# Patient Record
Sex: FEMALE | Race: WHITE | NOT HISPANIC OR LATINO | Employment: UNEMPLOYED
[De-identification: names, ages, dates, MRNs, and addresses within clinical notes are randomized per-mention and may not be internally consistent; named-entity substitution may affect disease eponyms.]

---

## 2017-05-27 ENCOUNTER — HEALTH MAINTENANCE LETTER (OUTPATIENT)
Age: 47
End: 2017-05-27

## 2017-06-15 ENCOUNTER — OFFICE VISIT - HEALTHEAST (OUTPATIENT)
Dept: ADDICTION MEDICINE | Facility: CLINIC | Age: 47
End: 2017-06-15

## 2017-06-15 DIAGNOSIS — F15.20 AMPHETAMINE USE DISORDER, SEVERE (H): ICD-10-CM

## 2017-06-26 ENCOUNTER — COMMUNICATION - HEALTHEAST (OUTPATIENT)
Dept: ADDICTION MEDICINE | Facility: CLINIC | Age: 47
End: 2017-06-26

## 2017-07-24 ENCOUNTER — OFFICE VISIT - HEALTHEAST (OUTPATIENT)
Dept: ADDICTION MEDICINE | Facility: CLINIC | Age: 47
End: 2017-07-24

## 2017-07-24 DIAGNOSIS — F15.20 AMPHETAMINE USE DISORDER, SEVERE (H): ICD-10-CM

## 2017-07-25 ENCOUNTER — OFFICE VISIT - HEALTHEAST (OUTPATIENT)
Dept: ADDICTION MEDICINE | Facility: CLINIC | Age: 47
End: 2017-07-25

## 2017-07-25 DIAGNOSIS — F15.20 AMPHETAMINE USE DISORDER, SEVERE (H): ICD-10-CM

## 2017-07-28 ENCOUNTER — OFFICE VISIT - HEALTHEAST (OUTPATIENT)
Dept: ADDICTION MEDICINE | Facility: CLINIC | Age: 47
End: 2017-07-28

## 2017-07-28 DIAGNOSIS — F15.20 AMPHETAMINE USE DISORDER, SEVERE (H): ICD-10-CM

## 2017-07-30 ENCOUNTER — AMBULATORY - HEALTHEAST (OUTPATIENT)
Dept: ADDICTION MEDICINE | Facility: CLINIC | Age: 47
End: 2017-07-30

## 2017-08-02 ENCOUNTER — OFFICE VISIT - HEALTHEAST (OUTPATIENT)
Dept: ADDICTION MEDICINE | Facility: CLINIC | Age: 47
End: 2017-08-02

## 2017-08-02 ENCOUNTER — AMBULATORY - HEALTHEAST (OUTPATIENT)
Dept: ADDICTION MEDICINE | Facility: CLINIC | Age: 47
End: 2017-08-02

## 2017-08-02 ENCOUNTER — COMMUNICATION - HEALTHEAST (OUTPATIENT)
Dept: BEHAVIORAL HEALTH | Facility: CLINIC | Age: 47
End: 2017-08-02

## 2017-08-02 DIAGNOSIS — F15.20 AMPHETAMINE USE DISORDER, SEVERE (H): ICD-10-CM

## 2017-08-06 ENCOUNTER — AMBULATORY - HEALTHEAST (OUTPATIENT)
Dept: ADDICTION MEDICINE | Facility: CLINIC | Age: 47
End: 2017-08-06

## 2017-08-07 ENCOUNTER — OFFICE VISIT - HEALTHEAST (OUTPATIENT)
Dept: ADDICTION MEDICINE | Facility: CLINIC | Age: 47
End: 2017-08-07

## 2017-08-07 ENCOUNTER — COMMUNICATION - HEALTHEAST (OUTPATIENT)
Dept: ADDICTION MEDICINE | Facility: CLINIC | Age: 47
End: 2017-08-07

## 2017-08-07 DIAGNOSIS — F15.20 AMPHETAMINE USE DISORDER, SEVERE (H): ICD-10-CM

## 2017-08-08 ENCOUNTER — OFFICE VISIT - HEALTHEAST (OUTPATIENT)
Dept: ADDICTION MEDICINE | Facility: CLINIC | Age: 47
End: 2017-08-08

## 2017-08-08 DIAGNOSIS — F15.20 AMPHETAMINE USE DISORDER, SEVERE (H): ICD-10-CM

## 2017-08-10 ENCOUNTER — OFFICE VISIT - HEALTHEAST (OUTPATIENT)
Dept: BEHAVIORAL HEALTH | Facility: CLINIC | Age: 47
End: 2017-08-10

## 2017-08-10 DIAGNOSIS — F41.1 ANXIETY STATE: ICD-10-CM

## 2017-08-10 DIAGNOSIS — F39 UNSPECIFIED MOOD (AFFECTIVE) DISORDER (H): ICD-10-CM

## 2017-08-10 DIAGNOSIS — F15.21 METHAMPHETAMINE USE DISORDER, SEVERE, IN EARLY REMISSION (H): ICD-10-CM

## 2017-08-13 ENCOUNTER — AMBULATORY - HEALTHEAST (OUTPATIENT)
Dept: ADDICTION MEDICINE | Facility: CLINIC | Age: 47
End: 2017-08-13

## 2017-08-14 ENCOUNTER — OFFICE VISIT - HEALTHEAST (OUTPATIENT)
Dept: BEHAVIORAL HEALTH | Facility: CLINIC | Age: 47
End: 2017-08-14

## 2017-08-14 ENCOUNTER — OFFICE VISIT - HEALTHEAST (OUTPATIENT)
Dept: ADDICTION MEDICINE | Facility: CLINIC | Age: 47
End: 2017-08-14

## 2017-08-14 DIAGNOSIS — F31.9 BIPOLAR DISORDER, UNSPECIFIED (H): ICD-10-CM

## 2017-08-14 DIAGNOSIS — F15.20 AMPHETAMINE USE DISORDER, SEVERE (H): ICD-10-CM

## 2017-08-14 DIAGNOSIS — F15.20 METHAMPHETAMINE USE DISORDER, SEVERE (H): ICD-10-CM

## 2017-08-18 ENCOUNTER — OFFICE VISIT - HEALTHEAST (OUTPATIENT)
Dept: ADDICTION MEDICINE | Facility: CLINIC | Age: 47
End: 2017-08-18

## 2017-08-18 DIAGNOSIS — F15.20 AMPHETAMINE USE DISORDER, SEVERE (H): ICD-10-CM

## 2017-08-20 ENCOUNTER — AMBULATORY - HEALTHEAST (OUTPATIENT)
Dept: ADDICTION MEDICINE | Facility: CLINIC | Age: 47
End: 2017-08-20

## 2017-08-23 ENCOUNTER — OFFICE VISIT - HEALTHEAST (OUTPATIENT)
Dept: ADDICTION MEDICINE | Facility: CLINIC | Age: 47
End: 2017-08-23

## 2017-08-23 ENCOUNTER — OFFICE VISIT - HEALTHEAST (OUTPATIENT)
Dept: BEHAVIORAL HEALTH | Facility: CLINIC | Age: 47
End: 2017-08-23

## 2017-08-23 DIAGNOSIS — F39 EPISODIC MOOD DISORDER (H): ICD-10-CM

## 2017-08-23 DIAGNOSIS — F15.20 METHAMPHETAMINE ADDICTION (H): ICD-10-CM

## 2017-08-23 DIAGNOSIS — F15.20 AMPHETAMINE USE DISORDER, SEVERE (H): ICD-10-CM

## 2017-08-23 ASSESSMENT — MIFFLIN-ST. JEOR: SCORE: 1505.13

## 2017-08-25 ENCOUNTER — OFFICE VISIT - HEALTHEAST (OUTPATIENT)
Dept: ADDICTION MEDICINE | Facility: CLINIC | Age: 47
End: 2017-08-25

## 2017-08-25 ENCOUNTER — AMBULATORY - HEALTHEAST (OUTPATIENT)
Dept: ADDICTION MEDICINE | Facility: CLINIC | Age: 47
End: 2017-08-25

## 2017-08-25 DIAGNOSIS — F15.20 AMPHETAMINE USE DISORDER, SEVERE (H): ICD-10-CM

## 2017-08-29 ENCOUNTER — COMMUNICATION - HEALTHEAST (OUTPATIENT)
Dept: ADDICTION MEDICINE | Facility: CLINIC | Age: 47
End: 2017-08-29

## 2017-09-01 ENCOUNTER — AMBULATORY - HEALTHEAST (OUTPATIENT)
Dept: ADDICTION MEDICINE | Facility: CLINIC | Age: 47
End: 2017-09-01

## 2017-09-01 ENCOUNTER — COMMUNICATION - HEALTHEAST (OUTPATIENT)
Dept: ADDICTION MEDICINE | Facility: CLINIC | Age: 47
End: 2017-09-01

## 2017-09-05 ENCOUNTER — COMMUNICATION - HEALTHEAST (OUTPATIENT)
Dept: ADDICTION MEDICINE | Facility: HOSPITAL | Age: 47
End: 2017-09-05

## 2017-09-11 ENCOUNTER — AMBULATORY - HEALTHEAST (OUTPATIENT)
Dept: ADDICTION MEDICINE | Facility: HOSPITAL | Age: 47
End: 2017-09-11

## 2017-09-12 ENCOUNTER — AMBULATORY - HEALTHEAST (OUTPATIENT)
Dept: ADDICTION MEDICINE | Facility: HOSPITAL | Age: 47
End: 2017-09-12

## 2017-09-13 ENCOUNTER — COMMUNICATION - HEALTHEAST (OUTPATIENT)
Dept: ADDICTION MEDICINE | Facility: HOSPITAL | Age: 47
End: 2017-09-13

## 2017-09-14 ENCOUNTER — COMMUNICATION - HEALTHEAST (OUTPATIENT)
Dept: ADDICTION MEDICINE | Facility: CLINIC | Age: 47
End: 2017-09-14

## 2017-09-14 ENCOUNTER — AMBULATORY - HEALTHEAST (OUTPATIENT)
Dept: ADDICTION MEDICINE | Facility: CLINIC | Age: 47
End: 2017-09-14

## 2017-11-22 ENCOUNTER — COMMUNICATION - HEALTHEAST (OUTPATIENT)
Dept: BEHAVIORAL HEALTH | Facility: CLINIC | Age: 47
End: 2017-11-22

## 2018-01-15 ENCOUNTER — COMMUNICATION - HEALTHEAST (OUTPATIENT)
Dept: BEHAVIORAL HEALTH | Facility: CLINIC | Age: 48
End: 2018-01-15

## 2018-04-23 ENCOUNTER — MEDICAL CORRESPONDENCE (OUTPATIENT)
Dept: HEALTH INFORMATION MANAGEMENT | Facility: CLINIC | Age: 48
End: 2018-04-23

## 2018-10-02 LAB
CHOLEST SERPL-MCNC: 230 MG/DL (ref 0–199)
HDLC SERPL-MCNC: 63 MG/DL (ref 40–59)
LDLC SERPL CALC-MCNC: 145 MG/DL
PAP-ABSTRACT: NORMAL
TRIGL SERPL-MCNC: 108 MG/DL

## 2018-10-11 ENCOUNTER — TRANSFERRED RECORDS (OUTPATIENT)
Dept: HEALTH INFORMATION MANAGEMENT | Facility: CLINIC | Age: 48
End: 2018-10-11

## 2019-11-12 ENCOUNTER — COMMUNICATION - HEALTHEAST (OUTPATIENT)
Dept: ADDICTION MEDICINE | Facility: HOSPITAL | Age: 49
End: 2019-11-12

## 2019-11-12 ENCOUNTER — OFFICE VISIT - HEALTHEAST (OUTPATIENT)
Dept: ADDICTION MEDICINE | Facility: HOSPITAL | Age: 49
End: 2019-11-12

## 2019-11-12 DIAGNOSIS — F15.20 METHAMPHETAMINE USE DISORDER, SEVERE (H): ICD-10-CM

## 2019-11-25 ENCOUNTER — OFFICE VISIT - HEALTHEAST (OUTPATIENT)
Dept: ADDICTION MEDICINE | Facility: HOSPITAL | Age: 49
End: 2019-11-25

## 2019-11-25 ENCOUNTER — AMBULATORY - HEALTHEAST (OUTPATIENT)
Dept: ADDICTION MEDICINE | Facility: HOSPITAL | Age: 49
End: 2019-11-25

## 2019-11-25 DIAGNOSIS — F15.11 OTHER STIMULANT ABUSE, IN REMISSION (H): ICD-10-CM

## 2019-11-25 DIAGNOSIS — F15.20 METHAMPHETAMINE USE DISORDER, SEVERE (H): ICD-10-CM

## 2019-11-27 ENCOUNTER — TRANSFERRED RECORDS (OUTPATIENT)
Dept: HEALTH INFORMATION MANAGEMENT | Facility: CLINIC | Age: 49
End: 2019-11-27

## 2019-11-27 ENCOUNTER — OFFICE VISIT - HEALTHEAST (OUTPATIENT)
Dept: ADDICTION MEDICINE | Facility: HOSPITAL | Age: 49
End: 2019-11-27

## 2019-11-27 ENCOUNTER — AMBULATORY - HEALTHEAST (OUTPATIENT)
Dept: ADDICTION MEDICINE | Facility: HOSPITAL | Age: 49
End: 2019-11-27

## 2019-11-27 DIAGNOSIS — F15.20 METHAMPHETAMINE USE DISORDER, SEVERE (H): ICD-10-CM

## 2019-12-02 ENCOUNTER — OFFICE VISIT - HEALTHEAST (OUTPATIENT)
Dept: ADDICTION MEDICINE | Facility: HOSPITAL | Age: 49
End: 2019-12-02

## 2019-12-02 DIAGNOSIS — F15.20 METHAMPHETAMINE USE DISORDER, SEVERE (H): ICD-10-CM

## 2019-12-04 ENCOUNTER — OFFICE VISIT - HEALTHEAST (OUTPATIENT)
Dept: ADDICTION MEDICINE | Facility: HOSPITAL | Age: 49
End: 2019-12-04

## 2019-12-04 DIAGNOSIS — F15.20 METHAMPHETAMINE USE DISORDER, SEVERE (H): ICD-10-CM

## 2019-12-06 ENCOUNTER — AMBULATORY - HEALTHEAST (OUTPATIENT)
Dept: ADDICTION MEDICINE | Facility: HOSPITAL | Age: 49
End: 2019-12-06

## 2019-12-09 ENCOUNTER — OFFICE VISIT - HEALTHEAST (OUTPATIENT)
Dept: ADDICTION MEDICINE | Facility: HOSPITAL | Age: 49
End: 2019-12-09

## 2019-12-09 DIAGNOSIS — F15.20 METHAMPHETAMINE USE DISORDER, SEVERE (H): ICD-10-CM

## 2019-12-11 ENCOUNTER — OFFICE VISIT - HEALTHEAST (OUTPATIENT)
Dept: ADDICTION MEDICINE | Facility: HOSPITAL | Age: 49
End: 2019-12-11

## 2019-12-11 ENCOUNTER — AMBULATORY - HEALTHEAST (OUTPATIENT)
Dept: LAB | Facility: HOSPITAL | Age: 49
End: 2019-12-11

## 2019-12-11 DIAGNOSIS — F15.20 METHAMPHETAMINE USE DISORDER, SEVERE (H): ICD-10-CM

## 2019-12-11 DIAGNOSIS — F15.11 OTHER STIMULANT ABUSE, IN REMISSION (H): ICD-10-CM

## 2019-12-11 LAB
AMPHETAMINES UR QL SCN: ABNORMAL
BARBITURATES UR QL: ABNORMAL
BENZODIAZ UR QL: ABNORMAL
CANNABINOIDS UR QL SCN: ABNORMAL
COCAINE UR QL: ABNORMAL
CREAT UR-MCNC: 21.3 MG/DL
METHADONE UR QL SCN: ABNORMAL
OPIATES UR QL SCN: ABNORMAL
OXYCODONE UR QL: ABNORMAL
PCP UR QL SCN: ABNORMAL

## 2019-12-13 ENCOUNTER — AMBULATORY - HEALTHEAST (OUTPATIENT)
Dept: ADDICTION MEDICINE | Facility: HOSPITAL | Age: 49
End: 2019-12-13

## 2019-12-13 ENCOUNTER — OFFICE VISIT - HEALTHEAST (OUTPATIENT)
Dept: ADDICTION MEDICINE | Facility: HOSPITAL | Age: 49
End: 2019-12-13

## 2019-12-13 ENCOUNTER — AMBULATORY - HEALTHEAST (OUTPATIENT)
Dept: LAB | Facility: HOSPITAL | Age: 49
End: 2019-12-13

## 2019-12-13 DIAGNOSIS — F15.11 OTHER STIMULANT ABUSE, IN REMISSION (H): ICD-10-CM

## 2019-12-13 DIAGNOSIS — F15.20 METHAMPHETAMINE USE DISORDER, SEVERE (H): ICD-10-CM

## 2019-12-13 LAB
AMPHET UR-MCNC: 655 NG/ML
AMPHETAMINES UR QL SCN: ABNORMAL
BARBITURATES UR QL: ABNORMAL
BENZODIAZ UR QL: ABNORMAL
CANNABINOIDS UR QL SCN: ABNORMAL
COCAINE UR QL: ABNORMAL
CREAT UR-MCNC: 369.6 MG/DL
MDA UR-MCNC: <200 NG/ML
MDEA UR-MCNC: <200 NG/ML
MDMA UR-MCNC: <200 NG/ML
METHADONE UR QL SCN: ABNORMAL
METHAMPHET UR-MCNC: 3743 NG/ML
OPIATES UR QL SCN: ABNORMAL
OXYCODONE UR QL: ABNORMAL
PCP UR QL SCN: ABNORMAL
PHENTERMINE UR CFM-MCNC: <200 NG/ML

## 2019-12-14 LAB — BZE UR-MCNC: >1000 NG/ML

## 2019-12-16 ENCOUNTER — OFFICE VISIT - HEALTHEAST (OUTPATIENT)
Dept: ADDICTION MEDICINE | Facility: HOSPITAL | Age: 49
End: 2019-12-16

## 2019-12-16 DIAGNOSIS — F15.20 METHAMPHETAMINE USE DISORDER, SEVERE (H): ICD-10-CM

## 2019-12-16 LAB
AMPHET UR-MCNC: 3485 NG/ML
BZE UR-MCNC: >1000 NG/ML
MDA UR-MCNC: <200 NG/ML
MDEA UR-MCNC: <200 NG/ML
MDMA UR-MCNC: <200 NG/ML
METHAMPHET UR-MCNC: 5336 NG/ML
PHENTERMINE UR CFM-MCNC: <200 NG/ML

## 2019-12-18 ENCOUNTER — COMMUNICATION - HEALTHEAST (OUTPATIENT)
Dept: ADDICTION MEDICINE | Facility: HOSPITAL | Age: 49
End: 2019-12-18

## 2019-12-20 ENCOUNTER — AMBULATORY - HEALTHEAST (OUTPATIENT)
Dept: LAB | Facility: HOSPITAL | Age: 49
End: 2019-12-20

## 2019-12-20 ENCOUNTER — AMBULATORY - HEALTHEAST (OUTPATIENT)
Dept: ADDICTION MEDICINE | Facility: HOSPITAL | Age: 49
End: 2019-12-20

## 2019-12-20 ENCOUNTER — OFFICE VISIT - HEALTHEAST (OUTPATIENT)
Dept: ADDICTION MEDICINE | Facility: HOSPITAL | Age: 49
End: 2019-12-20

## 2019-12-20 DIAGNOSIS — F15.11 OTHER STIMULANT ABUSE, IN REMISSION (H): ICD-10-CM

## 2019-12-20 DIAGNOSIS — F15.20 METHAMPHETAMINE USE DISORDER, SEVERE (H): ICD-10-CM

## 2019-12-20 LAB
AMPHETAMINES UR QL SCN: NORMAL
BARBITURATES UR QL: NORMAL
BENZODIAZ UR QL: NORMAL
CANNABINOIDS UR QL SCN: NORMAL
COCAINE UR QL: NORMAL
CREAT UR-MCNC: 196.3 MG/DL
METHADONE UR QL SCN: NORMAL
OPIATES UR QL SCN: NORMAL
OXYCODONE UR QL: NORMAL
PCP UR QL SCN: NORMAL

## 2019-12-23 ENCOUNTER — OFFICE VISIT - HEALTHEAST (OUTPATIENT)
Dept: ADDICTION MEDICINE | Facility: HOSPITAL | Age: 49
End: 2019-12-23

## 2019-12-23 ENCOUNTER — AMBULATORY - HEALTHEAST (OUTPATIENT)
Dept: LAB | Facility: HOSPITAL | Age: 49
End: 2019-12-23

## 2019-12-23 DIAGNOSIS — F15.20 METHAMPHETAMINE USE DISORDER, SEVERE (H): ICD-10-CM

## 2019-12-23 DIAGNOSIS — F15.11 OTHER STIMULANT ABUSE, IN REMISSION (H): ICD-10-CM

## 2019-12-23 LAB
AMPHETAMINES UR QL SCN: NORMAL
BARBITURATES UR QL: NORMAL
BENZODIAZ UR QL: NORMAL
CANNABINOIDS UR QL SCN: NORMAL
COCAINE UR QL: NORMAL
CREAT UR-MCNC: 25.1 MG/DL
METHADONE UR QL SCN: NORMAL
OPIATES UR QL SCN: NORMAL
OXYCODONE UR QL: NORMAL
PCP UR QL SCN: NORMAL

## 2019-12-26 ENCOUNTER — AMBULATORY - HEALTHEAST (OUTPATIENT)
Dept: ADDICTION MEDICINE | Facility: HOSPITAL | Age: 49
End: 2019-12-26

## 2019-12-27 ENCOUNTER — OFFICE VISIT - HEALTHEAST (OUTPATIENT)
Dept: ADDICTION MEDICINE | Facility: HOSPITAL | Age: 49
End: 2019-12-27

## 2019-12-27 DIAGNOSIS — F15.20 METHAMPHETAMINE USE DISORDER, SEVERE (H): ICD-10-CM

## 2019-12-30 ENCOUNTER — OFFICE VISIT - HEALTHEAST (OUTPATIENT)
Dept: ADDICTION MEDICINE | Facility: HOSPITAL | Age: 49
End: 2019-12-30

## 2019-12-30 ENCOUNTER — AMBULATORY - HEALTHEAST (OUTPATIENT)
Dept: LAB | Facility: HOSPITAL | Age: 49
End: 2019-12-30

## 2019-12-30 DIAGNOSIS — F15.20 METHAMPHETAMINE USE DISORDER, SEVERE (H): ICD-10-CM

## 2019-12-30 DIAGNOSIS — F15.11 OTHER STIMULANT ABUSE, IN REMISSION (H): ICD-10-CM

## 2019-12-30 LAB
AMPHETAMINES UR QL SCN: NORMAL
BARBITURATES UR QL: NORMAL
BENZODIAZ UR QL: NORMAL
CANNABINOIDS UR QL SCN: NORMAL
COCAINE UR QL: NORMAL
CREAT UR-MCNC: 19.5 MG/DL
METHADONE UR QL SCN: NORMAL
OPIATES UR QL SCN: NORMAL
OXYCODONE UR QL: NORMAL
PCP UR QL SCN: NORMAL

## 2020-01-03 ENCOUNTER — OFFICE VISIT - HEALTHEAST (OUTPATIENT)
Dept: ADDICTION MEDICINE | Facility: HOSPITAL | Age: 50
End: 2020-01-03

## 2020-01-03 ENCOUNTER — AMBULATORY - HEALTHEAST (OUTPATIENT)
Dept: LAB | Facility: HOSPITAL | Age: 50
End: 2020-01-03

## 2020-01-03 ENCOUNTER — AMBULATORY - HEALTHEAST (OUTPATIENT)
Dept: ADDICTION MEDICINE | Facility: HOSPITAL | Age: 50
End: 2020-01-03

## 2020-01-03 DIAGNOSIS — F15.20 METHAMPHETAMINE USE DISORDER, SEVERE (H): ICD-10-CM

## 2020-01-03 DIAGNOSIS — F15.11 OTHER STIMULANT ABUSE, IN REMISSION (H): ICD-10-CM

## 2020-01-03 LAB
AMPHETAMINES UR QL SCN: NORMAL
BARBITURATES UR QL: NORMAL
BENZODIAZ UR QL: NORMAL
CANNABINOIDS UR QL SCN: NORMAL
COCAINE UR QL: NORMAL
CREAT UR-MCNC: 39.4 MG/DL
METHADONE UR QL SCN: NORMAL
OPIATES UR QL SCN: NORMAL
OXYCODONE UR QL: NORMAL
PCP UR QL SCN: NORMAL

## 2020-01-06 ENCOUNTER — OFFICE VISIT - HEALTHEAST (OUTPATIENT)
Dept: ADDICTION MEDICINE | Facility: HOSPITAL | Age: 50
End: 2020-01-06

## 2020-01-06 DIAGNOSIS — F15.20 METHAMPHETAMINE USE DISORDER, SEVERE (H): ICD-10-CM

## 2020-01-08 ENCOUNTER — AMBULATORY - HEALTHEAST (OUTPATIENT)
Dept: LAB | Facility: HOSPITAL | Age: 50
End: 2020-01-08

## 2020-01-08 ENCOUNTER — OFFICE VISIT - HEALTHEAST (OUTPATIENT)
Dept: ADDICTION MEDICINE | Facility: HOSPITAL | Age: 50
End: 2020-01-08

## 2020-01-08 DIAGNOSIS — F15.20 METHAMPHETAMINE USE DISORDER, SEVERE (H): ICD-10-CM

## 2020-01-08 DIAGNOSIS — F15.11 OTHER STIMULANT ABUSE, IN REMISSION (H): ICD-10-CM

## 2020-01-08 LAB
AMPHETAMINES UR QL SCN: NORMAL
BARBITURATES UR QL: NORMAL
BENZODIAZ UR QL: NORMAL
CANNABINOIDS UR QL SCN: NORMAL
COCAINE UR QL: NORMAL
CREAT UR-MCNC: 18.6 MG/DL
METHADONE UR QL SCN: NORMAL
OPIATES UR QL SCN: NORMAL
OXYCODONE UR QL: NORMAL
PCP UR QL SCN: NORMAL

## 2020-01-10 ENCOUNTER — AMBULATORY - HEALTHEAST (OUTPATIENT)
Dept: ADDICTION MEDICINE | Facility: HOSPITAL | Age: 50
End: 2020-01-10

## 2020-01-10 ENCOUNTER — OFFICE VISIT - HEALTHEAST (OUTPATIENT)
Dept: ADDICTION MEDICINE | Facility: HOSPITAL | Age: 50
End: 2020-01-10

## 2020-01-10 DIAGNOSIS — F15.20 METHAMPHETAMINE USE DISORDER, SEVERE (H): ICD-10-CM

## 2020-01-13 ENCOUNTER — OFFICE VISIT - HEALTHEAST (OUTPATIENT)
Dept: ADDICTION MEDICINE | Facility: HOSPITAL | Age: 50
End: 2020-01-13

## 2020-01-13 DIAGNOSIS — F15.20 METHAMPHETAMINE USE DISORDER, SEVERE (H): ICD-10-CM

## 2020-01-15 ENCOUNTER — OFFICE VISIT - HEALTHEAST (OUTPATIENT)
Dept: ADDICTION MEDICINE | Facility: HOSPITAL | Age: 50
End: 2020-01-15

## 2020-01-15 DIAGNOSIS — F15.20 METHAMPHETAMINE USE DISORDER, SEVERE (H): ICD-10-CM

## 2020-01-17 ENCOUNTER — OFFICE VISIT - HEALTHEAST (OUTPATIENT)
Dept: ADDICTION MEDICINE | Facility: HOSPITAL | Age: 50
End: 2020-01-17

## 2020-01-17 ENCOUNTER — AMBULATORY - HEALTHEAST (OUTPATIENT)
Dept: ADDICTION MEDICINE | Facility: HOSPITAL | Age: 50
End: 2020-01-17

## 2020-01-17 DIAGNOSIS — F15.20 METHAMPHETAMINE USE DISORDER, SEVERE (H): ICD-10-CM

## 2020-01-20 ENCOUNTER — AMBULATORY - HEALTHEAST (OUTPATIENT)
Dept: ADDICTION MEDICINE | Facility: HOSPITAL | Age: 50
End: 2020-01-20

## 2020-01-20 ENCOUNTER — OFFICE VISIT - HEALTHEAST (OUTPATIENT)
Dept: ADDICTION MEDICINE | Facility: HOSPITAL | Age: 50
End: 2020-01-20

## 2020-01-20 ENCOUNTER — AMBULATORY - HEALTHEAST (OUTPATIENT)
Dept: LAB | Facility: HOSPITAL | Age: 50
End: 2020-01-20

## 2020-01-20 ENCOUNTER — TRANSFERRED RECORDS (OUTPATIENT)
Dept: HEALTH INFORMATION MANAGEMENT | Facility: CLINIC | Age: 50
End: 2020-01-20

## 2020-01-20 DIAGNOSIS — F15.20 METHAMPHETAMINE USE DISORDER, SEVERE (H): ICD-10-CM

## 2020-01-20 DIAGNOSIS — F15.11 OTHER STIMULANT ABUSE, IN REMISSION (H): ICD-10-CM

## 2020-01-20 LAB
AMPHETAMINES UR QL SCN: NORMAL
BARBITURATES UR QL: NORMAL
BENZODIAZ UR QL: NORMAL
CANNABINOIDS UR QL SCN: NORMAL
COCAINE UR QL: NORMAL
CREAT UR-MCNC: 20.1 MG/DL
METHADONE UR QL SCN: NORMAL
OPIATES UR QL SCN: NORMAL
OXYCODONE UR QL: NORMAL
PCP UR QL SCN: NORMAL

## 2020-01-22 ENCOUNTER — OFFICE VISIT - HEALTHEAST (OUTPATIENT)
Dept: ADDICTION MEDICINE | Facility: HOSPITAL | Age: 50
End: 2020-01-22

## 2020-01-22 DIAGNOSIS — F15.20 METHAMPHETAMINE USE DISORDER, SEVERE (H): ICD-10-CM

## 2020-01-24 ENCOUNTER — AMBULATORY - HEALTHEAST (OUTPATIENT)
Dept: ADDICTION MEDICINE | Facility: HOSPITAL | Age: 50
End: 2020-01-24

## 2020-01-24 ENCOUNTER — OFFICE VISIT - HEALTHEAST (OUTPATIENT)
Dept: ADDICTION MEDICINE | Facility: HOSPITAL | Age: 50
End: 2020-01-24

## 2020-01-24 ENCOUNTER — AMBULATORY - HEALTHEAST (OUTPATIENT)
Dept: LAB | Facility: HOSPITAL | Age: 50
End: 2020-01-24

## 2020-01-24 DIAGNOSIS — F15.20 METHAMPHETAMINE USE DISORDER, SEVERE (H): ICD-10-CM

## 2020-01-24 DIAGNOSIS — F15.11 OTHER STIMULANT ABUSE, IN REMISSION (H): ICD-10-CM

## 2020-01-24 LAB
AMPHETAMINES UR QL SCN: NORMAL
BARBITURATES UR QL: NORMAL
BENZODIAZ UR QL: NORMAL
CANNABINOIDS UR QL SCN: NORMAL
COCAINE UR QL: NORMAL
CREAT UR-MCNC: 87.5 MG/DL
METHADONE UR QL SCN: NORMAL
OPIATES UR QL SCN: NORMAL
OXYCODONE UR QL: NORMAL
PCP UR QL SCN: NORMAL

## 2020-01-27 ENCOUNTER — AMBULATORY - HEALTHEAST (OUTPATIENT)
Dept: ADDICTION MEDICINE | Facility: HOSPITAL | Age: 50
End: 2020-01-27

## 2020-01-27 ENCOUNTER — OFFICE VISIT - HEALTHEAST (OUTPATIENT)
Dept: ADDICTION MEDICINE | Facility: HOSPITAL | Age: 50
End: 2020-01-27

## 2020-01-27 DIAGNOSIS — F15.20 METHAMPHETAMINE USE DISORDER, SEVERE (H): ICD-10-CM

## 2020-01-31 ENCOUNTER — OFFICE VISIT - HEALTHEAST (OUTPATIENT)
Dept: ADDICTION MEDICINE | Facility: HOSPITAL | Age: 50
End: 2020-01-31

## 2020-01-31 ENCOUNTER — AMBULATORY - HEALTHEAST (OUTPATIENT)
Dept: ADDICTION MEDICINE | Facility: HOSPITAL | Age: 50
End: 2020-01-31

## 2020-01-31 DIAGNOSIS — F15.20 METHAMPHETAMINE USE DISORDER, SEVERE (H): ICD-10-CM

## 2020-02-03 ENCOUNTER — OFFICE VISIT - HEALTHEAST (OUTPATIENT)
Dept: ADDICTION MEDICINE | Facility: HOSPITAL | Age: 50
End: 2020-02-03

## 2020-02-03 ENCOUNTER — AMBULATORY - HEALTHEAST (OUTPATIENT)
Dept: LAB | Facility: HOSPITAL | Age: 50
End: 2020-02-03

## 2020-02-03 DIAGNOSIS — F15.20 METHAMPHETAMINE USE DISORDER, SEVERE (H): ICD-10-CM

## 2020-02-03 DIAGNOSIS — F15.11 OTHER STIMULANT ABUSE, IN REMISSION (H): ICD-10-CM

## 2020-02-03 LAB
AMPHETAMINES UR QL SCN: NORMAL
BARBITURATES UR QL: NORMAL
BENZODIAZ UR QL: NORMAL
CANNABINOIDS UR QL SCN: NORMAL
COCAINE UR QL: NORMAL
CREAT UR-MCNC: 22.9 MG/DL
METHADONE UR QL SCN: NORMAL
OPIATES UR QL SCN: NORMAL
OXYCODONE UR QL: NORMAL
PCP UR QL SCN: NORMAL

## 2020-02-07 ENCOUNTER — OFFICE VISIT - HEALTHEAST (OUTPATIENT)
Dept: ADDICTION MEDICINE | Facility: HOSPITAL | Age: 50
End: 2020-02-07

## 2020-02-07 ENCOUNTER — AMBULATORY - HEALTHEAST (OUTPATIENT)
Dept: ADDICTION MEDICINE | Facility: HOSPITAL | Age: 50
End: 2020-02-07

## 2020-02-07 DIAGNOSIS — F15.20 METHAMPHETAMINE USE DISORDER, SEVERE (H): ICD-10-CM

## 2020-02-10 ENCOUNTER — AMBULATORY - HEALTHEAST (OUTPATIENT)
Dept: LAB | Facility: HOSPITAL | Age: 50
End: 2020-02-10

## 2020-02-10 ENCOUNTER — OFFICE VISIT - HEALTHEAST (OUTPATIENT)
Dept: ADDICTION MEDICINE | Facility: HOSPITAL | Age: 50
End: 2020-02-10

## 2020-02-10 DIAGNOSIS — F15.20 METHAMPHETAMINE USE DISORDER, SEVERE (H): ICD-10-CM

## 2020-02-10 DIAGNOSIS — F15.11 OTHER STIMULANT ABUSE, IN REMISSION (H): ICD-10-CM

## 2020-02-10 LAB
AMPHETAMINES UR QL SCN: NORMAL
BARBITURATES UR QL: NORMAL
BENZODIAZ UR QL: NORMAL
CANNABINOIDS UR QL SCN: NORMAL
COCAINE UR QL: NORMAL
CREAT UR-MCNC: 66.6 MG/DL
METHADONE UR QL SCN: NORMAL
OPIATES UR QL SCN: NORMAL
OXYCODONE UR QL: NORMAL
PCP UR QL SCN: NORMAL

## 2020-02-14 ENCOUNTER — OFFICE VISIT - HEALTHEAST (OUTPATIENT)
Dept: ADDICTION MEDICINE | Facility: HOSPITAL | Age: 50
End: 2020-02-14

## 2020-02-14 ENCOUNTER — AMBULATORY - HEALTHEAST (OUTPATIENT)
Dept: ADDICTION MEDICINE | Facility: HOSPITAL | Age: 50
End: 2020-02-14

## 2020-02-14 DIAGNOSIS — F15.20 METHAMPHETAMINE USE DISORDER, SEVERE (H): ICD-10-CM

## 2020-02-17 ENCOUNTER — OFFICE VISIT - HEALTHEAST (OUTPATIENT)
Dept: ADDICTION MEDICINE | Facility: HOSPITAL | Age: 50
End: 2020-02-17

## 2020-02-17 ENCOUNTER — AMBULATORY - HEALTHEAST (OUTPATIENT)
Dept: LAB | Facility: HOSPITAL | Age: 50
End: 2020-02-17

## 2020-02-17 DIAGNOSIS — F15.11 OTHER STIMULANT ABUSE, IN REMISSION (H): ICD-10-CM

## 2020-02-17 DIAGNOSIS — F15.20 METHAMPHETAMINE USE DISORDER, SEVERE (H): ICD-10-CM

## 2020-02-17 LAB
AMPHETAMINES UR QL SCN: NORMAL
BARBITURATES UR QL: NORMAL
BENZODIAZ UR QL: NORMAL
CANNABINOIDS UR QL SCN: NORMAL
COCAINE UR QL: NORMAL
CREAT UR-MCNC: 389.1 MG/DL
METHADONE UR QL SCN: NORMAL
OPIATES UR QL SCN: NORMAL
OXYCODONE UR QL: NORMAL
PCP UR QL SCN: NORMAL

## 2020-02-21 ENCOUNTER — AMBULATORY - HEALTHEAST (OUTPATIENT)
Dept: ADDICTION MEDICINE | Facility: HOSPITAL | Age: 50
End: 2020-02-21

## 2020-02-21 ENCOUNTER — OFFICE VISIT - HEALTHEAST (OUTPATIENT)
Dept: ADDICTION MEDICINE | Facility: HOSPITAL | Age: 50
End: 2020-02-21

## 2020-02-21 DIAGNOSIS — F15.20 METHAMPHETAMINE USE DISORDER, SEVERE (H): ICD-10-CM

## 2020-02-24 ENCOUNTER — AMBULATORY - HEALTHEAST (OUTPATIENT)
Dept: LAB | Facility: HOSPITAL | Age: 50
End: 2020-02-24

## 2020-02-24 ENCOUNTER — OFFICE VISIT - HEALTHEAST (OUTPATIENT)
Dept: ADDICTION MEDICINE | Facility: HOSPITAL | Age: 50
End: 2020-02-24

## 2020-02-24 DIAGNOSIS — F15.11 OTHER STIMULANT ABUSE, IN REMISSION (H): ICD-10-CM

## 2020-02-24 DIAGNOSIS — F15.20 METHAMPHETAMINE USE DISORDER, SEVERE (H): ICD-10-CM

## 2020-02-24 LAB
AMPHETAMINES UR QL SCN: NORMAL
BARBITURATES UR QL: NORMAL
BENZODIAZ UR QL: NORMAL
CANNABINOIDS UR QL SCN: NORMAL
COCAINE UR QL: NORMAL
CREAT UR-MCNC: 48.2 MG/DL
METHADONE UR QL SCN: NORMAL
OPIATES UR QL SCN: NORMAL
OXYCODONE UR QL: NORMAL
PCP UR QL SCN: NORMAL

## 2020-02-27 ENCOUNTER — TRANSFERRED RECORDS (OUTPATIENT)
Dept: HEALTH INFORMATION MANAGEMENT | Facility: CLINIC | Age: 50
End: 2020-02-27

## 2020-02-27 ENCOUNTER — AMBULATORY - HEALTHEAST (OUTPATIENT)
Dept: ADDICTION MEDICINE | Facility: HOSPITAL | Age: 50
End: 2020-02-27

## 2020-02-28 ENCOUNTER — OFFICE VISIT - HEALTHEAST (OUTPATIENT)
Dept: ADDICTION MEDICINE | Facility: HOSPITAL | Age: 50
End: 2020-02-28

## 2020-02-28 ENCOUNTER — AMBULATORY - HEALTHEAST (OUTPATIENT)
Dept: ADDICTION MEDICINE | Facility: HOSPITAL | Age: 50
End: 2020-02-28

## 2020-02-28 DIAGNOSIS — F15.20 METHAMPHETAMINE USE DISORDER, SEVERE (H): ICD-10-CM

## 2020-03-03 ENCOUNTER — OFFICE VISIT - HEALTHEAST (OUTPATIENT)
Dept: ADDICTION MEDICINE | Facility: HOSPITAL | Age: 50
End: 2020-03-03

## 2020-03-03 ENCOUNTER — AMBULATORY - HEALTHEAST (OUTPATIENT)
Dept: LAB | Facility: HOSPITAL | Age: 50
End: 2020-03-03

## 2020-03-03 DIAGNOSIS — F15.11 OTHER STIMULANT ABUSE, IN REMISSION (H): ICD-10-CM

## 2020-03-03 DIAGNOSIS — F15.20 METHAMPHETAMINE USE DISORDER, SEVERE (H): ICD-10-CM

## 2020-03-03 LAB
AMPHETAMINES UR QL SCN: NORMAL
BARBITURATES UR QL: NORMAL
BENZODIAZ UR QL: NORMAL
CANNABINOIDS UR QL SCN: NORMAL
COCAINE UR QL: NORMAL
CREAT UR-MCNC: 241.7 MG/DL
METHADONE UR QL SCN: NORMAL
OPIATES UR QL SCN: NORMAL
OXYCODONE UR QL: NORMAL
PCP UR QL SCN: NORMAL

## 2020-03-06 ENCOUNTER — AMBULATORY - HEALTHEAST (OUTPATIENT)
Dept: ADDICTION MEDICINE | Facility: HOSPITAL | Age: 50
End: 2020-03-06

## 2020-03-10 ENCOUNTER — OFFICE VISIT - HEALTHEAST (OUTPATIENT)
Dept: ADDICTION MEDICINE | Facility: HOSPITAL | Age: 50
End: 2020-03-10

## 2020-03-10 ENCOUNTER — AMBULATORY - HEALTHEAST (OUTPATIENT)
Dept: LAB | Facility: HOSPITAL | Age: 50
End: 2020-03-10

## 2020-03-10 DIAGNOSIS — F15.20 METHAMPHETAMINE USE DISORDER, SEVERE (H): ICD-10-CM

## 2020-03-10 DIAGNOSIS — F15.11 OTHER STIMULANT ABUSE, IN REMISSION (H): ICD-10-CM

## 2020-03-10 LAB
AMPHETAMINES UR QL SCN: NORMAL
BARBITURATES UR QL: NORMAL
BENZODIAZ UR QL: NORMAL
CANNABINOIDS UR QL SCN: NORMAL
COCAINE UR QL: NORMAL
CREAT UR-MCNC: 56.7 MG/DL
METHADONE UR QL SCN: NORMAL
OPIATES UR QL SCN: NORMAL
OXYCODONE UR QL: NORMAL
PCP UR QL SCN: NORMAL

## 2020-03-13 ENCOUNTER — AMBULATORY - HEALTHEAST (OUTPATIENT)
Dept: ADDICTION MEDICINE | Facility: HOSPITAL | Age: 50
End: 2020-03-13

## 2020-03-17 ENCOUNTER — AMBULATORY - HEALTHEAST (OUTPATIENT)
Dept: ADDICTION MEDICINE | Facility: HOSPITAL | Age: 50
End: 2020-03-17

## 2020-03-19 ENCOUNTER — OFFICE VISIT - HEALTHEAST (OUTPATIENT)
Dept: ADDICTION MEDICINE | Facility: HOSPITAL | Age: 50
End: 2020-03-19

## 2020-03-20 ENCOUNTER — AMBULATORY - HEALTHEAST (OUTPATIENT)
Dept: ADDICTION MEDICINE | Facility: HOSPITAL | Age: 50
End: 2020-03-20

## 2020-03-24 ENCOUNTER — OFFICE VISIT - HEALTHEAST (OUTPATIENT)
Dept: ADDICTION MEDICINE | Facility: HOSPITAL | Age: 50
End: 2020-03-24

## 2020-03-26 ENCOUNTER — AMBULATORY - HEALTHEAST (OUTPATIENT)
Dept: ADDICTION MEDICINE | Facility: HOSPITAL | Age: 50
End: 2020-03-26

## 2020-03-31 ENCOUNTER — COMMUNICATION - HEALTHEAST (OUTPATIENT)
Dept: ADDICTION MEDICINE | Facility: HOSPITAL | Age: 50
End: 2020-03-31

## 2020-03-31 ENCOUNTER — OFFICE VISIT - HEALTHEAST (OUTPATIENT)
Dept: ADDICTION MEDICINE | Facility: HOSPITAL | Age: 50
End: 2020-03-31

## 2020-03-31 DIAGNOSIS — F15.20 METHAMPHETAMINE USE DISORDER, SEVERE (H): ICD-10-CM

## 2020-04-03 ENCOUNTER — COMMUNICATION - HEALTHEAST (OUTPATIENT)
Dept: ADDICTION MEDICINE | Facility: HOSPITAL | Age: 50
End: 2020-04-03

## 2020-04-03 ENCOUNTER — AMBULATORY - HEALTHEAST (OUTPATIENT)
Dept: ADDICTION MEDICINE | Facility: HOSPITAL | Age: 50
End: 2020-04-03

## 2020-04-07 ENCOUNTER — OFFICE VISIT - HEALTHEAST (OUTPATIENT)
Dept: ADDICTION MEDICINE | Facility: HOSPITAL | Age: 50
End: 2020-04-07

## 2020-04-07 DIAGNOSIS — F15.20 METHAMPHETAMINE USE DISORDER, SEVERE (H): ICD-10-CM

## 2020-04-09 ENCOUNTER — OFFICE VISIT - HEALTHEAST (OUTPATIENT)
Dept: ADDICTION MEDICINE | Facility: HOSPITAL | Age: 50
End: 2020-04-09

## 2020-04-09 DIAGNOSIS — F15.20 METHAMPHETAMINE USE DISORDER, SEVERE (H): ICD-10-CM

## 2020-04-10 ENCOUNTER — AMBULATORY - HEALTHEAST (OUTPATIENT)
Dept: ADDICTION MEDICINE | Facility: HOSPITAL | Age: 50
End: 2020-04-10

## 2020-04-14 ENCOUNTER — OFFICE VISIT - HEALTHEAST (OUTPATIENT)
Dept: ADDICTION MEDICINE | Facility: HOSPITAL | Age: 50
End: 2020-04-14

## 2020-04-14 DIAGNOSIS — F15.20 METHAMPHETAMINE USE DISORDER, SEVERE (H): ICD-10-CM

## 2020-04-17 ENCOUNTER — AMBULATORY - HEALTHEAST (OUTPATIENT)
Dept: ADDICTION MEDICINE | Facility: HOSPITAL | Age: 50
End: 2020-04-17

## 2020-04-21 ENCOUNTER — OFFICE VISIT - HEALTHEAST (OUTPATIENT)
Dept: ADDICTION MEDICINE | Facility: HOSPITAL | Age: 50
End: 2020-04-21

## 2020-04-21 ENCOUNTER — AMBULATORY - HEALTHEAST (OUTPATIENT)
Dept: ADDICTION MEDICINE | Facility: HOSPITAL | Age: 50
End: 2020-04-21

## 2020-04-21 DIAGNOSIS — F15.20 METHAMPHETAMINE USE DISORDER, SEVERE (H): ICD-10-CM

## 2020-04-23 ENCOUNTER — OFFICE VISIT - HEALTHEAST (OUTPATIENT)
Dept: ADDICTION MEDICINE | Facility: HOSPITAL | Age: 50
End: 2020-04-23

## 2020-04-23 DIAGNOSIS — F15.20 METHAMPHETAMINE USE DISORDER, SEVERE (H): ICD-10-CM

## 2020-04-24 ENCOUNTER — AMBULATORY - HEALTHEAST (OUTPATIENT)
Dept: ADDICTION MEDICINE | Facility: HOSPITAL | Age: 50
End: 2020-04-24

## 2020-04-28 ENCOUNTER — AMBULATORY - HEALTHEAST (OUTPATIENT)
Dept: ADDICTION MEDICINE | Facility: HOSPITAL | Age: 50
End: 2020-04-28

## 2020-05-01 ENCOUNTER — AMBULATORY - HEALTHEAST (OUTPATIENT)
Dept: ADDICTION MEDICINE | Facility: HOSPITAL | Age: 50
End: 2020-05-01

## 2020-05-05 ENCOUNTER — AMBULATORY - HEALTHEAST (OUTPATIENT)
Dept: ADDICTION MEDICINE | Facility: HOSPITAL | Age: 50
End: 2020-05-05

## 2020-05-06 ENCOUNTER — OFFICE VISIT - HEALTHEAST (OUTPATIENT)
Dept: ADDICTION MEDICINE | Facility: HOSPITAL | Age: 50
End: 2020-05-06

## 2020-05-06 DIAGNOSIS — F15.20 METHAMPHETAMINE USE DISORDER, SEVERE (H): ICD-10-CM

## 2020-05-07 ENCOUNTER — OFFICE VISIT - HEALTHEAST (OUTPATIENT)
Dept: ADDICTION MEDICINE | Facility: HOSPITAL | Age: 50
End: 2020-05-07

## 2020-05-07 DIAGNOSIS — F15.20 METHAMPHETAMINE USE DISORDER, SEVERE (H): ICD-10-CM

## 2020-05-08 ENCOUNTER — AMBULATORY - HEALTHEAST (OUTPATIENT)
Dept: ADDICTION MEDICINE | Facility: HOSPITAL | Age: 50
End: 2020-05-08

## 2020-05-08 ENCOUNTER — AMBULATORY - HEALTHEAST (OUTPATIENT)
Dept: LAB | Facility: HOSPITAL | Age: 50
End: 2020-05-08

## 2020-05-08 DIAGNOSIS — F15.11 OTHER STIMULANT ABUSE, IN REMISSION (H): ICD-10-CM

## 2020-05-08 DIAGNOSIS — F15.20 METHAMPHETAMINE USE DISORDER, SEVERE (H): ICD-10-CM

## 2020-05-08 LAB
AMPHETAMINES UR QL SCN: NORMAL
BARBITURATES UR QL: NORMAL
BENZODIAZ UR QL: NORMAL
CANNABINOIDS UR QL SCN: NORMAL
COCAINE UR QL: NORMAL
CREAT UR-MCNC: 45.1 MG/DL
METHADONE UR QL SCN: NORMAL
OPIATES UR QL SCN: NORMAL
OXYCODONE UR QL: NORMAL
PCP UR QL SCN: NORMAL

## 2020-05-12 ENCOUNTER — OFFICE VISIT - HEALTHEAST (OUTPATIENT)
Dept: ADDICTION MEDICINE | Facility: HOSPITAL | Age: 50
End: 2020-05-12

## 2020-05-12 DIAGNOSIS — F15.20 METHAMPHETAMINE USE DISORDER, SEVERE (H): ICD-10-CM

## 2020-05-15 ENCOUNTER — AMBULATORY - HEALTHEAST (OUTPATIENT)
Dept: ADDICTION MEDICINE | Facility: HOSPITAL | Age: 50
End: 2020-05-15

## 2020-05-19 ENCOUNTER — OFFICE VISIT - HEALTHEAST (OUTPATIENT)
Dept: ADDICTION MEDICINE | Facility: HOSPITAL | Age: 50
End: 2020-05-19

## 2020-05-19 ENCOUNTER — VIRTUAL VISIT (OUTPATIENT)
Dept: FAMILY MEDICINE | Facility: CLINIC | Age: 50
End: 2020-05-19

## 2020-05-19 DIAGNOSIS — F31.9 BIPOLAR 1 DISORDER (H): ICD-10-CM

## 2020-05-19 DIAGNOSIS — J44.9 CHRONIC OBSTRUCTIVE PULMONARY DISEASE, UNSPECIFIED COPD TYPE (H): Primary | ICD-10-CM

## 2020-05-19 DIAGNOSIS — B37.0 THRUSH: ICD-10-CM

## 2020-05-19 DIAGNOSIS — F15.20 METHAMPHETAMINE USE DISORDER, SEVERE (H): ICD-10-CM

## 2020-05-19 PROCEDURE — 99214 OFFICE O/P EST MOD 30 MIN: CPT | Mod: 95 | Performed by: PHYSICIAN ASSISTANT

## 2020-05-19 RX ORDER — TIOTROPIUM BROMIDE 18 UG/1
18 CAPSULE ORAL; RESPIRATORY (INHALATION) DAILY
Qty: 30 CAPSULE | Refills: 1 | Status: SHIPPED | OUTPATIENT
Start: 2020-05-19

## 2020-05-19 RX ORDER — MULTIPLE VITAMINS W/ MINERALS TAB 9MG-400MCG
1 TAB ORAL DAILY
COMMUNITY

## 2020-05-19 RX ORDER — NYSTATIN 100000/ML
500000 SUSPENSION, ORAL (FINAL DOSE FORM) ORAL 4 TIMES DAILY
Qty: 400 ML | Refills: 0 | Status: SHIPPED | OUTPATIENT
Start: 2020-05-19 | End: 2020-05-29

## 2020-05-19 RX ORDER — ARIPIPRAZOLE 10 MG/1
10 TABLET ORAL DAILY
Qty: 30 TABLET | Refills: 1 | Status: SHIPPED | OUTPATIENT
Start: 2020-05-19

## 2020-05-19 NOTE — PROGRESS NOTES
"Jaylin Easley is a 50 year old female who is being evaluated via a billable video visit.      The patient has been notified of following:     \"This video visit will be conducted via a call between you and your physician/provider. We have found that certain health care needs can be provided without the need for an in-person physical exam.  This service lets us provide the care you need with a video conversation.  If a prescription is necessary we can send it directly to your pharmacy.  If lab work is needed we can place an order for that and you can then stop by our lab to have the test done at a later time.    Video visits are billed at different rates depending on your insurance coverage.  Please reach out to your insurance provider with any questions.    If during the course of the call the physician/provider feels a video visit is not appropriate, you will not be charged for this service.\"    Patient has given verbal consent for Video visit? Yes    How would you like to obtain your AVS? Mail a copy    Patient would like the video invitation sent by: Text to cell phone: [phone]    Will anyone else be joining your video visit? No  {If patient encounters technical issues they should call [phone] :[id number]}    Subjective     Jaylin Easley is a 50 year old female who presents today via video visit for the following health issues:    HPI  Possible thrush since 1 week now.  Refill on Abilify since she has been off it for a month now and has not been able to get it refilled.      Patient is new to [place]. Had previously been seen in the [facility] system.   Reviewed care everywhere.   Last pap smear normal 10/2/2018.   No recent mammogram or colonoscopy noted.           Video Start Time: 4:07 PM      Patient Active Problem List   Diagnosis     CARDIOVASCULAR SCREENING; LDL GOAL LESS THAN 130     Moderate major depression (H)     Bipolar 1 disorder (H)     Neck pain     Vitamin D insufficiency     Past " "Surgical History:   Procedure Laterality Date     ARTHROSCOPY KNEE  10/27/2011    Procedure:ARTHROSCOPY KNEE; Left knee arthroscopy with partial medial and lateral meniscectomies; Surgeon:SUREKHA WALDEN; Location:[facility]     CERVIX SURGERY       DILATION AND CURETTAGE SUCTION, TREAT INCOMPLETE       x3       Social History     Tobacco Use     Smoking status: Current Every Day Smoker     Packs/day: 1.00     Types: Cigarettes     Smokeless tobacco: Never Used   Substance Use Topics     Alcohol use: Yes     Alcohol/week: 0.0 standard drinks     Comment: rare     Family History   Problem Relation Age of Onset     Respiratory Father      Respiratory Brother      Glaucoma No family hx of      Macular Degeneration No family hx of            Reviewed and updated as needed this visit by Provider  Tobacco  Allergies  Meds  Problems  Med Hx  Surg Hx  Fam Hx         Review of Systems   Constitutional, HEENT, cardiovascular, pulmonary, gi and gu systems are negative, except as otherwise noted.      Objective    There were no vitals taken for this visit.  Estimated body mass index is 27.41 kg/m  as calculated from the following:    Height as of 3/4/15: 1.778 m (5' 10\").    Weight as of 3/4/15: 86.6 kg (191 lb).  Physical Exam     {video visit exam brief selected:[id number]::\"GENERAL: Healthy, alert and no distress\",\"EYES: Eyes grossly normal to inspection.  No discharge or erythema, or obvious scleral/conjunctival abnormalities.\",\"RESP: No audible wheeze, cough, or visible cyanosis.  No visible retractions or increased work of breathing.  \",\"SKIN: Visible skin clear. No significant rash, abnormal pigmentation or lesions.\",\"NEURO: Cranial nerves grossly intact.  Mentation and speech appropriate for age.\",\"PSYCH: Mentation appears normal, affect normal/bright, judgement and insight intact, normal speech and appearance well-groomed.\"}      Diagnostic Test Results:  none         {PROVIDER CHARTING " PREFERENCE:790648}      Video-Visit Details    Type of service:  Video Visit    Video End Time:{video visit start/end time for provider to select:110332}    Originating Location (pt. Location): Home    Distant Location (provider location):  [facility]     Platform used for Video Visit: Iggy    No follow-ups on file.       Alicia Garnett PA-C

## 2020-05-19 NOTE — PROGRESS NOTES
"Jaylin Easley is a 50 year old female who is being evaluated via a billable telephone visit.      The patient has been notified of following:     \"This telephone visit will be conducted via a call between you and your physician/provider. We have found that certain health care needs can be provided without the need for a physical exam.  This service lets us provide the care you need with a short phone conversation.  If a prescription is necessary we can send it directly to your pharmacy.  If lab work is needed we can place an order for that and you can then stop by our lab to have the test done at a later time.    Telephone visits are billed at different rates depending on your insurance coverage. During this emergency period, for some insurers they may be billed the same as an in-person visit.  Please reach out to your insurance provider with any questions.    If during the course of the call the physician/provider feels a telephone visit is not appropriate, you will not be charged for this service.\"    Patient has given verbal consent for Telephone visit?  Yes    What phone number would you like to be contacted at?     How would you like to obtain your AVS? Mail a copy    Subjective     Jaylin Easley is a 50 year old female who presents via phone visit today for the following health issues:    HPI    Subjective     Jaylin Easlye is a 50 year old female who presents today via video visit for the following health issues:    HPI  Possible thrush since 1 week now.  Refill on Abilify since she has been off it for a month now and has not been able to get it refilled.      Patient is new to [place]. Had previously been seen in the [facility] system.   Reviewed care everywhere.   Last pap smear normal 10/2/2018.   No recent mammogram or colonoscopy noted.     Attempted video visit but patient was unable to work camera on her end.   Completed as telephone visit.     Patient has a history of bipolar, COPD " and methamphetamine abuse.   She notes her prior PCP was in [place] but it is too far away now.   Has been out of medications for over 1 month.   She feels like she has been manic. No suicidal thoughts or plans.   She notes she uses the clonazepam for amina episodes. Has been on abilify for maintenance for years.   Has completed day treatment and other therapies in the past. Last was 1 year ago.   She notes no drug use in the last year, but is smoking 10 cigarettes per day.   Does not have a current psychiatrist or therapist.     Has had 1 week of cotton mouth. Has a history of thrush and this feels similar. 2 white sores on the tongue, minimally painful.     COPD has been well controlled without recent hospitalizations. Usually takes spiriva once per day.           Patient Active Problem List   Diagnosis     CARDIOVASCULAR SCREENING; LDL GOAL LESS THAN 130     Moderate major depression (H)     Bipolar 1 disorder (H)     Neck pain     Vitamin D insufficiency     Past Surgical History:   Procedure Laterality Date     ARTHROSCOPY KNEE  10/27/2011    Procedure:ARTHROSCOPY KNEE; Left knee arthroscopy with partial medial and lateral meniscectomies; Surgeon:SUREKHA WALDEN; Location:[facility]     CERVIX SURGERY       DILATION AND CURETTAGE SUCTION, TREAT INCOMPLETE       x3     ENT SURGERY      tonsilectomy.        Social History     Tobacco Use     Smoking status: Current Every Day Smoker     Packs/day: 1.00     Types: Cigarettes     Smokeless tobacco: Never Used   Substance Use Topics     Alcohol use: Yes     Alcohol/week: 0.0 standard drinks     Comment: rare     Family History   Problem Relation Age of Onset     Respiratory Father      Respiratory Brother      Glaucoma No family hx of      Macular Degeneration No family hx of            Reviewed and updated as needed this visit by Provider  Tobacco  Allergies  Meds  Problems  Med Hx  Surg Hx  Fam Hx         Review of Systems   Constitutional,  HEENT, cardiovascular, pulmonary, gi and gu systems are negative, except as otherwise noted.       Objective   Reported vitals:  There were no vitals taken for this visit.   PSYCH: Alert and oriented times 3; slurred and at times difficult to understand speech due to rate of speaking,  no tangential thoughts, no hallucinations   or delusions  Her affect is anxious  RESP: No cough, no audible wheezing, able to talk in full sentences  Remainder of exam unable to be completed due to telephone visits    Diagnostic Test Results:  none         Assessment/Plan:  1. Bipolar 1 disorder (H)  Uncontrolled. Needs to restart abilify and needs psychiatrist for long term care. I did not refill clonazepam as last fill was 1/20 and with patient's behaviors difficult to determine possible substance abuse vs amina. Will need to address benzos with psych. Advised follow up in 1 month if not in with psych and sooner if not improving back on abilify.   - ARIPiprazole (ABILIFY) 10 MG tablet; Take 1 tablet (10 mg) by mouth daily  Dispense: 30 tablet; Refill: 1  - MENTAL HEALTH REFERRAL  - Adult; Psychiatry; Psychiatry; Other: [facility] [phone]; We will contact you to schedule the appointment or please call with any questions    2. Chronic obstructive pulmonary disease, unspecified COPD type (H)  Stable, refilled spiriva. Encouraged smoking cessation.   - tiotropium (SPIRIVA HANDIHALER) 18 MCG inhaled capsule; Inhale 1 capsule (18 mcg) into the lungs daily  Dispense: 30 capsule; Refill: 1    3. Thrush  Will treat, if not improving will need office visit to assess.   - nystatin (MYCOSTATIN) 475597 UNIT/ML suspension; Take 5 mLs (500,000 Units) by mouth 4 times daily for 10 days  Dispense: 400 mL; Refill: 0    No follow-ups on file.      Phone call duration: 16 minutes    Alicia Garnett PA-C

## 2020-05-21 ENCOUNTER — TELEPHONE (OUTPATIENT)
Dept: FAMILY MEDICINE | Facility: CLINIC | Age: 50
End: 2020-05-21

## 2020-05-21 ENCOUNTER — AMBULATORY - HEALTHEAST (OUTPATIENT)
Dept: ADDICTION MEDICINE | Facility: CLINIC | Age: 50
End: 2020-05-21

## 2020-05-21 NOTE — TELEPHONE ENCOUNTER
Prior Authorization Retail Medication Request    Medication/Dose: tiotropium (SPIRIVA HANDIHALER) 18 MCG inhaled capsule  ICD code (if different than what is on RX):    Previously Tried and Failed:    Rationale:      Insurance Name:  RX [facility]  Insurance ID:  [id number]       Pharmacy Information (if different than what is on RX)  Name:  [facility]  Phone:  [phone]      CALVIN Norman MA

## 2020-05-21 NOTE — TELEPHONE ENCOUNTER
Called insurance and the alternative medication instead of tiotropium (SPIRIVA) 18 mcg inhalation capsule. This medication is approved Atrovent HFA 17mcg  inhalation 12.9 for 30 days approved. This was ran by insurance and this is the approved medication without PA needed. Please advise if you want a PA started for previous medication.          CALVIN Norman MA

## 2020-05-21 NOTE — TELEPHONE ENCOUNTER
Prior Authorization Approval    Authorization Effective Date: 2/21/2020  Authorization Expiration Date: 5/21/2021  Medication: tiotropium (SPIRIVA HANDIHALER) 18 MCG inhaled capsule-APPROVED  Approved Dose/Quantity:   Reference #:     Insurance Company: Medicare Blue - Phone [phone] Fax [phone]  Expected CoPay:       CoPay Card Available:      Foundation Assistance Needed:    Which Pharmacy is filling the prescription (Not needed for infusion/clinic administered): [facility] - [address]  Pharmacy Notified: Yes  Patient Notified: No    Pharmacy will notify patient when medication is ready.

## 2020-05-21 NOTE — TELEPHONE ENCOUNTER
Please start a PA for the spiriva.   Atrovent is a multiple times per day medication and patient compliance with this will be a problem. Patient will benefit from once per day administration.   Alicia Garnett PA-C

## 2020-05-21 NOTE — TELEPHONE ENCOUNTER
Central Prior Authorization Team   Phone: [phone]      PA Initiation    Medication: tiotropium (SPIRIVA HANDIHALER) 18 MCG inhaled capsule-Initiated  Insurance Company: Medicare Blue - Phone [phone] Fax [phone]  Pharmacy Filling the Rx: [facility] - [address]  Filling Pharmacy Phone: [phone]  Filling Pharmacy Fax:    Start Date: 5/21/2020

## 2020-05-26 ENCOUNTER — OFFICE VISIT - HEALTHEAST (OUTPATIENT)
Dept: ADDICTION MEDICINE | Facility: HOSPITAL | Age: 50
End: 2020-05-26

## 2020-05-26 DIAGNOSIS — F15.20 METHAMPHETAMINE USE DISORDER, SEVERE (H): ICD-10-CM

## 2020-05-28 ENCOUNTER — COMMUNICATION - HEALTHEAST (OUTPATIENT)
Dept: ADDICTION MEDICINE | Facility: HOSPITAL | Age: 50
End: 2020-05-28

## 2020-05-28 ENCOUNTER — AMBULATORY - HEALTHEAST (OUTPATIENT)
Dept: ADDICTION MEDICINE | Facility: HOSPITAL | Age: 50
End: 2020-05-28

## 2020-06-01 ENCOUNTER — COMMUNICATION - HEALTHEAST (OUTPATIENT)
Dept: ADDICTION MEDICINE | Facility: HOSPITAL | Age: 50
End: 2020-06-01

## 2020-06-03 ENCOUNTER — AMBULATORY - HEALTHEAST (OUTPATIENT)
Dept: ADDICTION MEDICINE | Facility: HOSPITAL | Age: 50
End: 2020-06-03

## 2020-06-05 ENCOUNTER — AMBULATORY - HEALTHEAST (OUTPATIENT)
Dept: ADDICTION MEDICINE | Facility: HOSPITAL | Age: 50
End: 2020-06-05

## 2020-06-05 ENCOUNTER — COMMUNICATION - HEALTHEAST (OUTPATIENT)
Dept: ADDICTION MEDICINE | Facility: HOSPITAL | Age: 50
End: 2020-06-05

## 2020-06-08 ENCOUNTER — COMMUNICATION - HEALTHEAST (OUTPATIENT)
Dept: ADDICTION MEDICINE | Facility: HOSPITAL | Age: 50
End: 2020-06-08

## 2020-06-09 ENCOUNTER — COMMUNICATION - HEALTHEAST (OUTPATIENT)
Dept: ADDICTION MEDICINE | Facility: HOSPITAL | Age: 50
End: 2020-06-09

## 2020-06-12 ENCOUNTER — AMBULATORY - HEALTHEAST (OUTPATIENT)
Dept: ADDICTION MEDICINE | Facility: HOSPITAL | Age: 50
End: 2020-06-12

## 2020-06-15 ENCOUNTER — COMMUNICATION - HEALTHEAST (OUTPATIENT)
Dept: ADDICTION MEDICINE | Facility: HOSPITAL | Age: 50
End: 2020-06-15

## 2020-06-15 ENCOUNTER — COMMUNICATION - HEALTHEAST (OUTPATIENT)
Dept: SCHEDULING | Facility: CLINIC | Age: 50
End: 2020-06-15

## 2020-06-16 ENCOUNTER — COMMUNICATION - HEALTHEAST (OUTPATIENT)
Dept: ADDICTION MEDICINE | Facility: HOSPITAL | Age: 50
End: 2020-06-16

## 2020-06-19 ENCOUNTER — AMBULATORY - HEALTHEAST (OUTPATIENT)
Dept: LAB | Facility: HOSPITAL | Age: 50
End: 2020-06-19

## 2020-06-19 ENCOUNTER — AMBULATORY - HEALTHEAST (OUTPATIENT)
Dept: ADDICTION MEDICINE | Facility: HOSPITAL | Age: 50
End: 2020-06-19

## 2020-06-19 DIAGNOSIS — F15.20 METHAMPHETAMINE USE DISORDER, SEVERE (H): ICD-10-CM

## 2020-06-19 DIAGNOSIS — F15.11 OTHER STIMULANT ABUSE, IN REMISSION (H): ICD-10-CM

## 2020-06-19 LAB
AMPHETAMINES UR QL SCN: NORMAL
BARBITURATES UR QL: NORMAL
BENZODIAZ UR QL: NORMAL
CANNABINOIDS UR QL SCN: NORMAL
COCAINE UR QL: NORMAL
CREAT UR-MCNC: 62.8 MG/DL
METHADONE UR QL SCN: NORMAL
OPIATES UR QL SCN: NORMAL
OXYCODONE UR QL: NORMAL
PCP UR QL SCN: NORMAL

## 2020-06-25 ENCOUNTER — TRANSFERRED RECORDS (OUTPATIENT)
Dept: HEALTH INFORMATION MANAGEMENT | Facility: CLINIC | Age: 50
End: 2020-06-25

## 2020-09-21 ENCOUNTER — TRANSFERRED RECORDS (OUTPATIENT)
Dept: HEALTH INFORMATION MANAGEMENT | Facility: CLINIC | Age: 50
End: 2020-09-21

## 2020-10-15 ENCOUNTER — OFFICE VISIT - HEALTHEAST (OUTPATIENT)
Dept: ADDICTION MEDICINE | Facility: CLINIC | Age: 50
End: 2020-10-15

## 2020-10-15 DIAGNOSIS — F15.20 METHAMPHETAMINE USE DISORDER, MODERATE (H): ICD-10-CM

## 2020-10-19 ENCOUNTER — OFFICE VISIT - HEALTHEAST (OUTPATIENT)
Dept: BEHAVIORAL HEALTH | Facility: CLINIC | Age: 50
End: 2020-10-19

## 2020-10-19 DIAGNOSIS — F31.70 BIPOLAR AFFECTIVE DISORDER IN REMISSION (H): ICD-10-CM

## 2020-10-19 ASSESSMENT — ANXIETY QUESTIONNAIRES
7. FEELING AFRAID AS IF SOMETHING AWFUL MIGHT HAPPEN: SEVERAL DAYS
4. TROUBLE RELAXING: NOT AT ALL
3. WORRYING TOO MUCH ABOUT DIFFERENT THINGS: SEVERAL DAYS
6. BECOMING EASILY ANNOYED OR IRRITABLE: NOT AT ALL
GAD7 TOTAL SCORE: 6
2. NOT BEING ABLE TO STOP OR CONTROL WORRYING: MORE THAN HALF THE DAYS
5. BEING SO RESTLESS THAT IT IS HARD TO SIT STILL: NOT AT ALL
IF YOU CHECKED OFF ANY PROBLEMS ON THIS QUESTIONNAIRE, HOW DIFFICULT HAVE THESE PROBLEMS MADE IT FOR YOU TO DO YOUR WORK, TAKE CARE OF THINGS AT HOME, OR GET ALONG WITH OTHER PEOPLE: SOMEWHAT DIFFICULT
1. FEELING NERVOUS, ANXIOUS, OR ON EDGE: MORE THAN HALF THE DAYS

## 2020-10-19 ASSESSMENT — PATIENT HEALTH QUESTIONNAIRE - PHQ9: SUM OF ALL RESPONSES TO PHQ QUESTIONS 1-9: 9

## 2020-10-26 ENCOUNTER — OFFICE VISIT - HEALTHEAST (OUTPATIENT)
Dept: BEHAVIORAL HEALTH | Facility: CLINIC | Age: 50
End: 2020-10-26

## 2020-10-26 ENCOUNTER — COMMUNICATION - HEALTHEAST (OUTPATIENT)
Dept: ADDICTION MEDICINE | Facility: CLINIC | Age: 50
End: 2020-10-26

## 2020-10-26 DIAGNOSIS — F31.9 BIPOLAR I DISORDER (H): ICD-10-CM

## 2020-11-02 ENCOUNTER — AMBULATORY - HEALTHEAST (OUTPATIENT)
Dept: ADDICTION MEDICINE | Facility: CLINIC | Age: 50
End: 2020-11-02

## 2020-11-02 ENCOUNTER — OFFICE VISIT - HEALTHEAST (OUTPATIENT)
Dept: ADDICTION MEDICINE | Facility: CLINIC | Age: 50
End: 2020-11-02

## 2020-11-02 ENCOUNTER — OFFICE VISIT - HEALTHEAST (OUTPATIENT)
Dept: BEHAVIORAL HEALTH | Facility: CLINIC | Age: 50
End: 2020-11-02

## 2020-11-02 DIAGNOSIS — F15.20 METHAMPHETAMINE USE DISORDER, MODERATE (H): ICD-10-CM

## 2020-11-02 DIAGNOSIS — F31.70 BIPOLAR AFFECTIVE DISORDER IN REMISSION (H): ICD-10-CM

## 2020-11-04 ENCOUNTER — OFFICE VISIT - HEALTHEAST (OUTPATIENT)
Dept: ADDICTION MEDICINE | Facility: CLINIC | Age: 50
End: 2020-11-04

## 2020-11-04 DIAGNOSIS — F15.20 METHAMPHETAMINE USE DISORDER, MODERATE (H): ICD-10-CM

## 2020-11-05 ENCOUNTER — COMMUNICATION - HEALTHEAST (OUTPATIENT)
Dept: ADDICTION MEDICINE | Facility: CLINIC | Age: 50
End: 2020-11-05

## 2020-11-05 ENCOUNTER — AMBULATORY - HEALTHEAST (OUTPATIENT)
Dept: ADDICTION MEDICINE | Facility: CLINIC | Age: 50
End: 2020-11-05

## 2020-11-06 ENCOUNTER — OFFICE VISIT - HEALTHEAST (OUTPATIENT)
Dept: ADDICTION MEDICINE | Facility: CLINIC | Age: 50
End: 2020-11-06

## 2020-11-06 DIAGNOSIS — F15.20 METHAMPHETAMINE USE DISORDER, MODERATE (H): ICD-10-CM

## 2020-11-09 ENCOUNTER — OFFICE VISIT - HEALTHEAST (OUTPATIENT)
Dept: ADDICTION MEDICINE | Facility: CLINIC | Age: 50
End: 2020-11-09

## 2020-11-09 DIAGNOSIS — F15.20 METHAMPHETAMINE USE DISORDER, MODERATE (H): ICD-10-CM

## 2020-11-11 ENCOUNTER — OFFICE VISIT - HEALTHEAST (OUTPATIENT)
Dept: ADDICTION MEDICINE | Facility: CLINIC | Age: 50
End: 2020-11-11

## 2020-11-11 DIAGNOSIS — F15.20 METHAMPHETAMINE USE DISORDER, MODERATE (H): ICD-10-CM

## 2020-11-12 ENCOUNTER — AMBULATORY - HEALTHEAST (OUTPATIENT)
Dept: ADDICTION MEDICINE | Facility: CLINIC | Age: 50
End: 2020-11-12

## 2020-11-13 ENCOUNTER — OFFICE VISIT - HEALTHEAST (OUTPATIENT)
Dept: ADDICTION MEDICINE | Facility: CLINIC | Age: 50
End: 2020-11-13

## 2020-11-13 DIAGNOSIS — F15.20 METHAMPHETAMINE USE DISORDER, MODERATE (H): ICD-10-CM

## 2020-11-16 ENCOUNTER — OFFICE VISIT - HEALTHEAST (OUTPATIENT)
Dept: ADDICTION MEDICINE | Facility: CLINIC | Age: 50
End: 2020-11-16

## 2020-11-16 DIAGNOSIS — F15.20 METHAMPHETAMINE USE DISORDER, MODERATE (H): ICD-10-CM

## 2020-11-18 ENCOUNTER — OFFICE VISIT - HEALTHEAST (OUTPATIENT)
Dept: ADDICTION MEDICINE | Facility: CLINIC | Age: 50
End: 2020-11-18

## 2020-11-18 DIAGNOSIS — F15.20 METHAMPHETAMINE USE DISORDER, MODERATE (H): ICD-10-CM

## 2020-11-19 ENCOUNTER — AMBULATORY - HEALTHEAST (OUTPATIENT)
Dept: ADDICTION MEDICINE | Facility: CLINIC | Age: 50
End: 2020-11-19

## 2020-11-19 ENCOUNTER — AMBULATORY - HEALTHEAST (OUTPATIENT)
Dept: BEHAVIORAL HEALTH | Facility: CLINIC | Age: 50
End: 2020-11-19

## 2020-11-19 ENCOUNTER — OFFICE VISIT - HEALTHEAST (OUTPATIENT)
Dept: BEHAVIORAL HEALTH | Facility: CLINIC | Age: 50
End: 2020-11-19

## 2020-11-19 DIAGNOSIS — F31.70 BIPOLAR AFFECTIVE DISORDER IN REMISSION (H): ICD-10-CM

## 2020-11-20 ENCOUNTER — OFFICE VISIT - HEALTHEAST (OUTPATIENT)
Dept: ADDICTION MEDICINE | Facility: CLINIC | Age: 50
End: 2020-11-20

## 2020-11-20 DIAGNOSIS — F15.20 METHAMPHETAMINE USE DISORDER, MODERATE (H): ICD-10-CM

## 2020-11-23 ENCOUNTER — OFFICE VISIT - HEALTHEAST (OUTPATIENT)
Dept: ADDICTION MEDICINE | Facility: CLINIC | Age: 50
End: 2020-11-23

## 2020-11-23 DIAGNOSIS — F15.20 METHAMPHETAMINE USE DISORDER, MODERATE (H): ICD-10-CM

## 2020-11-25 ENCOUNTER — OFFICE VISIT - HEALTHEAST (OUTPATIENT)
Dept: ADDICTION MEDICINE | Facility: CLINIC | Age: 50
End: 2020-11-25

## 2020-11-25 DIAGNOSIS — F15.20 METHAMPHETAMINE USE DISORDER, MODERATE (H): ICD-10-CM

## 2020-11-27 ENCOUNTER — AMBULATORY - HEALTHEAST (OUTPATIENT)
Dept: ADDICTION MEDICINE | Facility: CLINIC | Age: 50
End: 2020-11-27

## 2020-11-30 ENCOUNTER — OFFICE VISIT - HEALTHEAST (OUTPATIENT)
Dept: ADDICTION MEDICINE | Facility: CLINIC | Age: 50
End: 2020-11-30

## 2020-11-30 DIAGNOSIS — F15.20 METHAMPHETAMINE USE DISORDER, MODERATE (H): ICD-10-CM

## 2020-12-02 ENCOUNTER — OFFICE VISIT - HEALTHEAST (OUTPATIENT)
Dept: ADDICTION MEDICINE | Facility: CLINIC | Age: 50
End: 2020-12-02

## 2020-12-02 DIAGNOSIS — F15.20 METHAMPHETAMINE USE DISORDER, MODERATE (H): ICD-10-CM

## 2020-12-03 ENCOUNTER — AMBULATORY - HEALTHEAST (OUTPATIENT)
Dept: ADDICTION MEDICINE | Facility: CLINIC | Age: 50
End: 2020-12-03

## 2020-12-04 ENCOUNTER — OFFICE VISIT - HEALTHEAST (OUTPATIENT)
Dept: ADDICTION MEDICINE | Facility: CLINIC | Age: 50
End: 2020-12-04

## 2020-12-04 DIAGNOSIS — F15.20 METHAMPHETAMINE USE DISORDER, MODERATE (H): ICD-10-CM

## 2020-12-07 ENCOUNTER — OFFICE VISIT - HEALTHEAST (OUTPATIENT)
Dept: ADDICTION MEDICINE | Facility: CLINIC | Age: 50
End: 2020-12-07

## 2020-12-07 DIAGNOSIS — F15.20 METHAMPHETAMINE USE DISORDER, MODERATE (H): ICD-10-CM

## 2020-12-09 ENCOUNTER — OFFICE VISIT - HEALTHEAST (OUTPATIENT)
Dept: BEHAVIORAL HEALTH | Facility: CLINIC | Age: 50
End: 2020-12-09

## 2020-12-09 ENCOUNTER — OFFICE VISIT - HEALTHEAST (OUTPATIENT)
Dept: ADDICTION MEDICINE | Facility: CLINIC | Age: 50
End: 2020-12-09

## 2020-12-09 DIAGNOSIS — F31.70 BIPOLAR AFFECTIVE DISORDER IN REMISSION (H): ICD-10-CM

## 2020-12-09 DIAGNOSIS — F15.20 METHAMPHETAMINE USE DISORDER, MODERATE (H): ICD-10-CM

## 2020-12-10 ENCOUNTER — COMMUNICATION - HEALTHEAST (OUTPATIENT)
Dept: ADDICTION MEDICINE | Facility: CLINIC | Age: 50
End: 2020-12-10

## 2020-12-10 ENCOUNTER — AMBULATORY - HEALTHEAST (OUTPATIENT)
Dept: ADDICTION MEDICINE | Facility: CLINIC | Age: 50
End: 2020-12-10

## 2020-12-11 ENCOUNTER — OFFICE VISIT - HEALTHEAST (OUTPATIENT)
Dept: ADDICTION MEDICINE | Facility: CLINIC | Age: 50
End: 2020-12-11

## 2020-12-11 ENCOUNTER — COMMUNICATION - HEALTHEAST (OUTPATIENT)
Dept: ADDICTION MEDICINE | Facility: HOSPITAL | Age: 50
End: 2020-12-11

## 2020-12-11 DIAGNOSIS — F15.20 METHAMPHETAMINE USE DISORDER, MODERATE (H): ICD-10-CM

## 2020-12-14 ENCOUNTER — OFFICE VISIT - HEALTHEAST (OUTPATIENT)
Dept: ADDICTION MEDICINE | Facility: CLINIC | Age: 50
End: 2020-12-14

## 2020-12-14 DIAGNOSIS — F15.20 METHAMPHETAMINE USE DISORDER, MODERATE (H): ICD-10-CM

## 2020-12-15 ENCOUNTER — COMMUNICATION - HEALTHEAST (OUTPATIENT)
Dept: ADDICTION MEDICINE | Facility: CLINIC | Age: 50
End: 2020-12-15

## 2020-12-16 ENCOUNTER — OFFICE VISIT - HEALTHEAST (OUTPATIENT)
Dept: ADDICTION MEDICINE | Facility: CLINIC | Age: 50
End: 2020-12-16

## 2020-12-16 DIAGNOSIS — F15.20 METHAMPHETAMINE USE DISORDER, MODERATE (H): ICD-10-CM

## 2020-12-17 ENCOUNTER — COMMUNICATION - HEALTHEAST (OUTPATIENT)
Dept: ADDICTION MEDICINE | Facility: CLINIC | Age: 50
End: 2020-12-17

## 2020-12-17 ENCOUNTER — AMBULATORY - HEALTHEAST (OUTPATIENT)
Dept: ADDICTION MEDICINE | Facility: CLINIC | Age: 50
End: 2020-12-17

## 2020-12-18 ENCOUNTER — OFFICE VISIT - HEALTHEAST (OUTPATIENT)
Dept: ADDICTION MEDICINE | Facility: CLINIC | Age: 50
End: 2020-12-18

## 2020-12-18 DIAGNOSIS — F15.20 METHAMPHETAMINE USE DISORDER, MODERATE (H): ICD-10-CM

## 2020-12-21 ENCOUNTER — OFFICE VISIT - HEALTHEAST (OUTPATIENT)
Dept: ADDICTION MEDICINE | Facility: CLINIC | Age: 50
End: 2020-12-21

## 2020-12-21 DIAGNOSIS — F15.20 METHAMPHETAMINE USE DISORDER, MODERATE (H): ICD-10-CM

## 2020-12-23 ENCOUNTER — OFFICE VISIT - HEALTHEAST (OUTPATIENT)
Dept: ADDICTION MEDICINE | Facility: CLINIC | Age: 50
End: 2020-12-23

## 2020-12-23 DIAGNOSIS — F15.20 METHAMPHETAMINE USE DISORDER, MODERATE (H): ICD-10-CM

## 2020-12-24 ENCOUNTER — AMBULATORY - HEALTHEAST (OUTPATIENT)
Dept: ADDICTION MEDICINE | Facility: CLINIC | Age: 50
End: 2020-12-24

## 2020-12-28 ENCOUNTER — OFFICE VISIT - HEALTHEAST (OUTPATIENT)
Dept: ADDICTION MEDICINE | Facility: CLINIC | Age: 50
End: 2020-12-28

## 2020-12-28 DIAGNOSIS — F15.20 METHAMPHETAMINE USE DISORDER, MODERATE (H): ICD-10-CM

## 2020-12-30 ENCOUNTER — AMBULATORY - HEALTHEAST (OUTPATIENT)
Dept: BEHAVIORAL HEALTH | Facility: CLINIC | Age: 50
End: 2020-12-30

## 2020-12-30 ENCOUNTER — OFFICE VISIT - HEALTHEAST (OUTPATIENT)
Dept: ADDICTION MEDICINE | Facility: CLINIC | Age: 50
End: 2020-12-30

## 2020-12-30 DIAGNOSIS — F15.20 METHAMPHETAMINE USE DISORDER, MODERATE (H): ICD-10-CM

## 2020-12-31 ENCOUNTER — AMBULATORY - HEALTHEAST (OUTPATIENT)
Dept: ADDICTION MEDICINE | Facility: CLINIC | Age: 50
End: 2020-12-31

## 2021-01-04 ENCOUNTER — OFFICE VISIT - HEALTHEAST (OUTPATIENT)
Dept: ADDICTION MEDICINE | Facility: CLINIC | Age: 51
End: 2021-01-04

## 2021-01-04 DIAGNOSIS — F15.20 METHAMPHETAMINE USE DISORDER, MODERATE (H): ICD-10-CM

## 2021-01-07 ENCOUNTER — AMBULATORY - HEALTHEAST (OUTPATIENT)
Dept: ADDICTION MEDICINE | Facility: CLINIC | Age: 51
End: 2021-01-07

## 2021-01-07 ENCOUNTER — COMMUNICATION - HEALTHEAST (OUTPATIENT)
Dept: ADDICTION MEDICINE | Facility: CLINIC | Age: 51
End: 2021-01-07

## 2021-01-08 ENCOUNTER — COMMUNICATION - HEALTHEAST (OUTPATIENT)
Dept: ADDICTION MEDICINE | Facility: CLINIC | Age: 51
End: 2021-01-08

## 2021-01-11 ENCOUNTER — COMMUNICATION - HEALTHEAST (OUTPATIENT)
Dept: ADDICTION MEDICINE | Facility: CLINIC | Age: 51
End: 2021-01-11

## 2021-01-13 ENCOUNTER — AMBULATORY - HEALTHEAST (OUTPATIENT)
Dept: ADDICTION MEDICINE | Facility: CLINIC | Age: 51
End: 2021-01-13

## 2021-01-13 ENCOUNTER — AMBULATORY - HEALTHEAST (OUTPATIENT)
Dept: BEHAVIORAL HEALTH | Facility: CLINIC | Age: 51
End: 2021-01-13

## 2021-01-14 ENCOUNTER — AMBULATORY - HEALTHEAST (OUTPATIENT)
Dept: ADDICTION MEDICINE | Facility: CLINIC | Age: 51
End: 2021-01-14

## 2021-01-15 ENCOUNTER — COMMUNICATION - HEALTHEAST (OUTPATIENT)
Dept: ADDICTION MEDICINE | Facility: CLINIC | Age: 51
End: 2021-01-15

## 2021-05-26 ASSESSMENT — PATIENT HEALTH QUESTIONNAIRE - PHQ9: SUM OF ALL RESPONSES TO PHQ QUESTIONS 1-9: 9

## 2021-05-28 ASSESSMENT — ANXIETY QUESTIONNAIRES: GAD7 TOTAL SCORE: 6

## 2021-05-31 VITALS — HEIGHT: 69 IN | WEIGHT: 186 LBS | BODY MASS INDEX: 27.55 KG/M2

## 2021-06-03 NOTE — PROGRESS NOTES
Jaylin Easley attended 3 hours of group today.     The group topic was Thinking, patient was responsive to topic.     Patient's engagement in the group session: high     Total group size: 2    Pt attended her first IOP group.     LINDA Garcia  11/27/2019, 12:00 PM

## 2021-06-03 NOTE — PROGRESS NOTES
Pt presented early for her scheduled Co-Occurring Disorders IOP Intake. During course of intake she noted she did not know how she would get to treatment from her sober house in [place] as apparently her 's License is currently suspended. Pt noted having no transportation benefit through her Medicare coverage. Pt and counselor reviewed this obstacle and she indicated understanding this level of care had been recommended by her Inpatient Treatment counselor from [facility] and as such is almost certainly required per terms of her [facility] probation. Pt indicated she would elect not to begin treatment at this time due to transportation barriers. She signed an ANDREW permitting counselor to communicate this to her . Counselor provided Pt with his business card and contact information for the OP Treatment Coordinator and invited her to contact us anytime if she would like services.

## 2021-06-03 NOTE — PROGRESS NOTES
Pt completed a brief diagnostic assessment on 10/23/19 as part of her Inpatient CD Tx episode. Assessment was conducted by LINDA Baumann. Pt's diagnosis is Bipolar disorder, current episode depressed, moderate  PTSD
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
## 2021-06-03 NOTE — PROGRESS NOTES
Outpatient Substance Use Disorder Treatment - Initial Services Plan     Patient  Name: Jaylin Easley  MRN: [id number]   : 1970  Admit Date: 19       Patient describes their immediate need: To learn recovery skills to prevent relapse.     Are there any immediate Safety Needs such as (physical, stability, mobility): No. Pt is able to get medical care as needed. Pt denies immediate concerns.     Immediate Health Needs and Plan: Pt will continue to reside at her sober living residence in [place]. She will resume weekly individual therapy with her provider through [facility]. She will continue to attend her weekly AA and NA meetings. She has a safety plan for the Thanksgiving holiday.      Vulnerable Adult: No     Issues to be addressed in the first sessions: Sponsor; Resuming Therapy; Ambivalence;     Patient strengths and needs:   Strengths identified as Good Listener; Patient; Friendly;   Needs identified as Financial;     Plan for patient for time between intake and completion of the treatment plan:   Attend all group therapy sessions as directed, complete all written and oral assignments as directed, and remain clean and sober. A relapse, if any, must be reported to staff immediately in order to ensure you are receiving the proper level of care. If you cannot attend a group therapy session you must call contact information provided in intake folder and leave a message before or during group hours.       Vulnerable Adult Review   [X] Review of the Facility Abuse Prevention plan was reviewed with the patient   [X] No Individual Abuse Plan is necessary   [ ] In addition to the Facility Abuse Prevention plan, an Individual Abuse Plan will be put in place       Staff Name/Title: DANAE GarciaSW   Date: 2019  Time: 2:45 PM

## 2021-06-03 NOTE — PROGRESS NOTES
Weekly Progress Note  Jaylin L Kaushal  1970  [id number]      D) Pt attended 1 groups this week with 0 absences. Patient attended 0 individual sessions this week. A) Staff facilitated groups and reviewed tx progress. Assessed for [facility]. R) No VAP needed at this time.     Any significant events, defines as events that impact patients relationship with others inside and outside of treatment No  Indicate any changes or monitoring of physical or mental health problems No    Indicate involvement by any outside supports Yes: Pt lives in a women's sober house in [place].   IAPP reviewed and modified as needed. NA  Pt working on the following dimensions:    Dimension #1 - Withdrawal Potential - Risk 0. No current concerns. Pt reports no chemical use since her IOP Intake on 11/25. UA orders are pending.   Specific goals from treatment plan addressed this week:  Patient to maintain abstinence throughout outpatient treatment.   Effectiveness of strategies:  Strategies appear to be effective.     Dimension #2 - Biomedical - Risk 1. Pt reports the following biomedical conditions: psoriasis, psoriatic arthritis, and COPD.  Specific goals from treatment plan addressed this week:  Patient to maintain stable health throughout outpatient treatment.   Effectiveness of strategies:  Strategies appear to be effective.    Dimension #3 - Emotional/Behavioral/Cognitive - Risk 2. EMR shows current diagnoses of Bipolar disorder, current episode depressed, moderate; PTSD. Pt denies any current mental health symptoms/concerns though does note daily passive SI. She has agreed to schedule an appointment with her provider, George Mane, PhD, LP at [facility].  Active interventions to stabilize mental health symptoms:  Pt reports daily interaction with women in her sober house.  Specific goals from treatment plan addressed this week:  Establish and maintain mental and emotional health.  Effectiveness of strategies:  Strategies  appear to be effective.    Dimension #4 - Treatment Acceptance/Resistance - Risk 2. Pt attended her first group and acknowledged being mandated to Tx per terms of her probation. She asked clarifying questions around why she would be recommended to IOP following IP Tx. She indicated her intention to participate and hop[e she would get something out of treatment.   Specific goals from treatment plan addressed this week:  Patient to increase motivation towards recovery by participating in outpatient programming.   Effectiveness of strategies:  Strategies appear to be effective.    Dimension #5 - Relapse Potential - Risk 3. Pt denied any chemical use or cravings though noted passing thoughts. It remains unclear to what degree she understands addiction as a maladaptive coping pattern. She reviewed plans to spend Thanksgiving with friends who support her recovery.   Specific goals from treatment plan addressed this week:  Develop and utilize practical, effective relapse-prevention strategies and tools.   Effectiveness of strategies:  Strategies appear to be effective.    Dimension #6 - Recovery Environment - Risk 2. Pt currently lives in a sober house in [place] with 5 other sober women. She reports attending AA/NA 2-3x/wk, plus bi-weekly Orthodoxy attendance. She receives medication management through her PCP and plans to restart individual psychotherapy with George Mane, PhD, LP at [facility]. She is on probation in [facility] through 2025/26. Pt reports having some friends who support her recovery.   Specific goals from treatment plan addressed this week:  Establish and engage a widespread, redundant recovery support network.   Effectiveness of strategies:  Strategies appear to be effective.    T) Treatment plan updated yes.  Patient notified and in agreement Yes.    Patient educated on Thinking. Patient has completed 3 of 115 program hours at this time.     Projected discharge date is 5/15/19. Current  discharge plan is PENDING.     DARCIE Garcia, [facility], [facility]  11/27/2019, 12:56 PM      Weekly Educational Topics Date   1. Understanding Dual Diagnoses, week 1    2. Understanding Dual Diagnoses, week 2    3. Stress Management    4. Feelings/Emotions    5. Thinking 11/27/19;    6. Building Recovery Support    7. Developing Assertive Communication Skills    8. Relapse Prevention    9. Relationships/Boundaries    10. Grief and Loss    11. Strengths    12. Wellness

## 2021-06-03 NOTE — PROGRESS NOTES
Jaylin Easley attended 3 hours of group today.     The group topic was Recovery Support, patient was responsive to topic.     Patient's engagement in the group session: high     Total group size: 3      LINDA Garcia  12/2/2019, 1:05 PM
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
## 2021-06-03 NOTE — TELEPHONE ENCOUNTER
Called and spoke to Pt's [facility]  Melanie Polanco (ANDREW on file) and communicated Pt's decision to defer beginning IOP at this time due to reported transportation issues. Informed PO that [facility] does provide Substance Use Disorder through our [place] location which is considerably closer to where Pt currently resides, but Pt would likely need to apply for consolidated funding as she currently has Medicare which that location does not accept. Provided this writer and OP  contact information to PO.

## 2021-06-04 NOTE — PROGRESS NOTES
Pt met briefly with counselor after group to review her UA from this morning. Pt's UA screens NEG. She admitted to using methamphetamine with cocaine on 12/10, which explained her POS UA screens on 12/11 and 12/13. She was unable to indicate why she felt the need to be untruthful around her chemical use. Pt reports she met with her  on 12/18 and was given 4 weeks to locate and move into a sober house and stated she has a tour of a facility in [place] scheduled for this weekend. Pt signed a Treatment Compliance Contract stipulating she will abstain from all non-prescribed mood-altering substances moving forward and will be forthright and truthful around any chemical use - either to counselor individually or to the group as a whole - in the even she suffers a relapse. Pt understands that she will likely not be discharged from programming simply for chemical use but that dishonesty around use is considered non-compliance and may result in administrative discharge and referral to a higher level of care and/or back to probation.

## 2021-06-04 NOTE — PROGRESS NOTES
"Jaylin BONILLA Immanuelchato attended 3 hours of group today.     The group topic was Relationships/Boundaries, patient was responsive to topic.     Patient's engagement in the group session: medium     Total group size: 5    Pt shared she has been experiencing depression over the holidays. She cited having to live in a sober house, financial strains and having not been invited to a sister's Nubieber party which makes her feel like a \"black sheep of my family.\" She indicated such feelings would have led her to use in the past but that she recognizes that at this point the costs of returning to active addiction far outweigh the consequences. She was receptive to feedback and support from program peers.     Jignesh Clrak, [facility]  12/27/2019, 1:07 PM  "

## 2021-06-04 NOTE — PROGRESS NOTES
Jaylin Easley attended 3 hours of group today.     The group topic was Relapse Prevention, patient was responsive to topic.     Patient's engagement in the group session: medium     Total group size: 5    Pt reports continuing abstinence from all mood-altering substances.     Jignesh Clark, [facility]  12/16/2019, 1:06 PM

## 2021-06-04 NOTE — PROGRESS NOTES
Jaylin Easley attended 3 hours of group today.     The group topic was Communication Skills, patient was responsive to topic.     Patient's engagement in the group session: medium    Total group size: 4    Pt reports using methamphetamine on 12/4/19 following her being texted by an ex-boyfriend the day before. She denies any subsequent use. Pt will meet with counselor following group for 1x1 counseling session.       Jignesh Clark, [facility]  12/9/2019, 1:13 PM

## 2021-06-04 NOTE — PROGRESS NOTES
Jaylin Easley attended 3 hours of group today.     The group topic was Relapse Prevention, patient was responsive to topic.     Patient's engagement in the group session: medium     Total group size: 7      LINDA Garcia  12/20/2019, 1:00 PM

## 2021-06-04 NOTE — PROGRESS NOTES
Weekly Progress Note  Jaylin BONILLA Kaushal  1970  [id number]      D) Pt attended 1 group last week and 2 groups this week with 0 absences. Patient attended 0 individual sessions this week. A) Staff facilitated groups and reviewed tx progress. Assessed for [facility]. R) No VAP needed at this time.     Any significant events, defines as events that impact patients relationship with others inside and outside of treatment: No.     Indicate any changes or monitoring of physical or mental health problems No    Indicate involvement by any outside supports: No.    IAPP reviewed and modified as needed. NA  Pt working on the following dimensions:    Dimension #1 - Withdrawal Potential - Risk 0. No current concerns. Pt tested NEG for all substances on 12/23,12/30 and 1/3.    Specific goals from treatment plan addressed this week:  Patient to maintain abstinence throughout outpatient treatment.   Effectiveness of strategies:  Strategies appear to be effective at this time.     Dimension #2 - Biomedical - Risk 1. Pt reports the following biomedical conditions: psoriasis, psoriatic arthritis, and COPD.  Specific goals from treatment plan addressed this week:  Patient to maintain stable health throughout outpatient treatment.   Effectiveness of strategies:  Strategies appear to be effective.    Dimension #3 - Emotional/Behavioral/Cognitive - Risk 2. EMR shows current diagnoses of Bipolar disorder, current episode depressed, moderate; PTSD. Pt denies any current mental health symptoms/concerns though does note daily passive SI. She has changed her mind and declined to schedule an appointment with her provider, George Mane, PhD, LP at [facility] stating she doesn't really see a benefit at for individual therapy at this time. Pt reports really appreciating her new sober house and wanting to be more engaged with the residents there.    Active interventions to stabilize mental health symptoms:  Ppt denies any specific  interventions at this time.   Specific goals from treatment plan addressed this week:  Establish and maintain mental and emotional health.  Effectiveness of strategies:  Strategies appear to be effective.    Dimension #4 - Treatment Acceptance/Resistance - Risk 0. Pt is currently compliant in all program areas.  Specific goals from treatment plan addressed this week:  Patient to increase motivation towards recovery by participating in outpatient programming.   Effectiveness of strategies:  Strategies appear to be effective.    Dimension #5 - Relapse Potential - Risk 2. Pt denies chemical use over the past week. It remains unclear to what degree she understands addiction as a maladaptive coping pattern. She reports having moved into a women's sober house in [place] and has resumed attending recovery support group meetings over the past week. She has reportedly blocked the phone number of a former boyfriend whom she would use with and reported it has been easier than she'd expected. She has agreed to attend 14 AA/NA meetings in 14 days and will announce this commitment to peers in her sober house.    Specific goals from treatment plan addressed this week:  Develop and utilize practical, effective relapse-prevention strategies and tools.   Effectiveness of strategies:  Strategies appear to be effective.    Dimension #6 - Recovery Environment - Risk 3. Pt receives medication management through her PCP and Pt has decided to not schedule an appointment with her therapist George Mane, PhD, LP at [facility]. She is on probation in [facility] through 2025/26. Pt reports having some friends who support her recovery. She reports having moved into a women's sober house in [place] and has resumed attending recovery support group meetings over the past week. She has agreed to attend 14 AA/NA meetings in 14 days and will announce this commitment to peers in her sober house.   Specific goals from  treatment plan addressed this week:  Establish and engage a widespread, redundant recovery support network.   Effectiveness of strategies:  Strategies appear to be effective.    T) Treatment plan updated No.  Patient notified and in agreement NA.    Patient educated on Relationships and Boundaries last week and Grief and Loss this week. Patient has completed 37 of 115 program hours at this time.     Projected discharge date is 5/15/19. Current discharge plan is PENDING.     Jignesh Clark, DARCIE, [facility], [facility]  1/3/2020, 3:00 PM      Weekly Educational Topics Date   1. Understanding Dual Diagnoses, week 1    2. Understanding Dual Diagnoses, week 2    3. Stress Management    4. Feelings/Emotions    5. Thinking 11/27/19;    6. Building Recovery Support 12/2/19; 12/4/19;   7. Developing Assertive Communication Skills 12/9/19; 12/11/19; 12/13/19;   8. Relapse Prevention 12/16/19; 12/20/19;    9. Relationships/Boundaries 12/23/19; 12/27/19;   10. Grief and Loss 12/30/19; 1/3/20;    11. Strengths    12. Wellness

## 2021-06-04 NOTE — PROGRESS NOTES
Jaylin Easley attended 3 hours of group today.     The group topic was Grief and Loss, patient was responsive to topic.     Patient's engagement in the group session: medium     Total group size: 5      LINDA Garcia  1/3/2020, 1:55 PM

## 2021-06-04 NOTE — PROGRESS NOTES
Weekly Progress Note  Jaylin BONILLA Kaushal  1970  [id number]      D) Pt attended 3 groups this week with 0 absence. Patient attended 1 individual sessions this week. A) Staff facilitated groups and reviewed tx progress. Assessed for [facility]. R) No VAP needed at this time.     Any significant events, defines as events that impact patients relationship with others inside and outside of treatment: Yes - Pt reports she relapsed on methamphetamine on 12/4 and was subsequently discharged from her sober house in [place].   Indicate any changes or monitoring of physical or mental health problems No    Indicate involvement by any outside supports Yes: Pt has attended an AA meeting at the [facility].    IAPP reviewed and modified as needed. NA  Pt working on the following dimensions:    Dimension #1 - Withdrawal Potential - Risk 1. Pt reports using methamphetamine on 12/4, however tested POS for both amphetamine and cocaine on 12/11 and 12/13.     Specific goals from treatment plan addressed this week:  Patient to maintain abstinence throughout outpatient treatment.   Effectiveness of strategies:  Strategies appear to be ineffective at this time.     Dimension #2 - Biomedical - Risk 1. Pt reports the following biomedical conditions: psoriasis, psoriatic arthritis, and COPD.  Specific goals from treatment plan addressed this week:  Patient to maintain stable health throughout outpatient treatment.   Effectiveness of strategies:  Strategies appear to be effective.    Dimension #3 - Emotional/Behavioral/Cognitive - Risk 2. EMR shows current diagnoses of Bipolar disorder, current episode depressed, moderate; PTSD. Pt denies any current mental health symptoms/concerns though does note daily passive SI. She has agreed to schedule an appointment with her provider, George Mane, PhD, LP at [facility] though has not done this. Pt shared that she likes to stay home and despite having friends spends most of her time  alone. Pt reports some concerns about the upcoming Christmas holiday.   Active interventions to stabilize mental health symptoms:  Ppt denies any specific interventions at this time.   Specific goals from treatment plan addressed this week:  Establish and maintain mental and emotional health.  Effectiveness of strategies:  Strategies appear to be questionable at this time.    Dimension #4 - Treatment Acceptance/Resistance - Risk 2.Pt continues to actively participate in IOP. Pt was discharged from her sober house in [place] and scheduled to interview at a sober house in [place] near her mothers' home though elected to stay on her mothers' couch instead. She denies any chemical use since 12/4 though screened POS for both amphetamine and cocaine on 12/11 and 12/13. Pt has not yet scheduled an appointment with her therapist whom she reported liking and appears passively engaged in treatment at this time.   Specific goals from treatment plan addressed this week:  Patient to increase motivation towards recovery by participating in outpatient programming.   Effectiveness of strategies:  Strategies appear to be questionable at this time.    Dimension #5 - Relapse Potential - Risk 3. he denies any chemical use since 12/4 though screened POS for both amphetamine and cocaine on 12/11 and 12/13. It remains unclear to what degree she understands addiction as a maladaptive coping pattern. She reports attending an AA meeting at the [facility] and enjoying it and committed to returning over the weekend. Pt identified receiving a text from an ex-boyfriend as the impetus to her initial relapse on 12/4 though has been non-committal around taking pro-active steps - such as blocking his number on her phone - to prevent this from happening in the future.    Specific goals from treatment plan addressed this week:  Develop and utilize practical, effective relapse-prevention strategies and tools.   Effectiveness of  strategies:  Strategies appear to be ineffective at this time.    Dimension #6 - Recovery Environment - Risk 3. was discharged from her sober house in [place] and scheduled to interview at a sober house in [place] near her mothers' home though elected to stay on her mothers' couch instead. She receives medication management through her PCP and Pt has not yet scheduled an appointment with her therapist George Mane, PhD, LP at [facility] whom she reported liking. She is on probation in [facility] through 2025/26. Pt reports having some friends who support her recovery.   Specific goals from treatment plan addressed this week:  Establish and engage a widespread, redundant recovery support network.   Effectiveness of strategies:  Strategies appear to be questionable.    T) Treatment plan updated No.  Patient notified and in agreement NA.    Patient educated on Developing Assertive Communication Skills. Patient has completed 19 of 115 program hours at this time.     Projected discharge date is 5/15/19. Current discharge plan is PENDING.     DARCIE Garcia, [facility], [facility]  12/13/2019, 1:41 PM      Weekly Educational Topics Date   1. Understanding Dual Diagnoses, week 1    2. Understanding Dual Diagnoses, week 2    3. Stress Management    4. Feelings/Emotions    5. Thinking 11/27/19;    6. Building Recovery Support 12/2/19; 12/4/19;   7. Developing Assertive Communication Skills 12/9/19; 12/11/19; 12/13/19;   8. Relapse Prevention    9. Relationships/Boundaries    10. Grief and Loss    11. Strengths    12. Wellness

## 2021-06-04 NOTE — PROGRESS NOTES
"D: Met with Pt for individual counseling session. Session lasted 40 minutes.    A: Pt reports using methamphetamine the evening of Wednesday, 12/4/19. She reports subsequently being asked to submit a UA at her sober house and then electing to disclose her chemical use and choosing to leave voluntarily. She reports she has spent the past several days on different friends' couches around [facility]. She attributes her decision to use to having received a text from an ex-boyfriend on 12/3 and then engaging in texting with him, which became triggering. When queried as to why she did not bring this up in group the morning of 12/4 Pt was unable to specify a reason. She characterized this particular person as a \"cactus\" that she keeps poking.     R: Pt denies any subsequent chemical use. She appeared open to counselor feedback and education around triggers and the importance of identifying \"persons, places and situations\" that can lead to craving and chemical use. She and counselor agreed she would investigate this further after securing stable housing as she is currently homeless. Pt and counselor called some sober [facility] and spoke to a  at [facility] with [facility] in [place]. We reviewed monthly fees and the manager indicated they sometimes waive their 30-day sobriety requirement on a case-by-case basis. Manager informed us they have 2 openings and interview potential residents on Monday evenings. Pt agreed to attend [facility] and will report back to counselor tomorrow.     T: Continue abstaining from all non-prescribed mood-altering substances. Attend [facility] interview this evening. Attend AA/NA [facility] and/or tomorrow. Return to IOP on Wednesday, 12/11. Will UA on 12/11.   "

## 2021-06-04 NOTE — PROGRESS NOTES
Jaylin Easley attended 3 hours of group today.     The group topic was Communication Skills, patient was responsive to topic.     Patient's engagement in the group session: medium     Total group size: 4      LINDA Garcia  12/13/2019, 1:04 PM

## 2021-06-04 NOTE — PROGRESS NOTES
Weekly Progress Note  Jaylin CHAD Kaushal  1970  [id number]      D) Pt attended 2 groups this week with 1 absence. Patient attended 0 individual sessions this week. A) Staff facilitated groups and reviewed tx progress. Assessed for [facility]. R) No VAP needed at this time.     Any significant events, defines as events that impact patients relationship with others inside and outside of treatment No  Indicate any changes or monitoring of physical or mental health problems No    Indicate involvement by any outside supports Yes: Pt lives in a women's sober house in [place].   IAPP reviewed and modified as needed. NA  Pt working on the following dimensions:    Dimension #1 - Withdrawal Potential - Risk 0. No current concerns. Pt reports no chemical use since her IOP Intake on 11/25.    Specific goals from treatment plan addressed this week:  Patient to maintain abstinence throughout outpatient treatment.   Effectiveness of strategies:  Strategies appear to be effective.     Dimension #2 - Biomedical - Risk 1. Pt reports the following biomedical conditions: psoriasis, psoriatic arthritis, and COPD.  Specific goals from treatment plan addressed this week:  Patient to maintain stable health throughout outpatient treatment.   Effectiveness of strategies:  Strategies appear to be effective.    Dimension #3 - Emotional/Behavioral/Cognitive - Risk 2. EMR shows current diagnoses of Bipolar disorder, current episode depressed, moderate; PTSD. Pt denies any current mental health symptoms/concerns though does note daily passive SI. She has agreed to schedule an appointment with her provider, George Mane, PhD, [facility] at [facility]. Pt shared that she likes to stay home and despite having friends spends most of her time alone.   Active interventions to stabilize mental health symptoms:  Pt reports daily interaction with women in her sober house.  Specific goals from treatment plan addressed this week:  Establish and maintain  mental and emotional health.  Effectiveness of strategies:  Strategies appear to be effective.    Dimension #4 - Treatment Acceptance/Resistance - Risk 2.Pt continues to actively participate in IOP. She is living in a sober environment per recommendations of her IP Tx Program. It remains unclear he degree of pro-active motivation at this time.  Specific goals from treatment plan addressed this week:  Patient to increase motivation towards recovery by participating in outpatient programming.   Effectiveness of strategies:  Strategies appear to be effective.    Dimension #5 - Relapse Potential - Risk 3. Pt denied any chemical use or cravings though noted passing thoughts. It remains unclear to what degree she understands addiction as a maladaptive coping pattern. She reported maintaining abstinence over the Thanksgiving holiday. She identified a new meeting to attend in the coming weeks at the [facility].    Specific goals from treatment plan addressed this week:  Develop and utilize practical, effective relapse-prevention strategies and tools.   Effectiveness of strategies:  Strategies appear to be effective.    Dimension #6 - Recovery Environment - Risk 2. Pt currently lives in a sober house in [place] with 5 other sober women. She reports attending AA/NA 2-3x/wk, plus bi-weekly [employer] attendance. She receives medication management through her PCP and plans to restart individual psychotherapy with George Mane, PhD, LP at [facility]. She is on probation in [facility] through 2025/26. Pt reports having some friends who support her recovery.   Specific goals from treatment plan addressed this week:  Establish and engage a widespread, redundant recovery support network.   Effectiveness of strategies:  Strategies appear to be effective.    T) Treatment plan updated No.  Patient notified and in agreement NA.    Patient educated on Building Recovery Support. Patient has completed 9 of 115 program  hours at this time.     Projected discharge date is 5/15/19. Current discharge plan is PENDING.     DARCIE Garcia, [facility], [facility]  12/6/2019, 11:20 AM      Weekly Educational Topics Date   1. Understanding Dual Diagnoses, week 1    2. Understanding Dual Diagnoses, week 2    3. Stress Management    4. Feelings/Emotions    5. Thinking 11/27/19;    6. Building Recovery Support 12/2/19; 12/4/19;   7. Developing Assertive Communication Skills    8. Relapse Prevention    9. Relationships/Boundaries    10. Grief and Loss    11. Strengths    12. Wellness

## 2021-06-04 NOTE — PROGRESS NOTES
Jaylin Easley attended 3 hours of group today.     The group topic was Recovery Support, patient was responsive to topic.     Patient's engagement in the group session: high     Total group size: 3      LINDA Garcia  12/4/2019, 1:40 PM

## 2021-06-04 NOTE — PROGRESS NOTES
Weekly Progress Note  Jaylin BOINLLA Kaushal  1970  [id number]      D) Pt attended 1 group this week with 0 absences. Patient attended 0 individual sessions this week. A) Staff facilitated groups and reviewed tx progress. Assessed for [facility]. R) No VAP needed at this time.     Any significant events, defines as events that impact patients relationship with others inside and outside of treatment: Yes: Pt reports moving into a new sober house in [place].     Indicate any changes or monitoring of physical or mental health problems No    Indicate involvement by any outside supports: No.    IAPP reviewed and modified as needed. NA  Pt working on the following dimensions:    Dimension #1 - Withdrawal Potential - Risk 0. No current concerns. Pt tested NEG for all substances on 12/20 and 12/23.    Specific goals from treatment plan addressed this week:  Patient to maintain abstinence throughout outpatient treatment.   Effectiveness of strategies:  Strategies appear to be effective at this time.     Dimension #2 - Biomedical - Risk 1. Pt reports the following biomedical conditions: psoriasis, psoriatic arthritis, and COPD.  Specific goals from treatment plan addressed this week:  Patient to maintain stable health throughout outpatient treatment.   Effectiveness of strategies:  Strategies appear to be effective.    Dimension #3 - Emotional/Behavioral/Cognitive - Risk 2. EMR shows current diagnoses of Bipolar disorder, current episode depressed, moderate; PTSD. Pt denies any current mental health symptoms/concerns though does note daily passive SI. She has changed her mind and declined to schedule an appointment with her provider, George Mane, PhD, LP at [facility] stating she doesn't really see a benefit at for individual therapy at this time. Pt shared that she likes to stay home and despite having friends spends most of her time alone. Pt reported some concerns about the holidays.   Active interventions to  stabilize mental health symptoms:  Ppt denies any specific interventions at this time.   Specific goals from treatment plan addressed this week:  Establish and maintain mental and emotional health.  Effectiveness of strategies:  Strategies appear to be questionable at this time.    Dimension #4 - Treatment Acceptance/Resistance - Risk 3. Pt submitted a UA on 12/20 and 12/23 that screens NEG. She admitted to using methamphetamine with cocaine on 12/10, which explained her POS UA screens on 12/11 and 12/13. She was unable to indicate why she felt the need to be untruthful around her chemical use. Pt reports she met with her  on 12/18 and was given 4 weeks to locate and move into a sober house and stated she has a tour of a facility in [place] scheduled for this weekend. Pt signed a Treatment Compliance Contract stipulating she will abstain from all non-prescribed mood-altering substances moving forward and will be forthright and truthful around any chemical use - either to counselor individually or to the group as a whole - in the event she suffers a relapse. Pt understands that she will likely not be discharged from programming simply for chemical use but that dishonesty around use is considered non-compliance and may result in administrative discharge and referral to a higher level of care and/or back to probation. Pt has still not scheduled an appointment with her therapist [facility].   Specific goals from treatment plan addressed this week:  Patient to increase motivation towards recovery by participating in outpatient programming.   Effectiveness of strategies:  Strategies appear to be questionable at this time.    Dimension #5 - Relapse Potential - Risk 3. Pt denies chemical use over the past week. It remains unclear to what degree she understands addiction as a maladaptive coping pattern. She reports having moved into a women's sober house in  [place] and has resumed attending recovery support group meetings over the past week. Pt reports rejecting an offer from a former boyfriend to get together to use methamphetamine. She presents with limited insight around relapse prevention and appears to be primarily externally motivated through probation. She reviewed the concept of boundaries in group and was non-committal around blocking her ex-boyfriend's number so he could not contact her.   Specific goals from treatment plan addressed this week:  Develop and utilize practical, effective relapse-prevention strategies and tools.   Effectiveness of strategies:  Strategies appear to be questionable at this time.    Dimension #6 - Recovery Environment - Risk 3. Pt receives medication management through her PCP and Pt has decided to not schedule an appointment with her therapist George Mane, PhD, LP at [facility]. She is on probation in [facility] through 2025/26. Pt reports having some friends who support her recovery. She reports having moved into a women's sober house in [place] and has resumed attending recovery support group meetings over the past week.   Specific goals from treatment plan addressed this week:  Establish and engage a widespread, redundant recovery support network.   Effectiveness of strategies:  Strategies appear to be questionable.    T) Treatment plan updated No.  Patient notified and in agreement NA.    Patient educated on Relationships and Boundaries. Patient has completed 28 of 115 program hours at this time.     Projected discharge date is 5/15/19. Current discharge plan is PENDING.     DARCIE Garcia, [facility], [facility]  12/26/2019, 4:45 PM      Weekly Educational Topics Date   1. Understanding Dual Diagnoses, week 1    2. Understanding Dual Diagnoses, week 2    3. Stress Management    4. Feelings/Emotions    5. Thinking 11/27/19;    6. Building Recovery Support 12/2/19; 12/4/19;   7. Developing Assertive  Communication Skills 12/9/19; 12/11/19; 12/13/19;   8. Relapse Prevention 12/16/19; 12/20/19;    9. Relationships/Boundaries 12/23/19;    10. Grief and Loss    11. Strengths    12. Wellness

## 2021-06-04 NOTE — PROGRESS NOTES
Jaylin Easley attended 3 hours of group today.     The group topic was Communication Skills, patient was responsive to topic.     Patient's engagement in the group session: medium     Total group size: 4      LINDA Garcia  12/11/2019, 12:26 PM

## 2021-06-04 NOTE — PROGRESS NOTES
Weekly Progress Note  Jaylin Easley  1970  [id number]      D) Pt attended 2 groups this week with 1 absence. Patient attended 0 individual sessions this week. A) Staff facilitated groups and reviewed tx progress. Assessed for [facility]. R) No VAP needed at this time.     Any significant events, defines as events that impact patients relationship with others inside and outside of treatment: No.     Indicate any changes or monitoring of physical or mental health problems No    Indicate involvement by any outside supports: No.    IAPP reviewed and modified as needed. NA  Pt working on the following dimensions:    Dimension #1 - Withdrawal Potential - Risk 0. No current concerns. Pt tested NEG for all substances on 12/20..     Specific goals from treatment plan addressed this week:  Patient to maintain abstinence throughout outpatient treatment.   Effectiveness of strategies:  Strategies appear to be ineffective at this time.     Dimension #2 - Biomedical - Risk 1. Pt reports the following biomedical conditions: psoriasis, psoriatic arthritis, and COPD.  Specific goals from treatment plan addressed this week:  Patient to maintain stable health throughout outpatient treatment.   Effectiveness of strategies:  Strategies appear to be effective.    Dimension #3 - Emotional/Behavioral/Cognitive - Risk 2. EMR shows current diagnoses of Bipolar disorder, current episode depressed, moderate; PTSD. Pt denies any current mental health symptoms/concerns though does note daily passive SI. She has agreed to schedule an appointment with her provider, George Mane, PhD, LP at [facility] though has not done this. Pt shared that she likes to stay home and despite having friends spends most of her time alone. Pt reports some concerns about the upcoming [place] holiday.   Active interventions to stabilize mental health symptoms:  Ppt denies any specific interventions at this time.   Specific goals from treatment plan  addressed this week:  Establish and maintain mental and emotional health.  Effectiveness of strategies:  Strategies appear to be questionable at this time.    Dimension #4 - Treatment Acceptance/Resistance - Risk 3. Pt submitted a UA on 12/20 that screens NEG. She admitted to using methamphetamine with cocaine on 12/10, which explained her POS UA screens on 12/11 and 12/13. She was unable to indicate why she felt the need to be untruthful around her chemical use. Pt reports she met with her  on 12/18 and was given 4 weeks to locate and move into a sober house and stated she has a tour of a facility in [place] scheduled for this weekend. Pt signed a Treatment Compliance Contract stipulating she will abstain from all non-prescribed mood-altering substances moving forward and will be forthright and truthful around any chemical use - either to counselor individually or to the group as a whole - in the event she suffers a relapse. Pt understands that she will likely not be discharged from programming simply for chemical use but that dishonesty around use is considered non-compliance and may result in administrative discharge and referral to a higher level of care and/or back to probation. Pt has still not scheduled an appointment with her therapist George Mane, PhD, LP at [facility].   Specific goals from treatment plan addressed this week:  Patient to increase motivation towards recovery by participating in outpatient programming.   Effectiveness of strategies:  Strategies appear to be questionable at this time.    Dimension #5 - Relapse Potential - Risk 3. Pt reports using methamphetamine and cocaine on 12/20. It remains unclear to what degree she understands addiction as a maladaptive coping pattern. She has not attended any recovery support group meetings over the past week. Pt reports rejecting an offer from a former boyfriend to get together to use methamphetamine. She  reportedly has scheduled to tour a sober house in [place] this weekend. She presents with limited insight around relapse prevention and appears to be primarily externally motivated through probation.  Specific goals from treatment plan addressed this week:  Develop and utilize practical, effective relapse-prevention strategies and tools.   Effectiveness of strategies:  Strategies appear to be questionable at this time.    Dimension #6 - Recovery Environment - Risk 3. Pt receives medication management through her PCP and Pt has not yet scheduled an appointment with her therapist George Mane, PhD, LP at [facility] whom she reported liking. She is on probation in [facility] through 2025/26. Pt reports having some friends who support her recovery. She reportedly has scheduled to tour a sober house in [place] this weekend. She has not attended any recovery support group meetings over the past week.   Specific goals from treatment plan addressed this week:  Establish and engage a widespread, redundant recovery support network.   Effectiveness of strategies:  Strategies appear to be questionable.    T) Treatment plan updated No.  Patient notified and in agreement NA.    Patient educated on Relapse Prevention. Patient has completed 25 of 115 program hours at this time.     Projected discharge date is 5/15/19. Current discharge plan is PENDING.     DARCIE Garcia, [facility], [facility]  12/20/2019, 2:57 PM      Weekly Educational Topics Date   1. Understanding Dual Diagnoses, week 1    2. Understanding Dual Diagnoses, week 2    3. Stress Management    4. Feelings/Emotions    5. Thinking 11/27/19;    6. Building Recovery Support 12/2/19; 12/4/19;   7. Developing Assertive Communication Skills 12/9/19; 12/11/19; 12/13/19;   8. Relapse Prevention 12/16/19; 12/20/19;    9. Relationships/Boundaries    10. Grief and Loss    11. Strengths    12. Wellness

## 2021-06-04 NOTE — PROGRESS NOTES
Jaylin Easley attended 3 hours of group today.     The group topic was Grief and Loss, patient was responsive to topic.     Patient's engagement in the group session: medium     Total group size: 4      LINDA Garcia  12/30/2019, 1:12 PM

## 2021-06-04 NOTE — PROGRESS NOTES
Jaylin Easley attended 3 hours of group today.     The group topic was Relationships/Boundaries, patient was responsive to topic.     Patient's engagement in the group session: high     Total group size: 4    Pt reports moving into a new sober house over the weekend and having attended a recovery support group. Pt states she attended a holiday party the evening of 10/20 in which she was unexpectedly offered methamphetamine which she declined. She reports an ex-boyfriend she has used with recently has continually contacted her to offer using methamphetamine and cocaine. Pt appears to struggle in establishing boundaries.     Jignesh Clark, [facility]  12/23/2019, 12:39 PM

## 2021-06-04 NOTE — TELEPHONE ENCOUNTER
Pt called to notify counselor she has a meeting with her  this morning and will be absent from IOP.

## 2021-06-05 NOTE — PROGRESS NOTES
Jaylin Easley attended 3 hours of group today.     The group topic was Strengths, patient was responsive to topic.     Patient's engagement in the group session: medium     Total group size: 4      LINDA Garcia  1/8/2020, 1:14 PM

## 2021-06-05 NOTE — PROGRESS NOTES
Jaylin Easley attended 3 hours of group today.     The group topic was Dual Diagnosis II, patient was responsive to topic.     Patient's engagement in the group session: medium     Total group size: 4      LINDA Garcia  1/27/2020, 1:02 PM
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
## 2021-06-05 NOTE — PROGRESS NOTES
Jaylin Easley attended 3 hours of group today.     The group topic was Dual Diagnosis I, patient was responsive to topic.     Patient's engagement in the group session: high     Total group size: 6      LINDA Garcia  1/22/2020, 1:31 PM

## 2021-06-05 NOTE — PROGRESS NOTES
Weekly Progress Note  Jaylin CHAD Kaushal  1970  [id number]      D) Pt attended 3 groups this week with 0 absences. Patient attended 0 individual sessions this week. A) Staff facilitated groups and reviewed tx progress. [facility] for [facility]. R) No VAP needed at this time.     Any significant events, defines as events that impact patients relationship with others inside and outside of treatment: No.     Indicate any changes or monitoring of physical or mental health problems No    Indicate involvement by any outside supports: No.    IAPP reviewed and modified as needed. NA  Pt working on the following dimensions:    Dimension #1 - Withdrawal Potential - Risk 0. No current concerns. Pt tested NEG for all substances on 1/3 and 1/8.    Specific goals from treatment plan addressed this week:  Patient to maintain abstinence throughout outpatient treatment.   Effectiveness of strategies:  Strategies appear to be effective at this time.     Dimension #2 - Biomedical - Risk 1. Pt reports the following biomedical conditions: psoriasis, psoriatic arthritis, and COPD.  Specific goals from treatment plan addressed this week:  Patient to maintain stable health throughout outpatient treatment.   Effectiveness of strategies:  Strategies appear to be effective.    Dimension #3 - Emotional/Behavioral/Cognitive - Risk 2. EMR shows current diagnoses of Bipolar disorder, current episode depressed, moderate; PTSD. Pt denies any current mental health symptoms/concerns though does note daily passive SI. Pt reports really appreciating her new sober house and wanting to be more engaged with the residents there. She also expressed sadness in group that she does not have a place to call her own home.   Active interventions to stabilize mental health symptoms:  Ppt denies any specific interventions at this time.   Specific goals from treatment plan addressed this week:  Establish and maintain mental and emotional health.  Effectiveness of  strategies:  Strategies appear to be effective.    Dimension #4 - Treatment Acceptance/Resistance - Risk 0. Pt is currently compliant in all program areas.  Specific goals from treatment plan addressed this week:  Patient to increase motivation towards recovery by participating in outpatient programming.   Effectiveness of strategies:  Strategies appear to be effective.    Dimension #5 - Relapse Potential - Risk 2. Pt denies chemical use over the past week. It remains unclear to what degree she understands addiction as a maladaptive coping pattern. She has agreed to attend 15 AA/NA meetings in 30 days and is open to enlisting an AA sponsor. She reports some passing thoughts around relapse over the past few days though stated she played the tape forward and determined it would not be worth it to relapse.     Specific goals from treatment plan addressed this week:  Develop and utilize practical, effective relapse-prevention strategies and tools.   Effectiveness of strategies:  Strategies appear to be effective.    Dimension #6 - Recovery Environment - Risk 3. Pt receives medication management through her PCP and Pt has decided to not schedule an appointment with her therapist George Mane, PhD, [facility] at [facility]. She is on probation in [facility] [place] through 2025/26. Pt reports having some friends who support her recovery. She reports having moved into a women's sober house in [place] and has resumed attending recovery support group meetings over the past week. She has agreed to attend 15 AA/NA meetings in 30 days.   Specific goals from treatment plan addressed this week:  Establish and engage a widespread, redundant recovery support network.   Effectiveness of strategies:  Strategies appear to be effective.    T) Treatment plan updated No.  Patient notified and in agreement NA.    Patient educated on Strengths this week. Patient has completed 46 of 115 program hours at this time.     Projected  discharge date is 5/15/19. Current discharge plan is PENDING.     DARCIE Garcia, [facility], [facility]  1/10/2020, 2:06 PM      Weekly Educational Topics Date   1. Understanding Dual Diagnoses, week 1    2. Understanding Dual Diagnoses, week 2    3. Stress Management    4. Feelings/Emotions    5. Thinking 11/27/19;    6. Building Recovery Support 12/2/19; 12/4/19;   7. Developing Assertive Communication Skills 12/9/19; 12/11/19; 12/13/19;   8. Relapse Prevention 12/16/19; 12/20/19;    9. Relationships/Boundaries 12/23/19; 12/27/19;   10. Grief and Loss 12/30/19; 1/3/20;    11. Strengths 1/6/20; 1/8/20; 1/10/20;    12. Wellness

## 2021-06-05 NOTE — PROGRESS NOTES
Jaylin Easley attended 3 hours of group today.     The group topic was Stress Management, patient was responsive to topic.     Patient's engagement in the group session: medium     Total group size: 6      LINDA Garcia  2/7/2020, 12:52 PM

## 2021-06-05 NOTE — PROGRESS NOTES
Jaylin Easley attended 3 hours of group today.     The group topic was Wellness, patient was responsive to topic.     Patient's engagement in the group session: medium     Total group size: 6      Sharyn Thornton  1/13/2020, 12:50 PM

## 2021-06-05 NOTE — PROGRESS NOTES
Weekly Progress Note  Jaylin BONILLA Kaushal  1970  [id number]      D) Pt attended 2 groups this week with 0 absences. Patient attended 0 individual sessions this week. A) Staff facilitated groups and reviewed tx progress. Assessed for [facility]. R) No VAP needed at this time.     Any significant events, defines as events that impact patients relationship with others inside and outside of treatment: No.     Indicate any changes or monitoring of physical or mental health problems No    Indicate involvement by any outside supports: No.    IAPP reviewed and modified as needed. NA  Pt working on the following dimensions:    Dimension #1 - Withdrawal Potential - Risk 0. No current concerns. Pt tested NEG for all substances on 2/3/20.    Specific goals from treatment plan addressed this week:  Patient to maintain abstinence throughout outpatient treatment.   Effectiveness of strategies:  Strategies appear to be effective at this time.     Dimension #2 - Biomedical - Risk 1. Pt reports the following biomedical conditions: psoriasis, psoriatic arthritis, and COPD.  Specific goals from treatment plan addressed this week:  Patient to maintain stable health throughout outpatient treatment.   Effectiveness of strategies:  Strategies appear to be effective.    Dimension #3 - Emotional/Behavioral/Cognitive - Risk 1. Pt reports feeling stable. She denies any outstanding MH concerns at this time. She notes feeling happy and joyful.   Active interventions to stabilize mental health symptoms:  Ppt denies any specific interventions at this time.   Specific goals from treatment plan addressed this week:  Establish and maintain mental and emotional health.  Effectiveness of strategies:  Strategies appear to be effective.    Dimension #4 - Treatment Acceptance/Resistance - Risk 0. Pt is currently compliant in all program areas.  Specific goals from treatment plan addressed this week:  Patient to increase motivation towards recovery by  participating in outpatient programming.   Effectiveness of strategies:  Strategies appear to be effective.    Dimension #5 - Relapse Potential - Risk 2. Pt denies chemical use over the past week.She reports attending several AA meetings. She has not yet enlisted an AA sponsor. She hopes to move out of her sober house in early March into a roommate situation with people she has lived with before and are supportive of her recovery. She has agreed to put into place an accountability plan to guard against her tendency to isolate.    Specific goals from treatment plan addressed this week:  Develop and utilize practical, effective relapse-prevention strategies and tools.   Effectiveness of strategies:  Strategies appear to be effective.    Dimension #6 - Recovery Environment - Risk 2. Pt receives medication management through her PCP. She is on probation in [facility] through 20/25/26. Pt reports having some friends who support her recovery. She continues to reside at a women's sober house in [place] and has resumed attending recovery support group meetings. She has agreed to attend 15 AA/NA meetings in 30 days.   Specific goals from treatment plan addressed this week:  Establish and engage a widespread, redundant recovery support network.   Effectiveness of strategies:  Strategies appear to be effective.    T) Treatment plan updated No.  Patient notified and in agreement NA.    Patient educated on Stress Management. Patient has completed 76 of 115 program hours at this time.     Projected discharge date is 5/15/19. Current discharge plan is PENDING.     Jignesh Clark, DARCIE, [facility], [facility]  2/7/2020, 1:17 PM      Weekly Educational Topics Date   1. Understanding Dual Diagnoses, week 1 1/20/20; 1/22/20; 1/24/20;   2. Understanding Dual Diagnoses, week 2 1/27/20; 1/31/20;    3. Stress Management 2/3/20; 2/7/20;    4. Feelings/Emotions    5. Thinking 11/27/19;    6. Building Recovery Support 12/2/19; 12/4/19;   7.  Developing Assertive Communication Skills 12/9/19; 12/11/19; 12/13/19;   8. Relapse Prevention 12/16/19; 12/20/19;    9. Relationships/Boundaries 12/23/19; 12/27/19;   10. Grief and Loss 12/30/19; 1/3/20;    11. Strengths 1/6/20; 1/8/20; 1/10/20;    12. Wellness 1/13/20; 1/15/20; 1/17/20;

## 2021-06-05 NOTE — PROGRESS NOTES
Jaylin Easley attended 3 hours of group today.     The group topic was Wellness, patient was responsive to topic.     Patient's engagement in the group session: high     Total group size: 6      LINDA Garcia  1/15/2020, 1:57 PM

## 2021-06-05 NOTE — PROGRESS NOTES
Jaylin Easley attended 3 hours of group today.     The group topic was Strengths, patient was responsive to topic.     Patient's engagement in the group session: medium     Total group size: 4      Sharyn Thornton  1/6/2020, 12:53 PM

## 2021-06-05 NOTE — PROGRESS NOTES
Weekly Progress Note  Jaylin L Kaushal  1970  [id number]      D) Pt attended 3 groups this week with 0 absences. Patient attended 0 individual sessions this week. A) Staff facilitated groups and reviewed tx progress. Assessed for [facility]. R) No VAP needed at this time.     Any significant events, defines as events that impact patients relationship with others inside and outside of treatment: No.     Indicate any changes or monitoring of physical or mental health problems No    Indicate involvement by any outside supports: No.    IAPP reviewed and modified as needed. NA  Pt working on the following dimensions:    Dimension #1 - Withdrawal Potential - Risk 0. No current concerns. Pt tested NEG for all substances on 1/3 and 1/8.    Specific goals from treatment plan addressed this week:  Patient to maintain abstinence throughout outpatient treatment.   Effectiveness of strategies:  Strategies appear to be effective at this time.     Dimension #2 - Biomedical - Risk 1. Pt reports the following biomedical conditions: psoriasis, psoriatic arthritis, and COPD.  Specific goals from treatment plan addressed this week:  Patient to maintain stable health throughout outpatient treatment.   Effectiveness of strategies:  Strategies appear to be effective.    Dimension #3 - Emotional/Behavioral/Cognitive - Risk 2.Pt reports feeling stable. She denies any outstanding MH concerns at this time.   Active interventions to stabilize mental health symptoms:  Ppt denies any specific interventions at this time.   Specific goals from treatment plan addressed this week:  Establish and maintain mental and emotional health.  Effectiveness of strategies:  Strategies appear to be effective.    Dimension #4 - Treatment Acceptance/Resistance - Risk 0. Pt is currently compliant in all program areas.  Specific goals from treatment plan addressed this week:  Patient to increase motivation towards recovery by participating in outpatient programming.    Effectiveness of strategies:  Strategies appear to be effective.    Dimension #5 - Relapse Potential - Risk 2. Pt denies chemical use over the past week. It remains unclear to what degree she understands addiction as a maladaptive coping pattern. She has agreed to attend 15 AA/NA meetings in 30 days and is open to enlisting an AA sponsor.     Specific goals from treatment plan addressed this week:  Develop and utilize practical, effective relapse-prevention strategies and tools.   Effectiveness of strategies:  Strategies appear to be effective.    Dimension #6 - Recovery Environment - Risk 2. Pt receives medication management through her PCP and Pt has decided to not schedule an appointment with her therapist George Mane, PhD, LP at [facility]. She is on probation in [facility] through 2025/26. Pt reports having some friends who support her recovery. She continues to reside at a women's sober house in [place] and has resumed attending recovery support group meetings. She has agreed to attend 15 AA/NA meetings in 30 days.   Specific goals from treatment plan addressed this week:  Establish and engage a widespread, redundant recovery support network.   Effectiveness of strategies:  Strategies appear to be effective.    T) Treatment plan updated No.  Patient notified and in agreement NA.    Patient educated on Wellness this week. Patient has completed 55 of 115 program hours at this time.     Projected discharge date is 5/15/19. Current discharge plan is PENDING.     DARCIE Garcia, [facility], [facility]  1/17/2020, 2:27 PM      Weekly Educational Topics Date   1. Understanding Dual Diagnoses, week 1    2. Understanding Dual Diagnoses, week 2    3. Stress Management    4. Feelings/Emotions    5. Thinking 11/27/19;    6. Building Recovery Support 12/2/19; 12/4/19;   7. Developing Assertive Communication Skills 12/9/19; 12/11/19; 12/13/19;   8. Relapse Prevention 12/16/19; 12/20/19;    9.  Relationships/Boundaries 12/23/19; 12/27/19;   10. Grief and Loss 12/30/19; 1/3/20;    11. Strengths 1/6/20; 1/8/20; 1/10/20;    12. Wellness 1/13/20; 1/15/20; 1/17/20;

## 2021-06-05 NOTE — PROGRESS NOTES
Jaylin Easley attended 3 hours of group today.     The group topic was Dual Diagnosis II, patient was responsive to topic.     Patient's engagement in the group session: high     Total group size: 6      LINDA Garcia  1/31/2020, 1:14 PM

## 2021-06-05 NOTE — PROGRESS NOTES
Jaylin Easley attended 3 hours of group today.     The group topic was Dual Diagnosis I, patient was responsive to topic.     Patient's engagement in the group session: medium     Total group size: 4      LINDA Garcia  1/20/2020, 1:11 PM

## 2021-06-05 NOTE — PROGRESS NOTES
Weekly Progress Note  Jaylin L Kaushal  1970  [id number]      D) Pt attended 2 groups this week with 0 absences. Patient attended 0 individual sessions this week. A) Staff facilitated groups and reviewed tx progress. Assessed for [facility]. R) No VAP needed at this time.     Any significant events, defines as events that impact patients relationship with others inside and outside of treatment: No.     Indicate any changes or monitoring of physical or mental health problems No    Indicate involvement by any outside supports: No.    IAPP reviewed and modified as needed. NA  Pt working on the following dimensions:    Dimension #1 - Withdrawal Potential - Risk 0. No current concerns. Pt tested NEG for all substances on 1/24/20..    Specific goals from treatment plan addressed this week:  Patient to maintain abstinence throughout outpatient treatment.   Effectiveness of strategies:  Strategies appear to be effective at this time.     Dimension #2 - Biomedical - Risk 1. Pt reports the following biomedical conditions: psoriasis, psoriatic arthritis, and COPD.  Specific goals from treatment plan addressed this week:  Patient to maintain stable health throughout outpatient treatment.   Effectiveness of strategies:  Strategies appear to be effective.    Dimension #3 - Emotional/Behavioral/Cognitive - Risk 1. Pt reports feeling stable. She denies any outstanding MH concerns at this time.   Active interventions to stabilize mental health symptoms:  Ppt denies any specific interventions at this time.   Specific goals from treatment plan addressed this week:  Establish and maintain mental and emotional health.  Effectiveness of strategies:  Strategies appear to be effective.    Dimension #4 - Treatment Acceptance/Resistance - Risk 0. Pt is currently compliant in all program areas.  Specific goals from treatment plan addressed this week:  Patient to increase motivation towards recovery by participating in outpatient programming.    Effectiveness of strategies:  Strategies appear to be effective.    Dimension #5 - Relapse Potential - Risk 2. Pt denies chemical use over the past week.She reports attending several AA meetings. She has not yet enlisted an AA sponsor.     Specific goals from treatment plan addressed this week:  Develop and utilize practical, effective relapse-prevention strategies and tools.   Effectiveness of strategies:  Strategies appear to be effective.    Dimension #6 - Recovery Environment - Risk 2. Pt receives medication management through her PCP. She is on probation in [facility] through 2025/26. Pt reports having some friends who support her recovery. She continues to reside at a women's sober house in [place] and has resumed attending recovery support group meetings. She has agreed to attend 15 AA/NA meetings in 30 days.   Specific goals from treatment plan addressed this week:  Establish and engage a widespread, redundant recovery support network.   Effectiveness of strategies:  Strategies appear to be effective.    T) Treatment plan updated No.  Patient notified and in agreement NA.    Patient educated on Understanding Dual Diagnoses Wk2. Patient has completed 70 of 115 program hours at this time.     Projected discharge date is 5/15/19. Current discharge plan is PENDING.     Jignesh Clark, DARCIE, [facility], [facility]  1/31/2020, 2:12 PM      Weekly Educational Topics Date   1. Understanding Dual Diagnoses, week 1 1/20/20; 1/22/20; 1/24/20;   2. Understanding Dual Diagnoses, week 2 1/27/20; 1/31/20;    3. Stress Management    4. Feelings/Emotions    5. Thinking 11/27/19;    6. Building Recovery Support 12/2/19; 12/4/19;   7. Developing Assertive Communication Skills 12/9/19; 12/11/19; 12/13/19;   8. Relapse Prevention 12/16/19; 12/20/19;    9. Relationships/Boundaries 12/23/19; 12/27/19;   10. Grief and Loss 12/30/19; 1/3/20;    11. Strengths 1/6/20; 1/8/20; 1/10/20;    12. Wellness 1/13/20; 1/15/20; 1/17/20;

## 2021-06-05 NOTE — PROGRESS NOTES
Jaylin Easley attended 3 hours of group today.     The group topic was Strengths, patient was responsive to topic.     Patient's engagement in the group session: medium     Total group size: 4      LINDA Garcia  1/10/2020, 1:07 PM

## 2021-06-05 NOTE — PROGRESS NOTES
Weekly Progress Note  Jaylin L Kaushal  1970  [id number]      D) Pt attended 3 groups this week with 0 absences. Patient attended 0 individual sessions this week. A) Staff facilitated groups and reviewed tx progress. Assessed for [facility]. R) No VAP needed at this time.     Any significant events, defines as events that impact patients relationship with others inside and outside of treatment: No.     Indicate any changes or monitoring of physical or mental health problems No    Indicate involvement by any outside supports: No.    IAPP reviewed and modified as needed. NA  Pt working on the following dimensions:    Dimension #1 - Withdrawal Potential - Risk 0. No current concerns. Pt tested NEG for all substances on 1/24/20..    Specific goals from treatment plan addressed this week:  Patient to maintain abstinence throughout outpatient treatment.   Effectiveness of strategies:  Strategies appear to be effective at this time.     Dimension #2 - Biomedical - Risk 1. Pt reports the following biomedical conditions: psoriasis, psoriatic arthritis, and COPD.  Specific goals from treatment plan addressed this week:  Patient to maintain stable health throughout outpatient treatment.   Effectiveness of strategies:  Strategies appear to be effective.    Dimension #3 - Emotional/Behavioral/Cognitive - Risk 1. Pt reports feeling stable. She denies any outstanding MH concerns at this time.   Active interventions to stabilize mental health symptoms:  Ppt denies any specific interventions at this time.   Specific goals from treatment plan addressed this week:  Establish and maintain mental and emotional health.  Effectiveness of strategies:  Strategies appear to be effective.    Dimension #4 - Treatment Acceptance/Resistance - Risk 0. Pt is currently compliant in all program areas.  Specific goals from treatment plan addressed this week:  Patient to increase motivation towards recovery by participating in outpatient programming.    Effectiveness of strategies:  Strategies appear to be effective.    Dimension #5 - Relapse Potential - Risk 2. Pt denies chemical use over the past week. It remains unclear to what degree she understands addiction as a maladaptive coping pattern. She has agreed to attend 15 AA/NA meetings in 30 days and is open to enlisting an AA sponsor.     Specific goals from treatment plan addressed this week:  Develop and utilize practical, effective relapse-prevention strategies and tools.   Effectiveness of strategies:  Strategies appear to be effective.    Dimension #6 - Recovery Environment - Risk 2. Pt receives medication management through her PCP and Pt has decided to not schedule an appointment with her therapist George Mane, PhD, LP at [facility]. She is on probation in [facility] through 2025/26. Pt reports having some friends who support her recovery. She continues to reside at a women's sober house in [place] and has resumed attending recovery support group meetings. She has agreed to attend 15 AA/NA meetings in 30 days.   Specific goals from treatment plan addressed this week:  Establish and engage a widespread, redundant recovery support network.   Effectiveness of strategies:  Strategies appear to be effective.    T) Treatment plan updated No.  Patient notified and in agreement NA.    Patient educated on Understanding Dual Diagnoses Wk1. Patient has completed 64 of 115 program hours at this time.     Projected discharge date is 5/15/19. Current discharge plan is PENDING.     DARCIE Garcia, [facility], [facility]  1/24/2020, 2:14 PM      Weekly Educational Topics Date   1. Understanding Dual Diagnoses, week 1 1/20/20; 1/22/20; 1/24/20;   2. Understanding Dual Diagnoses, week 2    3. Stress Management    4. Feelings/Emotions    5. Thinking 11/27/19;    6. Building Recovery Support 12/2/19; 12/4/19;   7. Developing Assertive Communication Skills 12/9/19; 12/11/19; 12/13/19;   8. Relapse Prevention  12/16/19; 12/20/19;    9. Relationships/Boundaries 12/23/19; 12/27/19;   10. Grief and Loss 12/30/19; 1/3/20;    11. Strengths 1/6/20; 1/8/20; 1/10/20;    12. Wellness 1/13/20; 1/15/20; 1/17/20;

## 2021-06-05 NOTE — PROGRESS NOTES
Jaylin Easley attended 3 hours of group today.     The group topic was Wellness, patient was responsive to topic.     Patient's engagement in the group session: medium     Total group size: 6      LINDA Garcia  1/17/2020, 12:54 PM

## 2021-06-05 NOTE — PROGRESS NOTES
Jaylin Easley attended 3 hours of group today.     The group topic was Stress Management, patient was responsive to topic.     Patient's engagement in the group session: low     Total group size: 6      LINDA Garcia  2/3/2020, 12:35 PM

## 2021-06-05 NOTE — PROGRESS NOTES
Jaylin Easley attended 3 hours of group today.     The group topic was Dual Diagnosis I, patient was responsive to topic.     Patient's engagement in the group session: high     Total group size: 6      LINDA Garcia  1/24/2020, 1:00 PM

## 2021-06-06 NOTE — PROGRESS NOTES
D: Pt arrived late for an individual counseling session. Session lasted 20 minutes.    A: Pt reports continuing abstinence from mood-altering substances, as confirmed from several weeks of negative UA screenings. She denies cravings. We discussed her eventual transition from living in a sober house to moving to a friend's house with three roommates, and she is prepared to begin active accountability through twice daily engagement with someone(s) from her recovery support network. Pt also agreed to return to her sober house if her meeting attendance tapers off. She notes feeling very good, particularly around the boundary she'd established by blocking to telephone number of an ex-boyfriend whom she had been using methamphetamine with. She is deferring looking for work at this time.     R: Pt appears to be responding well to IOP treatment. She continues to presents as active and engaged in her recovery support community.     T: Pt will continue IOP Phase III through 2/28, after which she will transition to Phase III, attending once per week on Tuesdays for approximately 12 weeks.

## 2021-06-06 NOTE — PROGRESS NOTES
Pt presented for group on time. On the travel screener she indicated experiencing a cough and was excused from programming. Counselor will connect with her to establish telephonic care moving forward.

## 2021-06-06 NOTE — PROGRESS NOTES
Weekly Progress Note  Jaylin BONILLA Kaushal  1970  [id number]      D) Pt attended 1 group this week with 0 absences. Patient attended 0 individual sessions this week. A) Staff facilitated groups and reviewed tx progress. [facility] for [facility]. R) No VAP needed at this time.     Any significant events, defines as events that impact patients relationship with others inside and outside of treatment: No.     Indicate any changes or monitoring of physical or mental health problems No    Indicate involvement by any outside supports: No.    IAPP reviewed and modified as needed. NA  Pt working on the following dimensions:    Dimension #1 - Withdrawal Potential - Risk 0. No current concerns. Pt tested NEG for all substances on 2/3/20.    Specific goals from treatment plan addressed this week:  Patient to maintain abstinence throughout outpatient treatment.   Effectiveness of strategies:  Strategies appear to be effective at this time.     Dimension #2 - Biomedical - Risk 1. Pt reports the following biomedical conditions: psoriasis, psoriatic arthritis, and COPD.  Specific goals from treatment plan addressed this week:  Patient to maintain stable health throughout outpatient treatment.   Effectiveness of strategies:  Strategies appear to be effective.    Dimension #3 - Emotional/Behavioral/Cognitive - Risk 1. Pt reports feeling stable. She denies any outstanding MH concerns at this time. She notes feeling happy and joyful.   Active interventions to stabilize mental health symptoms:  Ppt denies any specific interventions at this time.   Specific goals from treatment plan addressed this week:  Establish and maintain mental and emotional health.  Effectiveness of strategies:  Strategies appear to be effective.    Dimension #4 - Treatment Acceptance/Resistance - Risk 1. Pt is currently compliant in all program areas.    Specific goals from treatment plan addressed this week:  Patient to increase motivation towards recovery by  participating in outpatient programming.   Effectiveness of strategies:  Strategies appear to be effective.    Dimension #5 - Relapse Potential - Risk 2. Pt denies chemical use over the past week.She reports attending several AA meetings. She has not yet enlisted an AA sponsor but has agreed to do so. She has moved out of her sober house into a roommate situation with people she has lived with before and are supportive of her recovery. She has not begun twice-daily contact with a recovery peer to check-in though reports continuing to attend meetings.      Specific goals from treatment plan addressed this week:  Develop and utilize practical, effective relapse-prevention strategies and tools.   Effectiveness of strategies:  Strategies appear to be effective.    Dimension #6 - Recovery Environment - Risk 2. Pt receives medication management through her PCP. She is on probation in [facility] through 2/25/26. Pt reports having some friends who support her recovery. She has moved out of her sober house into a roommate situation with people she has lived with before and are supportive of her recovery. She has not begun twice-daily contact with a recovery peer to check-in though reports continuing to attend meetings.    .   Specific goals from treatment plan addressed this week:  Establish and engage a widespread, redundant recovery support network.   Effectiveness of strategies:  Strategies appear to be effective.    T) Treatment plan updated No.  Patient notified and in agreement NA.    Patient educated on Assertive Communication. Patient has completed 96 of 115 program hours at this time.     Projected discharge date is 5/15/19. Current discharge plan is PENDING.     DARCIE Garcia, [facility], [facility]  3/6/2020, 2:12 PM      Weekly Educational Topics Date   1. Understanding Dual Diagnoses, week 1 1/20/20; 1/22/20; 1/24/20;   2. Understanding Dual Diagnoses, week 2 1/27/20; 1/31/20;    3. Stress Management 2/3/20;  2/7/20;    4. Feelings/Emotions 2/10/20; 2/14/20;    5. Thinking 11/27/19; 2/17/20; 2/21/20;    6. Building Recovery Support 12/2/19; 12/4/19; 2/24/20; 2/28/20;    7. Developing Assertive Communication Skills 12/9/19; 12/11/19; 12/13/19; 3/3/20;    8. Relapse Prevention 12/16/19; 12/20/19;    9. Relationships/Boundaries 12/23/19; 12/27/19;   10. Grief and Loss 12/30/19; 1/3/20;    11. Strengths 1/6/20; 1/8/20; 1/10/20;    12. Wellness 1/13/20; 1/15/20; 1/17/20;

## 2021-06-06 NOTE — PROGRESS NOTES
Jaylin Easley attended 3 hours of group today.     The group topic was Recovery Support, patient was responsive to topic.     Patient's engagement in the group session: medium     Total group size: 8      LINDA Garcia  2/24/2020, 1:09 PM

## 2021-06-06 NOTE — PROGRESS NOTES
Jaylin Easley attended 3 hours of group today.     The group topic was Recovery Support, patient was responsive to topic.     Patient's engagement in the group session: medium     Total group size: 7      LINDA Cervantes, [facility]  2/28/2020, 11:43 AM

## 2021-06-06 NOTE — PROGRESS NOTES
Jaylin Easley attended 2 hours of group today.     The group topic was Communication Skills, patient was responsive to topic.     Patient's engagement in the group session: medium     Total group size: 4    Pt attended her first IOP Phase III group today.     Jignesh Clark [facility], [facility]  3/3/2020, 12:32 PM

## 2021-06-06 NOTE — PROGRESS NOTES
Jaylin Easley attended 3 hours of group today.     The group topic was Feelings/Emotions, patient was responsive to topic.     Patient's engagement in the group session: medium     Total group size: 7      LINDA Garcia  2/14/2020, 12:34 PM

## 2021-06-06 NOTE — PROGRESS NOTES
Weekly Progress Note  Jaylin L Kaushal  1970  [id number]      D) Pt attended 1 group this week with 0 absences. Patient attended 0 individual sessions this week. A) Staff facilitated groups and reviewed tx progress. [facility] for [facility]. R) No VAP needed at this time.     Any significant events, defines as events that impact patients relationship with others inside and outside of treatment: No.     Indicate any changes or monitoring of physical or mental health problems No    Indicate involvement by any outside supports: No.    IAPP reviewed and modified as needed. NA  Pt working on the following dimensions:    Dimension #1 - Withdrawal Potential - Risk 0. No current concerns. Pt tested NEG for all substances on 2/10/20.    Specific goals from treatment plan addressed this week:  Patient to maintain abstinence throughout outpatient treatment.   Effectiveness of strategies:  Strategies appear to be effective at this time.     Dimension #2 - Biomedical - Risk 1. Pt reports the following biomedical conditions: psoriasis, psoriatic arthritis, and COPD.  Specific goals from treatment plan addressed this week:  Patient to maintain stable health throughout outpatient treatment.   Effectiveness of strategies:  Strategies appear to be effective.    Dimension #3 - Emotional/Behavioral/Cognitive - Risk 1. Pt reports feeling stable. She denies any outstanding MH concerns at this time. She continues to report feeling happy and joyful.   Active interventions to stabilize mental health symptoms:  Ppt denies any specific interventions at this time.   Specific goals from treatment plan addressed this week:  Establish and maintain mental and emotional health.  Effectiveness of strategies:  Strategies appear to be effective.    Dimension #4 - Treatment Acceptance/Resistance - Risk 1. Pt is currently compliant in all program areas.    Specific goals from treatment plan addressed this week:  Patient to increase motivation towards  recovery by participating in outpatient programming.   Effectiveness of strategies:  Strategies appear to be effective.    Dimension #5 - Relapse Potential - Risk 2. Pt denies chemical use over the past week.She reports attending several AA meetings. She has not yet enlisted an AA sponsor but has agreed to do so. Pt reports experiencing significant cravings for methamphetamine yesterday which she attributed to having been around others who were using on 3/7. She reported reaching out to her support network, including attending an AA meeting and getting support from others there. She counts this experience as a significant victory.       Specific goals from treatment plan addressed this week:  Develop and utilize practical, effective relapse-prevention strategies and tools.   Effectiveness of strategies:  Strategies appear to be effective.    Dimension #6 - Recovery Environment - Risk 2. Pt receives medication management through her PCP. She is on probation in [facility] through 2/25/26. Pt reports having some friends who support her recovery. She has moved out of her sober house into a roommate situation with people she has lived with before and are supportive of her recovery. She has not yet enlisted an AA sponsor but has agreed to do so.   Specific goals from treatment plan addressed this week:  Establish and engage a widespread, redundant recovery support network.   Effectiveness of strategies:  Strategies appear to be effective.    T) Treatment plan updated No.  Patient notified and in agreement NA.    Patient educated on Relapse Prevention. Patient has completed 98 of 115 program hours at this time.     Projected discharge date is 5/15/19. Current discharge plan is PENDING.     DARCIE Garcia, [facility], [facility]  3/13/2020, 11:37 AM      Weekly Educational Topics Date   1. Understanding Dual Diagnoses, week 1 1/20/20; 1/22/20; 1/24/20;   2. Understanding Dual Diagnoses, week 2 1/27/20; 1/31/20;    3. Stress  Management 2/3/20; 2/7/20;    4. Feelings/Emotions 2/10/20; 2/14/20;    5. Thinking 11/27/19; 2/17/20; 2/21/20;    6. Building Recovery Support 12/2/19; 12/4/19; 2/24/20; 2/28/20;    7. Developing Assertive Communication Skills 12/9/19; 12/11/19; 12/13/19; 3/3/20;    8. Relapse Prevention 12/16/19; 12/20/19; 3/10/20;   9. Relationships/Boundaries 12/23/19; 12/27/19;   10. Grief and Loss 12/30/19; 1/3/20;    11. Strengths 1/6/20; 1/8/20; 1/10/20;    12. Wellness 1/13/20; 1/15/20; 1/17/20;

## 2021-06-06 NOTE — PROGRESS NOTES
Weekly Progress Note  Jaylin BONILLA Kaushal  1970  [id number]      D) Pt attended 2 groups this week with 0 absences. Patient attended 0 individual sessions this week. A) Staff facilitated groups and reviewed tx progress. [facility] for [facility]. R) No VAP needed at this time.     Any significant events, defines as events that impact patients relationship with others inside and outside of treatment: No.     Indicate any changes or monitoring of physical or mental health problems No    Indicate involvement by any outside supports: No.    IAPP reviewed and modified as needed. NA  Pt working on the following dimensions:    Dimension #1 - Withdrawal Potential - Risk 0. No current concerns. Pt tested NEG for all substances on 2/3/20.    Specific goals from treatment plan addressed this week:  Patient to maintain abstinence throughout outpatient treatment.   Effectiveness of strategies:  Strategies appear to be effective at this time.     Dimension #2 - Biomedical - Risk 1. Pt reports the following biomedical conditions: psoriasis, psoriatic arthritis, and COPD.  Specific goals from treatment plan addressed this week:  Patient to maintain stable health throughout outpatient treatment.   Effectiveness of strategies:  Strategies appear to be effective.    Dimension #3 - Emotional/Behavioral/Cognitive - Risk 1. Pt reports feeling stable. She denies any outstanding MH concerns at this time. She notes feeling happy and joyful.   Active interventions to stabilize mental health symptoms:  Ppt denies any specific interventions at this time.   Specific goals from treatment plan addressed this week:  Establish and maintain mental and emotional health.  Effectiveness of strategies:  Strategies appear to be effective.    Dimension #4 - Treatment Acceptance/Resistance - Risk 0. Pt is currently compliant in all program areas. She was late for a scheduled individual counseling session on 2/14.   Specific goals from treatment plan addressed  this week:  Patient to increase motivation towards recovery by participating in outpatient programming.   Effectiveness of strategies:  Strategies appear to be effective.    Dimension #5 - Relapse Potential - Risk 2. Pt denies chemical use over the past week.She reports attending several AA meetings. She has not yet enlisted an AA sponsor but has agreed to do so. She plans to move out of her sober house in early March into a roommate situation with people she has lived with before and are supportive of her recovery. She and counselor determined specifics of an accountability plan, which includes twice-daily contact with a recovery peer to check-in, and a commitment from Pt to move back into the sober house if her meeting attendance declines.     Specific goals from treatment plan addressed this week:  Develop and utilize practical, effective relapse-prevention strategies and tools.   Effectiveness of strategies:  Strategies appear to be effective.    Dimension #6 - Recovery Environment - Risk 2. Pt receives medication management through her PCP. She is on probation in [facility] through 2/25/26. Pt reports having some friends who support her recovery. She continues to reside at a women's sober house in [place] and has resumed attending recovery support group meetings. .   Specific goals from treatment plan addressed this week:  Establish and engage a widespread, redundant recovery support network.   Effectiveness of strategies:  Strategies appear to be effective.    T) Treatment plan updated No.  Patient notified and in agreement NA.    Patient educated on Thinking Patient has completed 88 of 115 program hours at this time.     Projected discharge date is 5/15/19. Current discharge plan is PENDING.     Jignesh Clark, DARCIE, [facility], [facility]  2/21/2020, 1:25 PM      Weekly Educational Topics Date   1. Understanding Dual Diagnoses, week 1 1/20/20; 1/22/20; 1/24/20;   2. Understanding Dual Diagnoses, week 2  1/27/20; 1/31/20;    3. Stress Management 2/3/20; 2/7/20;    4. Feelings/Emotions 2/10/20; 2/14/20;    5. Thinking 11/27/19; 2/17/20; 2/21/20;    6. Building Recovery Support 12/2/19; 12/4/19;   7. Developing Assertive Communication Skills 12/9/19; 12/11/19; 12/13/19;   8. Relapse Prevention 12/16/19; 12/20/19;    9. Relationships/Boundaries 12/23/19; 12/27/19;   10. Grief and Loss 12/30/19; 1/3/20;    11. Strengths 1/6/20; 1/8/20; 1/10/20;    12. Wellness 1/13/20; 1/15/20; 1/17/20;

## 2021-06-06 NOTE — PROGRESS NOTES
Weekly Progress Note  Jaylin L Kaushal  1970  [id number]      D) Pt attended 2 groups this week with 0 absences. Patient attended 1 individual session this week. A) Staff facilitated groups and reviewed tx progress. Assessed for [facility]. R) No VAP needed at this time.     Any significant events, defines as events that impact patients relationship with others inside and outside of treatment: No.     Indicate any changes or monitoring of physical or mental health problems No    Indicate involvement by any outside supports: No.    IAPP reviewed and modified as needed. NA  Pt working on the following dimensions:    Dimension #1 - Withdrawal Potential - Risk 0. No current concerns. Pt tested NEG for all substances on 2/3/20.    Specific goals from treatment plan addressed this week:  Patient to maintain abstinence throughout outpatient treatment.   Effectiveness of strategies:  Strategies appear to be effective at this time.     Dimension #2 - Biomedical - Risk 1. Pt reports the following biomedical conditions: psoriasis, psoriatic arthritis, and COPD.  Specific goals from treatment plan addressed this week:  Patient to maintain stable health throughout outpatient treatment.   Effectiveness of strategies:  Strategies appear to be effective.    Dimension #3 - Emotional/Behavioral/Cognitive - Risk 1. Pt reports feeling stable. She denies any outstanding MH concerns at this time. She notes feeling happy and joyful.   Active interventions to stabilize mental health symptoms:  Ppt denies any specific interventions at this time.   Specific goals from treatment plan addressed this week:  Establish and maintain mental and emotional health.  Effectiveness of strategies:  Strategies appear to be effective.    Dimension #4 - Treatment Acceptance/Resistance - Risk 0. Pt is currently compliant in all program areas. She was late for a scheduled individual counseling session on 2/14.   Specific goals from treatment plan addressed  this week:  Patient to increase motivation towards recovery by participating in outpatient programming.   Effectiveness of strategies:  Strategies appear to be effective.    Dimension #5 - Relapse Potential - Risk 2. Pt denies chemical use over the past week.She reports attending several AA meetings. She has not yet enlisted an AA sponsor. She hopes to move out of her sober house in early March into a roommate situation with people she has lived with before and are supportive of her recovery. She and counselor determined specifics of an accountability plan, which includes twice-daily contact with a recovery peer to check-in, and a commitment from Pt to move back into the sober house if her meeting attendance declines.     Specific goals from treatment plan addressed this week:  Develop and utilize practical, effective relapse-prevention strategies and tools.   Effectiveness of strategies:  Strategies appear to be effective.    Dimension #6 - Recovery Environment - Risk 2. Pt receives medication management through her PCP. She is on probation in [facility] through 2/25/26. Pt reports having some friends who support her recovery. She continues to reside at a women's sober house in [place] and has resumed attending recovery support group meetings. She continues to attend 15 AA/NA meetings in 30 days.   Specific goals from treatment plan addressed this week:  Establish and engage a widespread, redundant recovery support network.   Effectiveness of strategies:  Strategies appear to be effective.    T) Treatment plan updated No.  Patient notified and in agreement NA.    Patient educated on Feelings/Emotions Patient has completed 82 of 115 program hours at this time.     Projected discharge date is 5/15/19. Current discharge plan is PENDING.     Jignesh Clark, DARCIE, [facility], [facility]  2/14/2020, 1:21 PM      Weekly Educational Topics Date   1. Understanding Dual Diagnoses, week 1 1/20/20; 1/22/20; 1/24/20;   2.  Understanding Dual Diagnoses, week 2 1/27/20; 1/31/20;    3. Stress Management 2/3/20; 2/7/20;    4. Feelings/Emotions 2/10/20; 2/14/20;    5. Thinking 11/27/19;    6. Building Recovery Support 12/2/19; 12/4/19;   7. Developing Assertive Communication Skills 12/9/19; 12/11/19; 12/13/19;   8. Relapse Prevention 12/16/19; 12/20/19;    9. Relationships/Boundaries 12/23/19; 12/27/19;   10. Grief and Loss 12/30/19; 1/3/20;    11. Strengths 1/6/20; 1/8/20; 1/10/20;    12. Wellness 1/13/20; 1/15/20; 1/17/20;

## 2021-06-06 NOTE — PROGRESS NOTES
Jaylin Easley attended 3 hours of group today.     The group topic was Thinking, patient was responsive to topic.     Patient's engagement in the group session: medium     Total group size: 7      LINDA Garcia  2/17/2020, 1:07 PM
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
## 2021-06-06 NOTE — PROGRESS NOTES
Jaylin Easley attended 3 hours of group today.     The group topic was Thinking, patient was responsive to topic.     Patient's engagement in the group session: medium     Total group size: 7      LINDA Garcia  2/21/2020, 12:42 PM

## 2021-06-06 NOTE — PROGRESS NOTES
Jaylin Easley attended 2 hours of group today.     The group topic was Relapse Prevention, patient was responsive to topic.     Patient's engagement in the group session: medium     Total group size: 3    Pt reports experiencing significant cravings for methamphetamine yesterday which she attributed to having been around others who were using on 3/7. She reported reaching out to her support network, including attending an AA meeting and getting support from others there. She counts this experience as a significant victory.     Jignesh Clark [facility], [facility]  3/10/2020, 12:19 PM

## 2021-06-06 NOTE — PROGRESS NOTES
Weekly Progress Note  Jaylin L Kaushal  1970  [id number]      D) Pt attended 2 groups this week with 0 absences. Patient attended 0 individual sessions this week. A) Staff facilitated groups and reviewed tx progress. Assessed for [facility]. R) No VAP needed at this time.     Any significant events, defines as events that impact patients relationship with others inside and outside of treatment: No.     Indicate any changes or monitoring of physical or mental health problems No    Indicate involvement by any outside supports: No.    IAPP reviewed and modified as needed. NA  Pt working on the following dimensions:    Dimension #1 - Withdrawal Potential - Risk 0. No current concerns. Pt tested NEG for all substances on 2/3/20.    Specific goals from treatment plan addressed this week:  Patient to maintain abstinence throughout outpatient treatment.   Effectiveness of strategies:  Strategies appear to be effective at this time.     Dimension #2 - Biomedical - Risk 1. Pt reports the following biomedical conditions: psoriasis, psoriatic arthritis, and COPD.  Specific goals from treatment plan addressed this week:  Patient to maintain stable health throughout outpatient treatment.   Effectiveness of strategies:  Strategies appear to be effective.    Dimension #3 - Emotional/Behavioral/Cognitive - Risk 1. Pt reports feeling stable. She denies any outstanding MH concerns at this time. She notes feeling happy and joyful.   Active interventions to stabilize mental health symptoms:  Ppt denies any specific interventions at this time.   Specific goals from treatment plan addressed this week:  Establish and maintain mental and emotional health.  Effectiveness of strategies:  Strategies appear to be effective.    Dimension #4 - Treatment Acceptance/Resistance - Risk 1. Pt is currently compliant in all program areas. She was late for a scheduled individual counseling session on 2/14.   Specific goals from treatment plan addressed  this week:  Patient to increase motivation towards recovery by participating in outpatient programming.   Effectiveness of strategies:  Strategies appear to be effective.    Dimension #5 - Relapse Potential - Risk 2. Pt denies chemical use over the past week.She reports attending several AA meetings. She has not yet enlisted an AA sponsor but has agreed to do so. She plans to move out of her sober house in early March into a roommate situation with people she has lived with before and are supportive of her recovery. She and counselor determined specifics of an accountability plan, which includes twice-daily contact with a recovery peer to check-in, and a commitment from Pt to move back into the sober house if her meeting attendance declines.     Specific goals from treatment plan addressed this week:  Develop and utilize practical, effective relapse-prevention strategies and tools.   Effectiveness of strategies:  Strategies appear to be effective.    Dimension #6 - Recovery Environment - Risk 2. Pt receives medication management through her PCP. She is on probation in [facility] through 2/25/26. Pt reports having some friends who support her recovery. She continues to reside at a women's sober house in [place] and has resumed attending recovery support group meetings. .   Specific goals from treatment plan addressed this week:  Establish and engage a widespread, redundant recovery support network.   Effectiveness of strategies:  Strategies appear to be effective.    T) Treatment plan updated Yes.  Patient notified and in agreement Yes.    Patient educated on Building Recovery Support. Patient has completed 94 of 115 program hours at this time.     Projected discharge date is 5/15/19. Current discharge plan is PENDING.     Jignesh Clark, DARCIE, LICSW, LADC  2/28/2020, 3:04 PM      Weekly Educational Topics Date   1. Understanding Dual Diagnoses, week 1 1/20/20; 1/22/20; 1/24/20;   2. Understanding Dual  Diagnoses, week 2 1/27/20; 1/31/20;    3. Stress Management 2/3/20; 2/7/20;    4. Feelings/Emotions 2/10/20; 2/14/20;    5. Thinking 11/27/19; 2/17/20; 2/21/20;    6. Building Recovery Support 12/2/19; 12/4/19; 2/24/20; 2/28/20;    7. Developing Assertive Communication Skills 12/9/19; 12/11/19; 12/13/19;   8. Relapse Prevention 12/16/19; 12/20/19;    9. Relationships/Boundaries 12/23/19; 12/27/19;   10. Grief and Loss 12/30/19; 1/3/20;    11. Strengths 1/6/20; 1/8/20; 1/10/20;    12. Wellness 1/13/20; 1/15/20; 1/17/20;

## 2021-06-07 NOTE — PROGRESS NOTES
Individual Phone Check-in Note    Jaylin Easley is a 50 y.o. female who is being evaluated via telephone visit.      Counselor and patient spoke via phone for 30 minutes to discuss treatment progress.     Call Notes: Counselor contacted patient for continuity of care during suspension of in person services. Pt reports continuing abstinence from all mood-altering substances. She notes some passing thoughts around use. She reports sporadic recovery reading. She reports medication-compliance and notes some mild depression since beginning to sequester herself over the past several days. She is continuing to live in her new roommate situation and reports it is going well. She notes still not having a mattress and this taking a toll on her physical well-being. She has had some limited contact with people from her support network. She agreed to start a text chain with at least 2 women she knows from recovery and also agreed to initiate contact at least 2x/daily with someone she knows in order to avoid isolation. She repots contact with the father of one of her adult son's and reports strong communication with him.     Patient treatment was reviewed. Changes were not made. Patient was actively and directly involved in the treatment plan review and updates. I have reviewed the note as documented above.  This accurately captures the substance of my conversation with the patient.      Call Started at: 1:30 PM  Call Ended at: 2:00 PM    Patient's engagement in the telephone visit: high     Supervising MD: Dr. Vanessa Clark, [facility], [facility]  3/19/2020, 3:16 PM
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
## 2021-06-07 NOTE — PROGRESS NOTES
Telemedicine Visit: The patient's condition can be safely assessed and treated via synchronous audio and visual telemedicine encounter.      Reason for Telemedicine Visit: Services only offered telehealth    Originating Site (Patient Location): Patient's home    Distant Site (Provider Location): Provider Remote Setting    Consent:  The patient/guardian has verbally consented to: the potential risks and benefits of telemedicine (video visit) versus in person care; bill my insurance or make self-payment for services provided; and responsibility for payment of non-covered services.     Mode of Communication:  Video Conference via [employer]    Call Started at: 10:00 AM  Call Ended at: 12:00 PM    As the provider I attest to compliance with applicable laws and regulations related to telemedicine.

## 2021-06-07 NOTE — PROGRESS NOTES
Weekly Progress Note     Week of 4/6/20-4/10/20  Jaylin Easley  1970  [id number]      D) Pt attended 1 group via [employer] Video this week with 0 absences. Patient participated in 1 individual counseling session via [employer] Video this week. A) Staff facilitated groups and reviewed tx progress. Assessed for [facility]. R) No VAP needed at this time.     Any significant events, defines as events that impact patients relationship with others inside and outside of treatment: No.     Indicate any changes or monitoring of physical or mental health problems No    Indicate involvement by any outside supports: No.    IAPP reviewed and modified as needed. NA  Pt working on the following dimensions:    Dimension #1 - Withdrawal Potential - Risk 1. No current concerns; Pt denies chemical use over the past week.    Specific goals from treatment plan addressed this week:  Patient to maintain abstinence throughout outpatient treatment.   Effectiveness of strategies:  Strategies appear to be ineffective at this time.     Dimension #2 - Biomedical - Risk 1. Pt reports the following biomedical conditions: psoriasis, psoriatic arthritis, and COPD.  Specific goals from treatment plan addressed this week:  Patient to maintain stable health throughout outpatient treatment.   Effectiveness of strategies:  Strategies appear to be effective.    Dimension #3 - Emotional/Behavioral/Cognitive - Risk 2. Pt reports continuing symptoms of mild depression which she attributes to increased time at home due to the pandemic. She continues to express considerable disappointment over her decision to use methamphetamine last week and expressed minimal insight around this decision. She has noted the forced sheltering-in-place having a deleterious effect on her though she appears non-committal around increasing her engagement with her recovery support network through meetings. Pt appears to be isolating at this point.    Active interventions to stabilize  mental health symptoms:  Ppt denies any specific interventions at this time.   Specific goals from treatment plan addressed this week:  Establish and maintain mental and emotional health.  Effectiveness of strategies:  Strategies appear to be minimally effective.    Dimension #4 - Treatment Acceptance/Resistance - Risk 3. Pt presents as non-committal around talking pro-active steps to intervene around her recent drift into chemical use. She does not appear interested in engaging telephonic or internet-based recovery support group meetings though has been informed of program expectations around this. She may be referred by to a sober house if she continues to struggle with isolation though she has indicated an unwillingness to consider this.     Specific goals from treatment plan addressed this week:  Patient to increase motivation towards recovery by participating in outpatient programming.   Effectiveness of strategies:  Strategies appear to be ineffective at this time.    Dimension #5 - Relapse Potential - Risk 3. Pt denies any chemical use over the past week. She has not yet enlisted an AA sponsor though had agreed to do so. She had agreed to engage people from her AA network and to be more proactive in initiating contact though this has not yet happened. She does not appear interested in engaging telephonic or internet-based recovery support group meetings though has been directed to attend at least one by 4/14. She may be referred by to a sober house if she continues to struggle with isolation donya she has indicated an unwillingness to consider this.  Specific goals from treatment plan addressed this week:  Develop and utilize practical, effective relapse-prevention strategies and tools.   Effectiveness of strategies:  Strategies appear to be ineffective at this time.    Dimension #6 - Recovery Environment - Risk 3. Pt receives medication management through her PCP. She is on probation in [facility] through  2/25/26. Pt reports having some friends who support her recovery. She has moved out of her sober house into a roommate situation with people she has lived with before and are supportive of her recovery although she appears to be isolating in that situation. She had agreed to engage people from her AA network and to be more proactive in initiating contact though this has not yet happened. She does not appear interested in engaging telephonic or internet-based recovery support group meetings though has been informed of program expectations around this. She may be referred by to a sober house if she continues to struggle with isolation donya she has indicated an unwillingness to consider this..   Specific goals from treatment plan addressed this week:  Establish and engage a widespread, redundant recovery support network.   Effectiveness of strategies:  Strategies appear to be effective.    T) Treatment plan updated No.  Patient notified and in agreement NA.    Patient has completed 101 of 115 program hours at this time.     Projected discharge date is 6/15/19. Current discharge plan is PENDING.     DARCIE Garcia, [facility], [facility]  4/10/2020, 2:15 PM      Weekly Educational Topics Date   1. Understanding Dual Diagnoses, week 1 1/20/20; 1/22/20; 1/24/20;   2. Understanding Dual Diagnoses, week 2 1/27/20; 1/31/20;    3. Stress Management 2/3/20; 2/7/20;    4. Feelings/Emotions 2/10/20; 2/14/20;    5. Thinking 11/27/19; 2/17/20; 2/21/20;    6. Building Recovery Support 12/2/19; 12/4/19; 2/24/20; 2/28/20;    7. Developing Assertive Communication Skills 12/9/19; 12/11/19; 12/13/19; 3/3/20;    8. Relapse Prevention 12/16/19; 12/20/19; 3/10/20;   9. Relationships/Boundaries 12/23/19; 12/27/19;   10. Grief and Loss 12/30/19; 1/3/20; 4/7/20;   11. Strengths 1/6/20; 1/8/20; 1/10/20;    12. Wellness 1/13/20; 1/15/20; 1/17/20;

## 2021-06-07 NOTE — PROGRESS NOTES
"D: Spoke to Pt via [employer] Video conferencing software for individual session. Pt and counselor met for 31 minutes.    A: Pt reports continuing abstinence from methamphetamine over the past week. She displayed poor insight around the mitigating factors surrounding her chemical use last week, indicating \"I just wanted to use.\" She has not been making daily contact with her recovery support network, nor has she attended an online recovery meeting per counselor direction and her agreement. Pt appears to possess limited insight around chemical use and progresses at moderately high risk for subsequent and/or continuing use.     R: Pt appeared receptive to counselor feedback around motivation, noting in the past she has responded most proactively when presented an ultimatum by her . Counselor noted the hope that we can find alternative means of motivation though was clear around program expectations regarding abstinence from mood-altering substances and regular engagement in recovery activities. It was explained to Pt that if she declines the resources presented and continues to struggle with likely chemical use she will almost certainly require a higher level of care and may be discharged from services for non-compliance with a referral back to probation with recommendation of residential care, likely to be facilitated through incarceration. Pt was also reminded that she remains on a program compliance contract from earlier in treatment stipulating she will follow all recommendations.    T: Pt will continue in IOP Phase III for now. She may be transitioned back to Phase I or Phase II if she continues to struggle with use, or possibly discharged for non-compliance if she elects not to follow recommendations regarding engaging recovery support.   "

## 2021-06-07 NOTE — PROGRESS NOTES
Telemedicine Visit: The patient's condition can be safely assessed and treated via synchronous audio and visual telemedicine encounter.      Reason for Telemedicine Visit: Services only offered telehealth    Originating Site (Patient Location): Patient's home    Distant Site (Provider Location): Provider Remote Setting    Consent:  The patient/guardian has verbally consented to: the potential risks and benefits of telemedicine (video visit) versus in person care; bill my insurance or make self-payment for services provided; and responsibility for payment of non-covered services.     Mode of Communication:  Video Conference via [employer]    Call Started at: 10:32 AM  Call Ended at: 11:03 AM    As the provider I attest to compliance with applicable laws and regulations related to telemedicine.

## 2021-06-07 NOTE — PROGRESS NOTES
Weekly Progress Note  Jaylin Easley  1970  [id number]      D) Pt attended 0 groups this week with 1 excused absence due to reported a cough and being directed to leave group. Patient attended 0 individual sessions this week. Patient participated in 1 telemedicine session this week. A) Staff facilitated groups and reviewed tx progress. Assessed for [facility]. R) No VAP needed at this time.     Any significant events, defines as events that impact patients relationship with others inside and outside of treatment: No.     Indicate any changes or monitoring of physical or mental health problems No    Indicate involvement by any outside supports: No.    IAPP reviewed and modified as needed. NA  Pt working on the following dimensions:    Dimension #1 - Withdrawal Potential - Risk 0. No current concerns; Pt denies any chemical use over the past week.    Specific goals from treatment plan addressed this week:  Patient to maintain abstinence throughout outpatient treatment.   Effectiveness of strategies:  Strategies appear to be effective at this time.     Dimension #2 - Biomedical - Risk 1. Pt reports the following biomedical conditions: psoriasis, psoriatic arthritis, and COPD.  Specific goals from treatment plan addressed this week:  Patient to maintain stable health throughout outpatient treatment.   Effectiveness of strategies:  Strategies appear to be effective.    Dimension #3 - Emotional/Behavioral/Cognitive - Risk 1. Pt reports feeling stable though notes symptoms of mild depression which she attributes to increased time at home due to the pandemic. She also noted not yet having retrieve a mattress and has been sleeping on the floor in her new home. She reports feeling hopeful.   Active interventions to stabilize mental health symptoms:  Ppt denies any specific interventions at this time.   Specific goals from treatment plan addressed this week:  Establish and maintain mental and emotional health.  Effectiveness  of strategies:  Strategies appear to be effective.    Dimension #4 - Treatment Acceptance/Resistance - Risk 1. Pt is currently compliant in all program areas.    Specific goals from treatment plan addressed this week:  Patient to increase motivation towards recovery by participating in outpatient programming.   Effectiveness of strategies:  Strategies appear to be effective.    Dimension #5 - Relapse Potential - Risk 2. Pt denies chemical use over the past week.She reports attending AA though will defer continuing attendance through the current pandemic. She has not yet enlisted an AA sponsor but has agreed to do so. She has agreed to engage people from her AA network and to be proactive in initiating contact.        Specific goals from treatment plan addressed this week:  Develop and utilize practical, effective relapse-prevention strategies and tools.   Effectiveness of strategies:  Strategies appear to be effective.    Dimension #6 - Recovery Environment - Risk 2. Pt receives medication management through her PCP. She is on probation in [facility] through 2/25/26. Pt reports having some friends who support her recovery. She has moved out of her sober house into a roommate situation with people she has lived with before and are supportive of her recovery. She has not yet enlisted an AA sponsor but has agreed to do so.   Specific goals from treatment plan addressed this week:  Establish and engage a widespread, redundant recovery support network.   Effectiveness of strategies:  Strategies appear to be effective.    T) Treatment plan updated No.  Patient notified and in agreement NA.    Patient has completed 98 of 115 program hours at this time.     Projected discharge date is 5/15/19. Current discharge plan is PENDING.     Jignesh Clark, DARCIE, LICSW, LADC  3/20/2020, 2:10 PM      Weekly Educational Topics Date   1. Understanding Dual Diagnoses, week 1 1/20/20; 1/22/20; 1/24/20;   2. Understanding Dual Diagnoses,  week 2 1/27/20; 1/31/20;    3. Stress Management 2/3/20; 2/7/20;    4. Feelings/Emotions 2/10/20; 2/14/20;    5. Thinking 11/27/19; 2/17/20; 2/21/20;    6. Building Recovery Support 12/2/19; 12/4/19; 2/24/20; 2/28/20;    7. Developing Assertive Communication Skills 12/9/19; 12/11/19; 12/13/19; 3/3/20;    8. Relapse Prevention 12/16/19; 12/20/19; 3/10/20;   9. Relationships/Boundaries 12/23/19; 12/27/19;   10. Grief and Loss 12/30/19; 1/3/20;    11. Strengths 1/6/20; 1/8/20; 1/10/20;    12. Wellness 1/13/20; 1/15/20; 1/17/20;

## 2021-06-07 NOTE — PROGRESS NOTES
Weekly Progress Note     Week of 4/20/20-4/24/20  Jaylin Easley  1970  [id number]      D) Pt attended 1 group via [employer] this week with 0 absences. Patient participated in 1 individual counseling session via [employer] this week. A) Staff facilitated groups and reviewed tx progress. Assessed for [facility]. R) No VAP needed at this time.     Any significant events, defines as events that impact patients relationship with others inside and outside of treatment: No.     Indicate any changes or monitoring of physical or mental health problems No    Indicate involvement by any outside supports: No.    IAPP reviewed and modified as needed. NA  Pt working on the following dimensions:    Dimension #1 - Withdrawal Potential - Risk 0. No current concerns; Pt denies chemical use over the past week.    Specific goals from treatment plan addressed this week:  Patient to maintain abstinence throughout outpatient treatment.   Effectiveness of strategies:  Strategies appear to be ineffective at this time.     Dimension #2 - Biomedical - Risk 1. Pt reports the following biomedical conditions: psoriasis, psoriatic arthritis, and COPD.  Specific goals from treatment plan addressed this week:  Patient to maintain stable health throughout outpatient treatment.   Effectiveness of strategies:  Strategies appear to be effective.    Dimension #3 - Emotional/Behavioral/Cognitive - Risk 1. Pt reports continuing though decreased symptoms of mild depression which she attributes to increased time at home due to the pandemic.She has noted the forced sheltering-in-place having a deleterious effect on her though she did report attending two online AA meetings which she noted finding very helpful.    Active interventions to stabilize mental health symptoms:  Ppt denies any specific interventions at this time.   Specific goals from treatment plan addressed this week:  Establish and maintain mental and emotional health.  Effectiveness of  strategies:  Strategies appear to be minimally effective.    Dimension #4 - Treatment Acceptance/Resistance - Risk 1. Pt appeared to increase her commitment toward taking pro-active steps in response to her recent drift into chemical use. She reportedly engaged internet-based recovery support group meetings which she noted finding helpful and plans to continue.    Specific goals from treatment plan addressed this week:  Patient to increase motivation towards recovery by participating in outpatient programming.   Effectiveness of strategies:  Strategies appear to be ineffective at this time.    Dimension #5 - Relapse Potential - Risk 1. Pt denies any chemical use over the past week. She has not yet enlisted an AA sponsor. She reportedly attended two internet-based recovery support group meetings which she noted finding helpful and plans to continue.  Specific goals from treatment plan addressed this week:  Develop and utilize practical, effective relapse-prevention strategies and tools.   Effectiveness of strategies:  Strategies appear to be ineffective at this time.    Dimension #6 - Recovery Environment - Risk 2. Pt receives medication management through her PCP. She is on probation in [facility] through 2/25/26. Pt reports having some friends who support her recovery. She reportedly attended two internet-based recovery support group meetings which she noted finding helpful and plans to continue. She reports positive contact with her adult son.  Specific goals from treatment plan addressed this week:  Establish and engage a widespread, redundant recovery support network.   Effectiveness of strategies:  Strategies appear to be effective.    T) Treatment plan updated Yes.  Patient notified and in agreement Yes.    Patient has completed 106 of 115 program hours at this time.     Projected discharge date is 6/2/19. Current discharge plan is PENDING.     Jignesh Clark, DARCIE, LICSW, LADC  4/24/2020, 9:25 PM      Weekly  Educational Topics Date   1. Understanding Dual Diagnoses, week 1 1/20/20; 1/22/20; 1/24/20;   2. Understanding Dual Diagnoses, week 2 1/27/20; 1/31/20;    3. Stress Management 2/3/20; 2/7/20;    4. Feelings/Emotions 2/10/20; 2/14/20;    5. Thinking 11/27/19; 2/17/20; 2/21/20;    6. Building Recovery Support 12/2/19; 12/4/19; 2/24/20; 2/28/20;    7. Developing Assertive Communication Skills 12/9/19; 12/11/19; 12/13/19; 3/3/20;    8. Relapse Prevention 12/16/19; 12/20/19; 3/10/20;   9. Relationships/Boundaries 12/23/19; 12/27/19;   10. Grief and Loss 12/30/19; 1/3/20; 4/7/20;   11. Strengths 1/6/20; 1/8/20; 1/10/20; 4/14/20;   12. Wellness 1/13/20; 1/15/20; 1/17/20; 4/21/20;

## 2021-06-07 NOTE — PROGRESS NOTES
Jaylin Easley attended 2 hours of group today.     The group topic was Wellness, patient was responsive to topic.     Patient's engagement in the group session: high     Total group size: 6      LINDA Cervantes, [facility]  4/21/2020, 2:02 PM

## 2021-06-07 NOTE — PROGRESS NOTES
Weekly Progress Note     Week of 3/30/20-4/3/20  Jaylin Easley  1970  [id number]      D) Pt attended 0 groups this week with 0 absences. Patient participated in 1 telemedicine session this week. A) Staff facilitated groups and reviewed tx progress. Assessed for [facility]. R) No VAP needed at this time.     Any significant events, defines as events that impact patients relationship with others inside and outside of treatment: No.     Indicate any changes or monitoring of physical or mental health problems No    Indicate involvement by any outside supports: No.    IAPP reviewed and modified as needed. NA  Pt working on the following dimensions:    Dimension #1 - Withdrawal Potential - Risk 1. No current concerns; Pt reports using methamphetamine on 3/30 and/or 3/31. She presented as tired with difficulty communicating or engaging in a conversation during telemedicine call on 3/31.    Specific goals from treatment plan addressed this week:  Patient to maintain abstinence throughout outpatient treatment.   Effectiveness of strategies:  Strategies appear to be ineffective at this time.     Dimension #2 - Biomedical - Risk 1. Pt reports the following biomedical conditions: psoriasis, psoriatic arthritis, and COPD.  Specific goals from treatment plan addressed this week:  Patient to maintain stable health throughout outpatient treatment.   Effectiveness of strategies:  Strategies appear to be effective.    Dimension #3 - Emotional/Behavioral/Cognitive - Risk 2. Pt reports continuing symptoms of mild depression which she attributes to increased time at home due to the pandemic. She expressed considerable disappointment over her decision to use methamphetamine and expressed minimal insight around this decision. She has noted the forced sheltering-in-place having a deleterious effect on her though she appears non-committal around increasing her engagement with her recovery support network through meetings. Pt appears to be  isolating at this point.    Active interventions to stabilize mental health symptoms:  Ppt denies any specific interventions at this time.   Specific goals from treatment plan addressed this week:  Establish and maintain mental and emotional health.  Effectiveness of strategies:  Strategies appear to be minimally effective.    Dimension #4 - Treatment Acceptance/Resistance - Risk 3. Pt presents as non-committal around talking pro-active steps to intervene around her recent drift into chemical use. She does not appear interested in engaging telephonic or internet-based recovery support group meetings. She may be referred by to a sober house if she continues to struggle with isolation.     Specific goals from treatment plan addressed this week:  Patient to increase motivation towards recovery by participating in outpatient programming.   Effectiveness of strategies:  Strategies appear to be ineffective at this time.    Dimension #5 - Relapse Potential - Risk 3. Pt reports using methamphetamine on 3/30 and/or 3/31. She presented as tired with difficulty communicating or engaging in a conversation during telemedicine call on 3/31. She has not yet enlisted an AA sponsor though had agreed to do so. She had agreed to engage people from her AA network and to be more proactive in initiating contact though this has not yet happened. She does not appear interested in engaging telephonic or internet-based recovery support group meetings. She may be referred by to a sober house if she continues to struggle with isolation.  Specific goals from treatment plan addressed this week:  Develop and utilize practical, effective relapse-prevention strategies and tools.   Effectiveness of strategies:  Strategies appear to be ineffective at this time.    Dimension #6 - Recovery Environment - Risk 3. Pt receives medication management through her PCP. She is on probation in [facility] through 2/25/26. Pt reports having some friends who  support her recovery. She has moved out of her sober house into a roommate situation with people she has lived with before and are supportive of her recovery although she appears to be isolating in that situation. She had agreed to engage people from her AA network and to be more proactive in initiating contact though this has not yet happened. She does not appear interested in engaging telephonic or internet-based recovery support group meetings. She may be referred by to a sober house if she continues to struggle with isolation.   Specific goals from treatment plan addressed this week:  Establish and engage a widespread, redundant recovery support network.   Effectiveness of strategies:  Strategies appear to be effective.    T) Treatment plan updated No.  Patient notified and in agreement NA.    Patient has completed 98 of 115 program hours at this time.     Projected discharge date is 5/15/19. Current discharge plan is PENDING.     Jignesh Clark, DARCIE, [facility], [facility]  4/3/2020, 1:59 PM      Weekly Educational Topics Date   1. Understanding Dual Diagnoses, week 1 1/20/20; 1/22/20; 1/24/20;   2. Understanding Dual Diagnoses, week 2 1/27/20; 1/31/20;    3. Stress Management 2/3/20; 2/7/20;    4. Feelings/Emotions 2/10/20; 2/14/20;    5. Thinking 11/27/19; 2/17/20; 2/21/20;    6. Building Recovery Support 12/2/19; 12/4/19; 2/24/20; 2/28/20;    7. Developing Assertive Communication Skills 12/9/19; 12/11/19; 12/13/19; 3/3/20;    8. Relapse Prevention 12/16/19; 12/20/19; 3/10/20;   9. Relationships/Boundaries 12/23/19; 12/27/19;   10. Grief and Loss 12/30/19; 1/3/20;    11. Strengths 1/6/20; 1/8/20; 1/10/20;    12. Wellness 1/13/20; 1/15/20; 1/17/20;

## 2021-06-07 NOTE — PROGRESS NOTES
Telemedicine Visit: The patient's condition can be safely assessed and treated via synchronous audio and visual telemedicine encounter.      Reason for Telemedicine Visit: Services only offered telehealth    Originating Site (Patient Location): Patient's home    Distant Site (Provider Location): Provider Remote Setting    Consent:  The patient/guardian has verbally consented to: the potential risks and benefits of telemedicine (video visit) versus in person care; bill my insurance or make self-payment for services provided; and responsibility for payment of non-covered services.     Mode of Communication:  Video Conference via [employer]    Call Started at: 10:32 AM  Call Ended at: 11:03 AM    As the provider I attest to compliance with applicable laws and regulations related to telemedicine.

## 2021-06-07 NOTE — PROGRESS NOTES
Individual Phone Check-in Note    Jaylin Easley is a 50 y.o. female who is being evaluated via telephone visit.      Counselor and patient spoke via phone for 30 minutes to discuss treatment progress.     Call Notes: Counselor contacted patient for continuity of care during suspension of in person services. Pt reports continuing abstinence from mood-altering substances. She notes feeling she's on house arrest during the current pandemic. She denies any cravings or thoughts around using and distinguishes her current period of sobriety by her stated desire to remain abstinent, which is different from past periods of sobriety. She reports daily bible readings and reflection. She has not participated in any online recovery support group meetings as was not willing to commit to doing so at this time. She reports contact with a friend from recovery every other day to check in via text and agreed to enlist another person from her network to fill in the off days. She receives that [employer] daily email and agreed to begin reading it daily. She has not yet procured a mattress though hopes to do so over the next week. 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 She reports regular contact and positive relationships with her family. She notes her current living situation is going well.    Patient treatment was reviewed. Changes were not made. Patient was actively and directly involved in the treatment plan review and updates. I have reviewed the note as documented above.  This accurately captures the substance of my conversation with the patient.      Call Started at: 10:30 AM  Call Ended at: 11:00 AM    Patient's engagement in the telephone visit: low     Supervising MD: Dr. Vanessa Clark, [facility], [facility]  3/24/2020, 10:37 AM

## 2021-06-07 NOTE — PROGRESS NOTES
Telemedicine Visit: The patient's condition can be safely assessed and treated via synchronous audio and visual telemedicine encounter.      Reason for Telemedicine Visit: Services only offered telehealth    Originating Site (Patient Location): Patient's home    Distant Site (Provider Location): Provider Remote Setting    Consent:  The patient/guardian has verbally consented to: the potential risks and benefits of telemedicine (video visit) versus in person care; bill my insurance or make self-payment for services provided; and responsibility for payment of non-covered services.     Mode of Communication:  Video Conference via [employer]    Call Started at: 10:00 AM  Call Ended at: 12:00 PM    As the provider I attest to compliance with applicable laws and regulations related to telemedicine.

## 2021-06-07 NOTE — PROGRESS NOTES
Weekly Progress Note for week of 4/27/20-5/1/20  Jaylin Easley  1970  [id number]      D) Pt attended ZERO groups this week with 1 unexcused absences reportedly due to being out of town. A) Staff facilitated groups on topic Co-Occurring Disorders Wk 1 and reviewed tx progress. Assessed for [facility]. R) Unable to assess along six dimensions or for VA due to lack of attendance.   T) Pt has completed 106 hours and is tentatively scheduled to complete programming on 6/9/20. Pt is expected to return to programming on 5/5/20.

## 2021-06-07 NOTE — PROGRESS NOTES
Jaylin Easley attended 2 hours of group today.     The group topic was Grief and Loss, patient was responsive to topic.     Patient's engagement in the group session: medium     Total group size: 5    Pt processed her recent methamphetamine use with group. She committed to attending an electronic recovery support group meeting before her individual appt with counselor on 4/9.     LINDA Cervantes, [facility]  4/7/2020, 1:42 PM   no

## 2021-06-07 NOTE — PROGRESS NOTES
D: Pt attended individual counseling session via [employer] video. Session lasted 31 minutes.     A: Pt reports continuing abstinence from methamphetamine. She denies any significant cravings. She denies any outstanding medical or MH concerns at this time. She reports 2 AA meetings via Zoom per week and notes reading a recovery meditation daily. She reports regular contact with friends and family and notes continuing boundaries around people who have been detrimental to her recovery in the past.    R: Pt and counselor reviewed her pending completion of IOP, currently scheduled for 6/2/20. Pt understands completion is contingent on her continued progress as evidenced by continuing abstinence from mood-altering substances and engagement in her recovery support network.    T: Pt will continue [facility] IOP through 6/2/20.

## 2021-06-07 NOTE — PROGRESS NOTES
Weekly Progress Note     Week of 3/23/20-3/27/20  Jaylin Easley  1970  [id number]      D) Pt attended 0 groups this week with 0 absences. Patient attended 0 individual sessions this week. Patient participated in 1 telemedicine session this week. A) Staff facilitated groups and reviewed tx progress. Assessed for [facility]. R) No VAP needed at this time.     Any significant events, defines as events that impact patients relationship with others inside and outside of treatment: No.     Indicate any changes or monitoring of physical or mental health problems No    Indicate involvement by any outside supports: No.    IAPP reviewed and modified as needed. NA  Pt working on the following dimensions:    Dimension #1 - Withdrawal Potential - Risk 0. No current concerns; Pt denies any chemical use over the past week.    Specific goals from treatment plan addressed this week:  Patient to maintain abstinence throughout outpatient treatment.   Effectiveness of strategies:  Strategies appear to be effective at this time.     Dimension #2 - Biomedical - Risk 1. Pt reports the following biomedical conditions: psoriasis, psoriatic arthritis, and COPD.  Specific goals from treatment plan addressed this week:  Patient to maintain stable health throughout outpatient treatment.   Effectiveness of strategies:  Strategies appear to be effective.    Dimension #3 - Emotional/Behavioral/Cognitive - Risk 1. Pt reports feeling stable though notes symptoms of mild depression which she attributes to increased time at home due to the pandemic. She also noted not yet having retrieve a mattress and has been sleeping on the floor in her new home. She reports feeling hopeful.   Active interventions to stabilize mental health symptoms:  Ppt denies any specific interventions at this time.   Specific goals from treatment plan addressed this week:  Establish and maintain mental and emotional health.  Effectiveness of strategies:  Strategies appear to  be effective.    Dimension #4 - Treatment Acceptance/Resistance - Risk 1. Pt is currently compliant in all program areas.    Specific goals from treatment plan addressed this week:  Patient to increase motivation towards recovery by participating in outpatient programming.   Effectiveness of strategies:  Strategies appear to be effective.    Dimension #5 - Relapse Potential - Risk 2. Pt denies chemical use over the past week. She will defer AA attendance through the current pandemic. She has not yet enlisted an AA sponsor but has agreed to do so. She has agreed to engage people from her AA network and to be more proactive in initiating contact.        Specific goals from treatment plan addressed this week:  Develop and utilize practical, effective relapse-prevention strategies and tools.   Effectiveness of strategies:  Strategies appear to be effective.    Dimension #6 - Recovery Environment - Risk 2. Pt receives medication management through her PCP. She is on probation in [facility] through 2/25/26. Pt reports having some friends who support her recovery. She has moved out of her sober house into a roommate situation with people she has lived with before and are supportive of her recovery. She has not yet enlisted an AA sponsor but has agreed to do so.   Specific goals from treatment plan addressed this week:  Establish and engage a widespread, redundant recovery support network.   Effectiveness of strategies:  Strategies appear to be effective.    T) Treatment plan updated No.  Patient notified and in agreement NA.    Patient has completed 98 of 115 program hours at this time.     Projected discharge date is 5/15/19. Current discharge plan is PENDING.     DARCIE Garcia, [facility], [facility]  3/26/2020, 2:31 PM      Weekly Educational Topics Date   1. Understanding Dual Diagnoses, week 1 1/20/20; 1/22/20; 1/24/20;   2. Understanding Dual Diagnoses, week 2 1/27/20; 1/31/20;    3. Stress Management 2/3/20; 2/7/20;     4. Feelings/Emotions 2/10/20; 2/14/20;    5. Thinking 11/27/19; 2/17/20; 2/21/20;    6. Building Recovery Support 12/2/19; 12/4/19; 2/24/20; 2/28/20;    7. Developing Assertive Communication Skills 12/9/19; 12/11/19; 12/13/19; 3/3/20;    8. Relapse Prevention 12/16/19; 12/20/19; 3/10/20;   9. Relationships/Boundaries 12/23/19; 12/27/19;   10. Grief and Loss 12/30/19; 1/3/20;    11. Strengths 1/6/20; 1/8/20; 1/10/20;    12. Wellness 1/13/20; 1/15/20; 1/17/20;

## 2021-06-07 NOTE — PROGRESS NOTES
Pt sent this email to counselor at 10:04am today:    Good morning Jignesh,   I'm not able to attend this morning's group at 10:30 can I reschedule and take it tomorrow instead I've too much going on this morning if you need to talk to me [phone] thank you...    Pt will be scheduled for IOP for tomorrow, 5/6/20 as requested.

## 2021-06-07 NOTE — PROGRESS NOTES
Telemedicine Visit: The patient's condition can be safely assessed and treated via synchronous audio and visual telemedicine encounter.      Reason for Telemedicine Visit: Services only offered telehealth    Originating Site (Patient Location): Patient's home    Distant Site (Provider Location): Provider Remote Setting    Consent:  The patient/guardian has verbally consented to: the potential risks and benefits of telemedicine (video visit) versus in person care; bill my insurance or make self-payment for services provided; and responsibility for payment of non-covered services.     Mode of Communication:  Video Conference via [employer]    Call Started at: 10:00 AM  Call Ended at: 12:00 PM    As the provider I attest to compliance with applicable laws and regulations related to telemedicine.

## 2021-06-07 NOTE — PROGRESS NOTES
Weekly Progress Note     Week of 4/13/20-4/17/20  Jaylin Easley  1970  [id number]      D) Pt attended 1 group via [employer] this week with 0 absences. Patient participated in 0 individual counseling sessions via [employer] this week. A) Staff facilitated groups and reviewed tx progress. Assessed for [facility]. R) No VAP needed at this time.     Any significant events, defines as events that impact patients relationship with others inside and outside of treatment: No.     Indicate any changes or monitoring of physical or mental health problems No    Indicate involvement by any outside supports: No.    IAPP reviewed and modified as needed. NA  Pt working on the following dimensions:    Dimension #1 - Withdrawal Potential - Risk 1. No current concerns; Pt denies chemical use over the past week.    Specific goals from treatment plan addressed this week:  Patient to maintain abstinence throughout outpatient treatment.   Effectiveness of strategies:  Strategies appear to be ineffective at this time.     Dimension #2 - Biomedical - Risk 1. Pt reports the following biomedical conditions: psoriasis, psoriatic arthritis, and COPD.  Specific goals from treatment plan addressed this week:  Patient to maintain stable health throughout outpatient treatment.   Effectiveness of strategies:  Strategies appear to be effective.    Dimension #3 - Emotional/Behavioral/Cognitive - Risk 1. Pt reports continuing though decreased symptoms of mild depression which she attributes to increased time at home due to the pandemic.She has noted the forced sheltering-in-place having a deleterious effect on her though she did report attending two online AA meetings which she noted finding very helpful.    Active interventions to stabilize mental health symptoms:  Ppt denies any specific interventions at this time.   Specific goals from treatment plan addressed this week:  Establish and maintain mental and emotional health.  Effectiveness of  strategies:  Strategies appear to be minimally effective.    Dimension #4 - Treatment Acceptance/Resistance - Risk 2. Pt appeared to increase her commitment toward taking pro-active steps in response to her recent drift into chemical use. She reportedly engaged internet-based recovery support group meetings which she noted finding helpful and plans to continue.    Specific goals from treatment plan addressed this week:  Patient to increase motivation towards recovery by participating in outpatient programming.   Effectiveness of strategies:  Strategies appear to be ineffective at this time.    Dimension #5 - Relapse Potential - Risk 2. Pt denies any chemical use over the past week. She has not yet enlisted an AA sponsor. She had agreed to engage people from her AA network and to be more proactive in initiating contact though this has not yet happened. She reportedly engaged internet-based recovery support group meetings which she noted finding helpful and plans to continue.  Specific goals from treatment plan addressed this week:  Develop and utilize practical, effective relapse-prevention strategies and tools.   Effectiveness of strategies:  Strategies appear to be ineffective at this time.    Dimension #6 - Recovery Environment - Risk 3. Pt receives medication management through her PCP. She is on probation in [facility] through 2/25/26. Pt reports having some friends who support her recovery. She has moved out of her sober house into a roommate situation with people she has lived with before and are supportive of her recovery although she appears to be isolating in that situation. She had agreed to engage people from her AA network and to be more proactive in initiating contact though this has not yet happened. She reportedly engaged internet-based recovery support group meetings which she noted finding helpful and plans to continue.  Specific goals from treatment plan addressed this week:  Establish and engage  a widespread, redundant recovery support network.   Effectiveness of strategies:  Strategies appear to be effective.    T) Treatment plan updated No.  Patient notified and in agreement NA.    Patient has completed 103 of 115 program hours at this time.     Projected discharge date is 6/2/19. Current discharge plan is PENDING.     DARCIE Garcia, [facility], [facility]  4/17/2020, 9:52 PM      Weekly Educational Topics Date   1. Understanding Dual Diagnoses, week 1 1/20/20; 1/22/20; 1/24/20;   2. Understanding Dual Diagnoses, week 2 1/27/20; 1/31/20;    3. Stress Management 2/3/20; 2/7/20;    4. Feelings/Emotions 2/10/20; 2/14/20;    5. Thinking 11/27/19; 2/17/20; 2/21/20;    6. Building Recovery Support 12/2/19; 12/4/19; 2/24/20; 2/28/20;    7. Developing Assertive Communication Skills 12/9/19; 12/11/19; 12/13/19; 3/3/20;    8. Relapse Prevention 12/16/19; 12/20/19; 3/10/20;   9. Relationships/Boundaries 12/23/19; 12/27/19;   10. Grief and Loss 12/30/19; 1/3/20; 4/7/20;   11. Strengths 1/6/20; 1/8/20; 1/10/20; 4/14/20;   12. Wellness 1/13/20; 1/15/20; 1/17/20;

## 2021-06-07 NOTE — PROGRESS NOTES
Jaylin Easley attended 2 hours of group today.     The group topic was Strengths, patient was responsive to topic.     Patient's engagement in the group session: medium     Total group size: 6      LINDA Cervantes, [facility]  4/14/2020, 12:10 PM

## 2021-06-07 NOTE — TELEPHONE ENCOUNTER
Called Pt for scheduled weekly telemedicine visit. Pt did not answer and so left VM requesting she contact counselor with a good time for her to talk as well as her correct email address so counselor can forward her instructions for remote groups beginning next week.

## 2021-06-07 NOTE — TELEPHONE ENCOUNTER
Spoke to Pt briefly via [employer] video to confirm the software/bang is setup on her Android phone and working properly. After some initial complication Pt confirmed that it worked as intended. Pt understands she is scheduled to resume IOP Phase III groups beginning next Tuesday, 4/7 at 10am, and biweekly individual counseling sessions beginning next Thursday, 4/9 at 10:30am. Pt understands all sessions will be conducted remotely via [employer] Touch[employer] until further notice. Pt was reminded of her agreement to proactively reach out to another person in recovery daily and to attend an online recovery meeting of her choosing by 4/7. Pt was reminded that her moving out of her sober house into her current living situation was contingent on her agreeing to follow treatment recommendations, which at this point includes more pro-active efforts on her part to engage her recovery network. Pt indicated understanding of the above.

## 2021-06-08 NOTE — PROGRESS NOTES
Weekly Progress Note     Week of 5/18/20-5/22/20  Jaylin Easley  1970  [id number]      D) Pt attended 1 group via [employer] this week with 0 absences. Patient participated in 0 individual counseling sessions via [employer] this week. A) Staff facilitated groups and reviewed tx progress. Assessed for [facility]. R) No VAP needed at this time.     Any significant events, defines as events that impact patients relationship with others inside and outside of treatment: No.     Indicate any changes or monitoring of physical or mental health problems No    Indicate involvement by any outside supports: No.    IAPP reviewed and modified as needed. NA  Pt working on the following dimensions:    Dimension #1 - Withdrawal Potential - Risk 0. No current concerns; Pt denies chemical use over the past week. UA on 5/8 screened NEG for all substances. No withdrawal symptoms reported this week.  Specific goals from treatment plan addressed this week:  Patient to maintain abstinence throughout outpatient treatment.   Effectiveness of strategies:  Strategies appear to be ineffective at this time.     Dimension #2 - Biomedical - Risk 1. Pt reports the following biomedical conditions: psoriasis, psoriatic arthritis, and COPD. Patient reports planting flowers in her garden for physical and mental health exercise this week.  Specific goals from treatment plan addressed this week:  Patient to maintain stable health throughout outpatient treatment.   Effectiveness of strategies:  Strategies appear to be effective.    Dimension #3 - Emotional/Behavioral/Cognitive - Risk 1. Pt reports continuing though decreased symptoms of mild depression which she attributes to increased time at home due to the pandemic. She has noted the forced sheltering-in-place having a deleterious effect on her though she did report attending two online AA meetings which she noted finding very helpful. She has also been continuing to spend more time outside  gardening which she reports is therapeutic. She also notes having spent time with an adult son which she enjoys. This week, patient requested time to discuss distress and regret regarding impulse control actions while experiencing hypomania episodes during the past week. The particular event she described involved a text message sent to her brother. She reports the episodes generally last up to two weeks. She was given time to to process her feelings, concerns and fears. Patient welcomed supportive feedback from her peers during session. She was commended for her courage to discuss and explore her feelings with her group peers. Patient was encouraged to further discuss with her primary counselor upon his return next week. Patient reports needing medication adjustment.  Active interventions to stabilize mental health symptoms:  Ppt denies any specific interventions at this time.   Specific goals from treatment plan addressed this week:  Establish and maintain mental and emotional health.  Effectiveness of strategies:  Strategies appear to be effective.    Dimension #4 - Treatment Acceptance/Resistance - Risk 0. No current concerns. Patient was actively engaged during group session. She reports motivation for treatment is support of her group members.  Specific goals from treatment plan addressed this week:  Patient to increase motivation towards recovery by participating in outpatient programming.   Effectiveness of strategies:  Strategies appear to be ineffective at this time.    Dimension #5 - Relapse Potential - Risk 1. Pt denies any chemical use over the past week. She has not yet enlisted an AA sponsor. She reportedly attended two internet-based recovery support group meetings which she noted finding helpful and plans to continue. Patient was open to group discussion on the importance of establishing a sober network to avoid relapse.   Specific goals from treatment plan addressed this week:  Develop and utilize  practical, effective relapse-prevention strategies and tools.   Effectiveness of strategies:  Strategies appear to be effective at this time.    Dimension #6 - Recovery Environment - Risk 2. Pt receives medication management through her PCP. She is on probation in [facility] through 2/25/26. Pt reports having some friends who support her recovery. She reportedly attended two internet-based recovery support group meetings which she noted finding helpful and plans to continue. She reports positive contact with her adult son.  Specific goals from treatment plan addressed this week:  Establish and engage a widespread, redundant recovery support network.   Effectiveness of strategies:  Strategies appear to be effective.    T) Treatment plan updated No.  Patient notified and in agreement NA.    Patient has completed 113 of 115 program hours at this time.     Projected discharge date is 6/9/19. Current discharge plan is PENDING.     MELVI Breen  5/21/2020, 11:09 AM      Weekly Educational Topics Date   1. Understanding Dual Diagnoses, week 1 1/20/20; 1/22/20; 1/24/20;   2. Understanding Dual Diagnoses, week 2 1/27/20; 1/31/20; 5/6/20;    3. Stress Management 2/3/20; 2/7/20; 5/12/20;   4. Feelings/Emotions 2/10/20; 2/14/20;    5. Thinking 11/27/19; 2/17/20; 2/21/20;    6. Building Recovery Support 12/2/19; 12/4/19; 2/24/20; 2/28/20; 5/19/20;   7. Developing Assertive Communication Skills 12/9/19; 12/11/19; 12/13/19; 3/3/20;    8. Relapse Prevention 12/16/19; 12/20/19; 3/10/20;   9. Relationships/Boundaries 12/23/19; 12/27/19;   10. Grief and Loss 12/30/19; 1/3/20; 4/7/20;   11. Strengths 1/6/20; 1/8/20; 1/10/20; 4/14/20;   12. Wellness 1/13/20; 1/15/20; 1/17/20; 4/21/20;

## 2021-06-08 NOTE — PROGRESS NOTES
Weekly Progress Note     Week of 6/8/20-6/12/20  Jaylin Easley  1970  [id number]      D) Pt attended 0 groups this week with 1 unexcused absence due to having left town. Patient participated in 1 individual counseling session this week. A) Staff facilitated groups and reviewed tx progress. Assessed for [facility]. R) No VAP needed at this time.     Any significant events, defines as events that impact patients relationship with others inside and outside of treatment: No.     Indicate any changes or monitoring of physical or mental health problems: Pt continues to endorse symptoms of hypomania though notes these have decreased. She has also missed a required UA.     Indicate involvement by any outside supports: Yes.    IAPP reviewed and modified as needed. NA  Pt working on the following dimensions:    Dimension #1 - Withdrawal Potential - Risk 0. No current concerns; Pt denies chemical use over the past week. Pt missed required UA by 6/12.    Specific goals from treatment plan addressed this week:  Patient to maintain abstinence throughout outpatient treatment.   Effectiveness of strategies:  Strategies appear to be ineffective at this time.     Dimension #2 - Biomedical - Risk 1. Pt reports the following biomedical conditions: psoriasis, psoriatic arthritis, and COPD.  Specific goals from treatment plan addressed this week:  Patient to maintain stable health throughout outpatient treatment.   Effectiveness of strategies:  Strategies appear to be effective.    Dimension #3 - Emotional/Behavioral/Cognitive - Risk 2. Pt reports some continuing, though reduced, symptoms of hypomania, reporting continuingsymptoms of hypomania though indicated she feels her current manic episode is somewhat leveling off. She was reportedly able to  her prescription for Klonopin and reports taking PRN per prescription and that these help. She denies any SI or thoughts around self-harm. She chose to leave town to spend time with a  friend in [facility].  Active interventions to stabilize mental health symptoms:  Pt provided seemingly contradicting information around medication.   Specific goals from treatment plan addressed this week:  Establish and maintain mental and emotional health.  Effectiveness of strategies:  Strategies appear ineffective at this time.    Dimension #4 - Treatment Acceptance/Resistance - Risk 3. Pt has not yet submitted a required UA. She chose to leave town to spend time with a friend in [place] [facility] despite still being enrolled in [facility]. Pt will be required to submit a UA by Friday, 6/19. Pt is next scheduled to speak with counselor by phone on Monday, 6/15/20.  Specific goals from treatment plan addressed this weekly:  Patient to increase motivation towards recovery by participating in outpatient programming.   Effectiveness of strategies:  Strategies appear to be ineffective at this time.    Dimension #5 - Relapse Potential - Risk 2. Pt denies any chemical use over the past week. She has not yet enlisted an AA sponsor. It is unclear whether she has attended any online recovery support groups since going [facility]. She continues to cycle hypomanic which increases chances of relapse, especially if she finds methamphetamine to have a grounding effect. She missed a required UA due by 6/12.   Specific goals from treatment plan addressed this week:  Develop and utilize practical, effective relapse-prevention strategies and tools.   Effectiveness of strategies:  Strategies appear to be questionable tive at this time.    Dimension #6 - Recovery Environment - Risk 2. Pt reports having recently had her medication adjusted through her PCP and reportedly has an appointment to see a psychiatrist in late July. She continues probation in [facility] through 2/25/26. Pt reports having some friends who support her recovery.  It is unclear whether she has attended any online recovery support groups since [facility].  Specific goals from treatment plan addressed this week:  Establish and engage a widespread, redundant recovery support network.   Effectiveness of strategies:  Strategies appear to be questionable at this time.    T) Treatment plan updated No.  Patient notified and in agreement NA.    Patient was educated on NA. Patient has completed 115 program hours at this time.     Projected discharge date is 6/16/19. Current discharge plan is PENDING.     Jignesh Clark, MSW, [facility], [facility]  6/12/2020, 10:44 PM      Weekly Educational Topics Date   1. Understanding Dual Diagnoses, week 1 1/20/20; 1/22/20; 1/24/20;   2. Understanding Dual Diagnoses, week 2 1/27/20; 1/31/20; 5/6/20;    3. Stress Management 2/3/20; 2/7/20; 5/12/20;   4. Feelings/Emotions 2/10/20; 2/14/20;    5. Thinking 11/27/19; 2/17/20; 2/21/20; 2/26/20;   6. Building Recovery Support 12/2/19; 12/4/19; 2/24/20; 2/28/20;    7. Developing Assertive Communication Skills 12/9/19; 12/11/19; 12/13/19; 3/3/20;    8. Relapse Prevention 12/16/19; 12/20/19; 3/10/20;   9. Relationships/Boundaries 12/23/19; 12/27/19;   10. Grief and Loss 12/30/19; 1/3/20; 4/7/20;   11. Strengths 1/6/20; 1/8/20; 1/10/20; 4/14/20;   12. Wellness 1/13/20; 1/15/20; 1/17/20; 4/21/20;

## 2021-06-08 NOTE — TELEPHONE ENCOUNTER
Spoke with Pt briefly on phone this morning. She reports continuing abstinence from all non-prescribed mood-altering substances. She reported continuing to struggling with symptoms of hypomania though indicated she feels her current manic episode is somewhat leveling off. She was reportedly able to  her prescription for Klonopin and reports taking PRN per prescription and that these help. She left town to visit a friend in [place]. Pt does not have ready access to a vehicle at this time and is dependent on others for transportation. She was scheduled to complete programming today; this is being deferred until 6/15 at the earliest, during which Pt will submit a UA at  as previously directed. Pt is also scheduled to for another telephone call with counselor at 12pm on 6/15 to monitor for continuing hypomania.

## 2021-06-08 NOTE — PROGRESS NOTES
Jaylin Easley attended 3 hours of group today.     The group topic was Dual Diagnosis II, patient was responsive to topic.     Patient's engagement in the group session: high     Total group size: 3      LINDA Cervantes, [facility]  5/6/2020, 2:49 PM

## 2021-06-08 NOTE — PROGRESS NOTES
"D: Pt participated in individual counseling session via [employer] video. Session lasted 35 minutes. Pt experienced difficulty getting the [employer] Touchpoint bang to work on her phone and so session was completed via telephone.     A: Pt reports continuing abstinence from all mood-altering substances. She believes she is coming out of a depressive episode and is concerned she may be entering \"manic season.\" She reports continuing medication-compliance and agreed to contact her provider in [place] to schedule a tele-appointment to explore adjusting her medications. She reports attending two virtual AA meetings over the past week. She notes continuing close relationships with her adult children and her roommates. She plans to attend a national prayer event this evening with an adult son. She denies any cravings to use though does note passing thoughts.     R: Pt appears to be responding well to treatment at this time. She has agreed to submit a UA at  by the end of the day Friday, 5/8/20.     T: Continue MICD IOP Phase III. Currently scheduled to complete programming on 6/9/20.  "

## 2021-06-08 NOTE — PROGRESS NOTES
Weekly Progress Note     Week of 6/1/20-6/5/20  Jaylin Easley  1970  [id number]      D) Pt attended 0 groups this week with 1 excused absence due to a current hypomanic episode. Patient participated in 1 individual counseling session this week. A) Staff facilitated groups and reviewed tx progress. Assessed for [facility]. R) No VAP needed at this time.     Any significant events, defines as events that impact patients relationship with others inside and outside of treatment: No.     Indicate any changes or monitoring of physical or mental health problems: Pt continues to endorse symptoms of hypomania and has been inconsistent and incomplete in her contact with counselor over the past week. She has also missed a required UA.     Indicate involvement by any outside supports: Yes.    IAPP reviewed and modified as needed. NA  Pt working on the following dimensions:    Dimension #1 - Withdrawal Potential - Risk 0. No current concerns; Pt denies chemical use over the past week. Pt missed required UA by 6/5.    Specific goals from treatment plan addressed this week:  Patient to maintain abstinence throughout outpatient treatment.   Effectiveness of strategies:  Strategies appear to be ineffective at this time.     Dimension #2 - Biomedical - Risk 1. Pt reports the following biomedical conditions: psoriasis, psoriatic arthritis, and COPD.  Specific goals from treatment plan addressed this week:  Patient to maintain stable health throughout outpatient treatment.   Effectiveness of strategies:  Strategies appear to be effective.    Dimension #3 - Emotional/Behavioral/Cognitive - Risk 3. Pt endorsed and presented hypomanic during individual counseling session on 6/1 as evidenced by heightened and excitable mood, difficulty maintaining focus, [id number], increased energy and self-report. She reported having had her medications adjusted by her PCP and also noted an upcoming medication management appointment with a  psychiatrist in late July. She indicated some difficulties obtaining one of her medications and was expecting a neighbor to give her a ride to her pharmacy but did not follow up with counselor on whether this happened. She reports continuing AA engagement via Zoom and notes that gardening grounds her. She reported her manic/depressive cycle usually lasts about 4 months each, and states this is her first significant bout of hypomania in approximately 2 years. She denies any SI or thoughts around self-harm.  She does meet the following risk severity criteria under Dimension #3: Severely impaired in significant life areas and has severe symptoms of emotional, behavioral, or cognitive problems that interfere with the client s participation in Tx activities.  Active interventions to stabilize mental health symptoms:  Pt provided seemingly contradicting information around medication.   Specific goals from treatment plan addressed this week:  Establish and maintain mental and emotional health.  Effectiveness of strategies:  Strategies appear ineffective at this time.    Dimension #4 - Treatment Acceptance/Resistance - Risk 3. Last week Pt reportedly slept through a scheduled individual counseling session that had been scheduled less than 24 hours prior to the appointment time.This week Pt failed to follow-up with counselor following individual counseling session and missed a required  UA. She has been inconsistent in her communication and her voicemail remains full, making contacting her very difficult.   Specific goals from treatment plan addressed this week:  Patient to increase motivation towards recovery by participating in outpatient programming.   Effectiveness of strategies:  Strategies appear to be ineffective at this time.    Dimension #5 - Relapse Potential - Risk 3. Pt denies any chemical use over the past week. She has not yet enlisted an AA sponsor. She reportedly attended two internet-based recovery support  group meetings which she noted finding helpful and plans to continue. She continues to cycle hypomanic which increases chances of relapse, especially if she finds methamphetamine to have a grounding effect. She missed a required UA due by 6/5.   Specific goals from treatment plan addressed this week:  Develop and utilize practical, effective relapse-prevention strategies and tools.   Effectiveness of strategies:  Strategies appear to be questionable tive at this time.    Dimension #6 - Recovery Environment - Risk 2. Pt reports having recently had her medication adjusted through her PCP and reportedly has an appointment to see a psychiatrist in late July. She continues probation in [facility] through 2/25/26. Pt reports having some friends who support her recovery. She reportedly attended two internet-based recovery support group meetings which she noted finding helpful and plans to continue.   Specific goals from treatment plan addressed this week:  Establish and engage a widespread, redundant recovery support network.   Effectiveness of strategies:  Strategies appear to be questionable at this time.    T) Treatment plan updated No.  Patient notified and in agreement NA.    Patient was educated on NA. Patient has completed 115 program hours at this time.     Projected discharge date is 6/9/19. Current discharge plan is PENDING.     Jignesh Clark, DARCIE, [facility], [facility]  6/5/2020, 9:35 PM      Weekly Educational Topics Date   1. Understanding Dual Diagnoses, week 1 1/20/20; 1/22/20; 1/24/20;   2. Understanding Dual Diagnoses, week 2 1/27/20; 1/31/20; 5/6/20;    3. Stress Management 2/3/20; 2/7/20; 5/12/20;   4. Feelings/Emotions 2/10/20; 2/14/20;    5. Thinking 11/27/19; 2/17/20; 2/21/20; 2/26/20;   6. Building Recovery Support 12/2/19; 12/4/19; 2/24/20; 2/28/20;    7. Developing Assertive Communication Skills 12/9/19; 12/11/19; 12/13/19; 3/3/20;    8. Relapse Prevention 12/16/19; 12/20/19; 3/10/20;   9.  Relationships/Boundaries 12/23/19; 12/27/19;   10. Grief and Loss 12/30/19; 1/3/20; 4/7/20;   11. Strengths 1/6/20; 1/8/20; 1/10/20; 4/14/20;   12. Wellness 1/13/20; 1/15/20; 1/17/20; 4/21/20;

## 2021-06-08 NOTE — PROGRESS NOTES
Addiction Outpatient Weekly Clinical Staffing      Name: Jaylin Easley  MRN: [id number]    Diagnosis: Stimulant Use Disorder, severe amphetamine type substance (F15.20)    Admit Date: 11/25/19    Program:[facility]   Date: 06/03/2020     Present:  Angelique Garcia, [facility], [facility], Michelle Saba, [facility], Kalin Culp, [facility], LINDA Cervantes, [facility], [facility], Lyric España, [facility] and Donald Welander, [facility]     Dimension One:  0   Dimension Two:  0   Dimension Three:  3     Dimension Four:  3    Dimension Five: 3   Dimension Six:  1     Specific Treatment Plan Methods and Goals:  Continue peer recovery engagement; Continue medication compliance; Comply with terms of probation; Continue healthy outdoor activities;       Specific Identified Strengths and/or Barriers:  Current hypomanic episode; Recent missed phone calls with counselor; Transportation barriers impeding picking up medication; Recent uncompleted UA; Psychiatric appointment isn't until late July.       Developed Plan:  Pt was scheduled to complete IOP on 6/9 but will be retained in programming, working with counselor on an individual basis, until abating of hypomanic symptoms.     Date: 6/5/2020 Time: 8:56 PM    Staff Signature: LINDA Cervantes LADC

## 2021-06-08 NOTE — PROGRESS NOTES
Telemedicine Visit: The patient's condition can be safely assessed and treated via synchronous audio and visual telemedicine encounter.      Reason for Telemedicine Visit: Services only offered telehealth    Originating Site (Patient Location): Patient's home    Distant Site (Provider Location): Provider Remote Setting    Consent:  The patient/guardian has verbally consented to: the potential risks and benefits of telemedicine (video visit) versus in person care; bill my insurance or make self-payment for services provided; and responsibility for payment of non-covered services.     Mode of Communication:  Video Conference via [employer]    Call Started at: 10:00 AM  Call Ended at: 12:00 PM    As the provider I attest to compliance with applicable laws and regulations related to telemedicine.

## 2021-06-08 NOTE — PROGRESS NOTES
Jaylin Easley attended 2 hours of group today.     The group topic was Thinking, patient was responsive to topic.     Patient's engagement in the group session: high     Total group size: 7    Pt reports improvement from last week, though presented notably manic throughout course of group. She indicated having scheduled a psychiatric appointment for sometime in July though also stated she'd recently had her medications adjusted.     Jignesh Clark [facility], [facility]  5/26/2020, 10:19 PM

## 2021-06-08 NOTE — PROGRESS NOTES
Telemedicine Visit: The patient's condition can be safely assessed and treated via synchronous audio and visual telemedicine encounter. Pt experienced difficulty getting the [employer] Touchpoint bang to work on her phone and so session was completed via telephone.     Reason for Telemedicine Visit: Services only offered telehealth    Originating Site (Patient Location): Patient's home    Distant Site (Provider Location): Provider Remote Setting    Consent:  The patient/guardian has verbally consented to: the potential risks and benefits of telemedicine (video visit) versus in person care; bill my insurance or make self-payment for services provided; and responsibility for payment of non-covered services.     Mode of Communication:  Video Conference via [employer]    Call Started at: 11:15 AM  Call Ended at: 11:50 AM    As the provider I attest to compliance with applicable laws and regulations related to telemedicine.

## 2021-06-08 NOTE — TELEPHONE ENCOUNTER
"D: Called to speak with Pt for f/u re he recent self-report of increased amina. Call lasted 20 minutes.    A: Pt presented as highly manic during call and endorsed symptoms of hypomania including dramatically increased energy, distractibility and difficulty concentrating. She noted her PCP had adjusted her dosage of Abilify in recent weeks and that she is currently waiting to  a prescription for Klonopin from the [facility] pharmacy in [place]. She noted feeling \"great\" though also expressed understanding that, per her history, she will almost certainly crash into significant depression if she continues to allow her hypomania to go unchecked. She indicated believing the Klonopin will work though is uncertain she will be able to get a ride to the pharmacy. She reports that her truck broke down and alluded to it having been towed in [place]. She did not wish to brainstorm with counselor around alternative means to get to the pharmacy to  her prescription. She denies any recent chemical use and notes she has been occupying herself with gardening.     R: Pt endorses symptoms of hypomanic which she has reportedly attempted to address through her PCP. She acknowledges her difficulty maintaining a conversation at this time and [facility]. She has been instructed to email counselor this afternoon with notice of whether she has been able to  her medication and will be called by counselor tomorrow at 12pm to check-in. Pt is currently scheduled to complete programming on 6/9 and understands she is expected to submit a UA at  prior to that date.    T: Counselor will maintain contact with Pt and offer assistance around her obtaining her medications.   "

## 2021-06-08 NOTE — PROGRESS NOTES
Weekly Progress Note     Week of 5/25/20-5/29/20  Jaylin Easley  1970  [id number]      D) Pt attended 1 group via [employer] this week with 0 absences. Patient participated in 0 individual counseling sessions via [employer] this week with 1 absence, reportedly for oversleeping. A) Staff facilitated groups and reviewed tx progress. Assessed for [facility]. R) No VAP needed at this time.     Any significant events, defines as events that impact patients relationship with others inside and outside of treatment: No.     Indicate any changes or monitoring of physical or mental health problems: Pt had reported last week that she was beginning to cycle hypomanic and displayed behavior consistent with amina during group on 5/26. She reported having adjusted her medications though also noted having a psychiatric appointment in July.     Indicate involvement by any outside supports: Yes.    IAPP reviewed and modified as needed. NA  Pt working on the following dimensions:    Dimension #1 - Withdrawal Potential - Risk 0. No current concerns; Pt denies chemical use over the past week. UA on 5/8 screened NEG for all substances.   Specific goals from treatment plan addressed this week:  Patient to maintain abstinence throughout outpatient treatment.   Effectiveness of strategies:  Strategies appear to be ineffective at this time.     Dimension #2 - Biomedical - Risk 1. Pt reports the following biomedical conditions: psoriasis, psoriatic arthritis, and COPD.  Specific goals from treatment plan addressed this week:  Patient to maintain stable health throughout outpatient treatment.   Effectiveness of strategies:  Strategies appear to be effective.    Dimension #3 - Emotional/Behavioral/Cognitive - Risk 2. Pt presented hypomanic during group on 5/26 as evidenced by heightened and excitable mood, difficulty maintaining focus, a pronounced increase in peer feedback, and self-report. She reported having had her medications  adjusted though also noted an upcoming medication management appointment with a psychiatrist in July. She reports continuing AA engagement via [employer] and notes that gardening grounds her.   Active interventions to stabilize mental health symptoms:  Pt provided seemingly contradicting information around medication.   Specific goals from treatment plan addressed this week:  Establish and maintain mental and emotional health.  Effectiveness of strategies:  Strategies appear to be questionable at this time.    Dimension #4 - Treatment Acceptance/Resistance - Risk 2. Pt reportedly slept through a scheduled individual counseling session that had been scheduled less than 24 hours prior to the appointment time.   Specific goals from treatment plan addressed this week:  Patient to increase motivation towards recovery by participating in outpatient programming.   Effectiveness of strategies:  Strategies appear to be ineffective at this time.    Dimension #5 - Relapse Potential - Risk 3. Pt denies any chemical use over the past week. She has not yet enlisted an AA sponsor. She reportedly attended two internet-based recovery support group meetings which she noted finding helpful and plans to continue. She appears to be cycling hypomanic which may increase chances of relapse, especially if she finds methamphetamine to have a grounding effect. She missed a scheduled individual counseling session on 5/28.   Specific goals from treatment plan addressed this week:  Develop and utilize practical, effective relapse-prevention strategies and tools.   Effectiveness of strategies:  Strategies appear to be effective at this time.    Dimension #6 - Recovery Environment - Risk 2. Pt reports having had her medication adjusted through her PCP and reportedly has an appointment to see a psychiatric in late July. She is on probation in [facility] [place] through 2/25/26. Pt reports having some friends who support her recovery. She reportedly attended  two internet-based recovery support group meetings which she noted finding helpful and plans to continue.   Specific goals from treatment plan addressed this week:  Establish and engage a widespread, redundant recovery support network.   Effectiveness of strategies:  Strategies appear to be effective.    T) Treatment plan updated No.  Patient notified and in agreement NA.    Patient was educated on Thinking. Patient has completed 115 program hours at this time.     Projected discharge date is 6/9/19. Current discharge plan is PENDING.     Jignesh Clark, MSW, [facility], [facility]  5/28/2020, 9:55 PM      Weekly Educational Topics Date   1. Understanding Dual Diagnoses, week 1 1/20/20; 1/22/20; 1/24/20;   2. Understanding Dual Diagnoses, week 2 1/27/20; 1/31/20; 5/6/20;    3. Stress Management 2/3/20; 2/7/20; 5/12/20;   4. Feelings/Emotions 2/10/20; 2/14/20;    5. Thinking 11/27/19; 2/17/20; 2/21/20; 2/26/20;   6. Building Recovery Support 12/2/19; 12/4/19; 2/24/20; 2/28/20;    7. Developing Assertive Communication Skills 12/9/19; 12/11/19; 12/13/19; 3/3/20;    8. Relapse Prevention 12/16/19; 12/20/19; 3/10/20;   9. Relationships/Boundaries 12/23/19; 12/27/19;   10. Grief and Loss 12/30/19; 1/3/20; 4/7/20;   11. Strengths 1/6/20; 1/8/20; 1/10/20; 4/14/20;   12. Wellness 1/13/20; 1/15/20; 1/17/20; 4/21/20;

## 2021-06-08 NOTE — PROGRESS NOTES
Jaylin Easley attended 2 hours of Phase 3 group on 05/19/2020.     The group topic was Recovery Support, patient was responsive to topic.     Patient's engagement in the group session: high     Total number of patients present 7.     Counselor(s) present: MELVI Breen    Supervising MD: MELVI Panda  5/19/2020, 1:18 PM

## 2021-06-08 NOTE — PROGRESS NOTES
Telemedicine Visit: The patient's condition can be safely assessed and treated via synchronous audio and visual telemedicine encounter.      Reason for Telemedicine Visit: Services only offered telehealth    Originating Site (Patient Location): Patient's home    Distant Site (Provider Location): Provider Remote Setting    Consent:  The patient/guardian has verbally consented to: the potential risks and benefits of telemedicine (video visit) versus in person care; bill my insurance or make self-payment for services provided; and responsibility for payment of non-covered services.     Mode of Communication:  Video Conference via [employer]    Call Started at: 9:30 AM  Call Ended at: 12:10 PM    As the provider I attest to compliance with applicable laws and regulations related to telemedicine.

## 2021-06-08 NOTE — TELEPHONE ENCOUNTER
Pt did not answer for scheduled 12pm individual counseling session by telephone. Counselor left  notifying Pt she is now two weeks delinquent in submitting a UA and continues to miss scheduled telephone calls with counselor. Pt will likely be discharged from programming at the end of this week. She can complete with staff approval if she submits a negative UA at any [facility] that provides that service and speaks with counselor once to confirm the status of her most recent hypomanic episode. If Pt fails to respond to counselor she will likely be discharged AWOL.

## 2021-06-08 NOTE — TELEPHONE ENCOUNTER
Pt had emailed a new phone number and directed this writer to contact her this afternoon. Pt answered and sounded comparatively lucid to prior recent conversations. She noted some continuing, though lessening, amina symptoms and indicated she believed she was cycling down. She denied any chemical use. She indicated she would be able to provide a UA as directed by Friday, 6/19/20. Pt understands that failure to do so will result in her incomplete discharge from [facility].

## 2021-06-08 NOTE — TELEPHONE ENCOUNTER
Pt had not communicated with counselor since 6/1 (note in chart). She sent the following email this morning at 8:53am: Barry Egan got ur message...   All is good in the world and Latoya...Thanks. Counselor emailed back requesting a telephone conversation at 12pm, though Pt did not answer and her VM was full. Counselor subsequently emailed Pt reminding her we do need to speak and she does need to submit a UA at  before a final determination will be made around her completing treatment, tentatively scheduled for 6/9/20. Counselor will again attempt to contact Pt on 6/8.

## 2021-06-08 NOTE — PROGRESS NOTES
Telemedicine Visit: The patient's condition can be safely assessed and treated via synchronous audio and visual telemedicine encounter.      Reason for Telemedicine Visit: Services only offered telehealth    Originating Site (Patient Location): Patient's home    Distant Site (Provider Location): Provider Remote Setting    Consent:  The patient/guardian has verbally consented to: the potential risks and benefits of telemedicine (video visit) versus in person care; bill my insurance or make self-payment for services provided; and responsibility for payment of non-covered services.     Mode of Communication:  Video Conference via [employer]    Call Started at: 10:30 AM  Call Ended at: 11:25 AM    As the provider I attest to compliance with applicable laws and regulations related to telemedicine.

## 2021-06-08 NOTE — TELEPHONE ENCOUNTER
RN Triage:  PCP in [facility].    Spoke with 50 yr old Jaylin who c/o:    Dry throat  Thirst  Cough; started out as dry cough and has productive cough x the last week.  Hoarse voice  Sweaty  Chills   Body aches  Denies fever.  Shortness of breath with activity only.  Hx of COPD and emphysema    Symptoms x 6 weeks.    PLAN:  Advised pt to contact PCP regarding symptoms or go to [email or web address] for virtual visit.  Pt intends to contact her PCP in [place].    Chandrika Clarke RN   [facility] RN Triage      Reason for Disposition    MILD difficulty breathing (e.g., minimal/no SOB at rest, SOB with walking, pulse <100)    Additional Information    Negative: SEVERE difficulty breathing (e.g., struggling for each breath, speaks in single words)    Negative: Difficult to awaken or acting confused (e.g., disoriented, slurred speech)    Negative: Bluish (or gray) lips or face now    Negative: Shock suspected (e.g., cold/pale/clammy skin, too weak to stand, low BP, rapid pulse)    Negative: Sounds like a life-threatening emergency to the triager    Negative: [1] COVID-19 exposure AND [2] no symptoms    Negative: COVID-19 and Breastfeeding, questions about    Negative: [1] Adult with possible COVID-19 symptoms AND [2] triager concerned about severity of symptoms or other causes    Negative: SEVERE or constant chest pain or pressure (Exception: mild central chest pain, present only when coughing)    Negative: MODERATE difficulty breathing (e.g., speaks in phrases, SOB even at rest, pulse 100-120)    Negative: Patient sounds very sick or weak to the triager     [facility] [place] Specific Disposition  - [facility] Specific Patient Instructions  COVID 19 Nurse Triage Plan/Patient Instructions    Please be aware that novel coronavirus (COVID-19) may be circulating in the community. If you develop symptoms such as fever, cough, or SOB or if you have concerns about the presence of another infection including coronavirus (COVID-19),  please contact your health care provider or visit [email or web address].       Patient to schedule a Virtual Visit with provider. Reference Visit Selection Guide.    Thank you for taking steps to prevent the spread of this virus.  Limit your contact with others.  Wear a simple mask to cover your cough.  Wash your hands well and often.    Resources  [facility]: About COVID-19: [email or web address]  CDC: What to Do If You're Sick: www.cdc.gov/coronavirus/2019-ncov/about/steps-when-sick.html  CDC: Ending Home Isolation: www.cdc.gov/coronavirus/2019-ncov/hcp/disposition-in-home-patients.html   CDC: Caring for Someone: www.cdc.gov/coronavirus/2019-ncov/if-you-are-sick/care-for-someone.html   [facility]: Interim Guidance for Hospital Discharge to Home: www.[facility]./diseases/coronavirus/hcp/hospdischarge.pdf  [facility] clinical trials (COVID-19 research studies): clinicalaffairs.[facility]-clinical-trials   Below are the COVID-19 hotlines at the [place] [facility] ([facility]). Interpreters are available.   For health questions: Call 065-065-4068 or 1-908.107.5283 (7 a.m. to 7 p.m.)  For questions about schools and childcare: Call 725-952-6755 or 1-754.293.9599 (7 a.m. to 7 p.m.)    Protocols used: CORONAVIRUS (COVID-19) DIAGNOSED OR [id number]-Z-DE 5.16.20

## 2021-06-08 NOTE — TELEPHONE ENCOUNTER
Left VM for Pt notifying her she must submit a UA as directed at [facility] prior to completing [facility] IOP, initially scheduled for tomorrow, 6/9. Counselor also needs to have a conversation with her via phone or video to screenffg for current symptoms of hypomania. Pt was asked to contact counselor with a time that works for her to have such a conversation.

## 2021-06-08 NOTE — PROGRESS NOTES
Jaylin Easley attended 1 hour of group today.     The group topic was Stress Management, patient was responsive to topic.     Patient's engagement in the group session: high     Total group size: 5      LINDA Cervantes, [facility]  5/12/2020, 3:07 PM

## 2021-06-08 NOTE — TELEPHONE ENCOUNTER
Pt did not present for scheduled individual counseling session today. VM was left requesting she contact counselor and that we speak prior to her next scheduled [facility] IOP group on 6/2/20.

## 2021-06-08 NOTE — PROGRESS NOTES
Weekly Progress Note     Week of 5/11/20-5/15/20  Jaylin Easley  1970  [id number]      D) Pt attended 1 group via [email or web address] this week with 0 absences. Patient participated in 0 individual counseling sessions via [employer] this week. A) Staff facilitated groups and reviewed tx progress. Assessed for [facility]. R) No VAP needed at this time.     Any significant events, defines as events that impact patients relationship with others inside and outside of treatment: No.     Indicate any changes or monitoring of physical or mental health problems No    Indicate involvement by any outside supports: No.    IAPP reviewed and modified as needed. NA  Pt working on the following dimensions:    Dimension #1 - Withdrawal Potential - Risk 0. No current concerns; Pt denies chemical use over the past week. UA on 5/8 screened NEG for all substances.   Specific goals from treatment plan addressed this week:  Patient to maintain abstinence throughout outpatient treatment.   Effectiveness of strategies:  Strategies appear to be ineffective at this time.     Dimension #2 - Biomedical - Risk 1. Pt reports the following biomedical conditions: psoriasis, psoriatic arthritis, and COPD.  Specific goals from treatment plan addressed this week:  Patient to maintain stable health throughout outpatient treatment.   Effectiveness of strategies:  Strategies appear to be effective.    Dimension #3 - Emotional/Behavioral/Cognitive - Risk 1. Pt reports continuing though decreased symptoms of mild depression which she attributes to increased time at home due to the pandemic.She has noted the forced sheltering-in-place having a deleterious effect on her though she did report attending two online AA meetings which she noted finding very helpful. She has also been continuing to spend more time outside gardening which she reports is therapeutic. She also notes having spent time with an adult son which she enjoys.  Active interventions to  stabilize mental health symptoms:  Ppt denies any specific interventions at this time.   Specific goals from treatment plan addressed this week:  Establish and maintain mental and emotional health.  Effectiveness of strategies:  Strategies appear to be effective.    Dimension #4 - Treatment Acceptance/Resistance - Risk 0. No current concerns.  Specific goals from treatment plan addressed this week:  Patient to increase motivation towards recovery by participating in outpatient programming.   Effectiveness of strategies:  Strategies appear to be ineffective at this time.    Dimension #5 - Relapse Potential - Risk 1. Pt denies any chemical use over the past week. She has not yet enlisted an AA sponsor. She reportedly attended two internet-based recovery support group meetings which she noted finding helpful and plans to continue.  Specific goals from treatment plan addressed this week:  Develop and utilize practical, effective relapse-prevention strategies and tools.   Effectiveness of strategies:  Strategies appear to be effective at this time.    Dimension #6 - Recovery Environment - Risk 2. Pt receives medication management through her PCP. She is on probation in [facility] through 2/25/26. Pt reports having some friends who support her recovery. She reportedly attended two internet-based recovery support group meetings which she noted finding helpful and plans to continue. She reports positive contact with her adult son.  Specific goals from treatment plan addressed this week:  Establish and engage a widespread, redundant recovery support network.   Effectiveness of strategies:  Strategies appear to be effective.    T) Treatment plan updated No.  Patient notified and in agreement NA.    Patient has completed 111 of 115 program hours at this time.     Projected discharge date is 6/9/19. Current discharge plan is PENDING.     DARCIE Garcia, [facility], [facility]  5/15/2020, 10:58 PM      Weekly Educational Topics Date    1. Understanding Dual Diagnoses, week 1 1/20/20; 1/22/20; 1/24/20;   2. Understanding Dual Diagnoses, week 2 1/27/20; 1/31/20; 5/6/20;    3. Stress Management 2/3/20; 2/7/20; 5/12/20;   4. Feelings/Emotions 2/10/20; 2/14/20;    5. Thinking 11/27/19; 2/17/20; 2/21/20;    6. Building Recovery Support 12/2/19; 12/4/19; 2/24/20; 2/28/20;    7. Developing Assertive Communication Skills 12/9/19; 12/11/19; 12/13/19; 3/3/20;    8. Relapse Prevention 12/16/19; 12/20/19; 3/10/20;   9. Relationships/Boundaries 12/23/19; 12/27/19;   10. Grief and Loss 12/30/19; 1/3/20; 4/7/20;   11. Strengths 1/6/20; 1/8/20; 1/10/20; 4/14/20;   12. Wellness 1/13/20; 1/15/20; 1/17/20; 4/21/20;

## 2021-06-08 NOTE — PROGRESS NOTES
Weekly Progress Note     Week of 5/4/20-5/8/20  Jaylin Easley  1970  [id number]      D) Pt attended 1 group via [employer] this week with 1 absence (which she made up) for unspecified reasons. Patient participated in 1 individual counseling session via [employer] this week. A) Staff facilitated groups and reviewed tx progress. Assessed for [facility]. R) No VAP needed at this time.     Any significant events, defines as events that impact patients relationship with others inside and outside of treatment: No.     Indicate any changes or monitoring of physical or mental health problems No    Indicate involvement by any outside supports: No.    IAPP reviewed and modified as needed. NA  Pt working on the following dimensions:    Dimension #1 - Withdrawal Potential - Risk 0. No current concerns; Pt denies chemical use over the past week. UA on 5/8 screened NEG for all substances.   Specific goals from treatment plan addressed this week:  Patient to maintain abstinence throughout outpatient treatment.   Effectiveness of strategies:  Strategies appear to be ineffective at this time.     Dimension #2 - Biomedical - Risk 1. Pt reports the following biomedical conditions: psoriasis, psoriatic arthritis, and COPD.  Specific goals from treatment plan addressed this week:  Patient to maintain stable health throughout outpatient treatment.   Effectiveness of strategies:  Strategies appear to be effective.    Dimension #3 - Emotional/Behavioral/Cognitive - Risk 1. Pt reports continuing though decreased symptoms of mild depression which she attributes to increased time at home due to the pandemic.She has noted the forced sheltering-in-place having a deleterious effect on her though she did report attending two online AA meetings which she noted finding very helpful. She has also been spending more time outside gardening which she reports is therapeutic. She also notes having spent time with an adult son which she  enjoys.  Active interventions to stabilize mental health symptoms:  Ppt denies any specific interventions at this time.   Specific goals from treatment plan addressed this week:  Establish and maintain mental and emotional health.  Effectiveness of strategies:  Strategies appear to be effective.    Dimension #4 - Treatment Acceptance/Resistance - Risk 0. No current concerns.  Specific goals from treatment plan addressed this week:  Patient to increase motivation towards recovery by participating in outpatient programming.   Effectiveness of strategies:  Strategies appear to be ineffective at this time.    Dimension #5 - Relapse Potential - Risk 1. Pt denies any chemical use over the past week. She has not yet enlisted an AA sponsor. She reportedly attended two internet-based recovery support group meetings which she noted finding helpful and plans to continue.  Specific goals from treatment plan addressed this week:  Develop and utilize practical, effective relapse-prevention strategies and tools.   Effectiveness of strategies:  Strategies appear to be effective at this time.    Dimension #6 - Recovery Environment - Risk 2. Pt receives medication management through her PCP. She is on probation in [facility] [place] through 2/25/26. Pt reports having some friends who support her recovery. She reportedly attended two internet-based recovery support group meetings which she noted finding helpful and plans to continue. She reports positive contact with her adult son.  Specific goals from treatment plan addressed this week:  Establish and engage a widespread, redundant recovery support network.   Effectiveness of strategies:  Strategies appear to be effective.    T) Treatment plan updated No.  Patient notified and in agreement NA.    Patient has completed 110 of 115 program hours at this time.     Projected discharge date is 6/2/19. Current discharge plan is PENDING.     DARCIE aGrcia, [facility]  5/8/2020, 10:05  PM      Weekly Educational Topics Date   1. Understanding Dual Diagnoses, week 1 1/20/20; 1/22/20; 1/24/20;   2. Understanding Dual Diagnoses, week 2 1/27/20; 1/31/20; 5/6/20;    3. Stress Management 2/3/20; 2/7/20;    4. Feelings/Emotions 2/10/20; 2/14/20;    5. Thinking 11/27/19; 2/17/20; 2/21/20;    6. Building Recovery Support 12/2/19; 12/4/19; 2/24/20; 2/28/20;    7. Developing Assertive Communication Skills 12/9/19; 12/11/19; 12/13/19; 3/3/20;    8. Relapse Prevention 12/16/19; 12/20/19; 3/10/20;   9. Relationships/Boundaries 12/23/19; 12/27/19;   10. Grief and Loss 12/30/19; 1/3/20; 4/7/20;   11. Strengths 1/6/20; 1/8/20; 1/10/20; 4/14/20;   12. Wellness 1/13/20; 1/15/20; 1/17/20; 4/21/20;
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
## 2021-06-10 ENCOUNTER — COMMUNICATION - HEALTHEAST (OUTPATIENT)
Dept: BEHAVIORAL HEALTH | Facility: CLINIC | Age: 51
End: 2021-06-10

## 2021-06-11 NOTE — PROGRESS NOTES
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 up, take hit, start the day to accomplish things, using throughout the entire day.     2B. How often do you spend more time than you planned using or use more than you planned? (DSM)     Would use more than planned about half the time.     3. How important is using to your social connections? Do many of your family or friends use?     Family- not important.   Friends- using friends it is very important  Other friends, not important.     4A. Are you currently in a significant relationship?  yes  4B.  If yes, how long?  1.5 years.  4C. Sexual Orientation:  heterosexual    5A. Who do you live with? Significant other, and others that live in the house.  5B. Tell me about their alcohol/drug use and mental health issues. He uses, but is quitting, no diagnosed mental health.  5C. Are you concerned for your safety there? Yes- due to others that live there that shouldn't be there.   5D. Are you concerned about the safety of anyone else who lives with you? no    6A. Do you have children who live with you? no  If the person lives with children, ask follow-up questions to determine the person's relationship and responsibility, both legal and care giving; and what  arrangements are made for supervision for the children when the person is not available.      6B. Do you have children who do not live with you?  yes, Explain:  sons (18, 30)   If yes, ask follow up questions to learn where the children are, who has custody and what the person't relaltionship and responsibility is with these children and what hopes the person has for his or her future with these children.    7A. Who supports you in making changes in your alcohol or drug use? What are they willing to do to support you? Who is upset or angry about you making changes in your alcohol or drug use? How big a problem is this for you?      Kids, boyfriend, probation, sober friends      7B. This table is provided to record information about the person s relationships and available support It is not necessary to ask each item; only to get a comprehensive picture of their support system.  How often can you count on the following people when you need someone?   Partner / Spouse Always supportivewilling to stop   Parent(s)/Aunt(s)/Uncle(s)/Grandparents Usually supportive   Sibling(s)/Cousin(s) Usually supportive   Child(ab) Always supportive   Other relative(s)    Friend(s)/neighbor(s) Always supportive   Child(ab) s father(s)/mother(s)    Support group member(s)    Community of senait members Always supportive   /counselor/therapist/healer Always supportive   Other (specify) Probation  Always supportive     8A. What is your current living situation?     Living with boyfriend most the time, but is not supposed to be there so always has her bag pack ready to leave if she has to.     8B. What is your long term plan for where you will be living?    [place]   Is waiting for something to open up in the near future for somewhere of her own.     8C. Tell me about your living environment/neighborhood? Ask enough follow up questions to determine safety, criminal activity,  availability of alcohol and drugs, supportive or antagonistic to the person making changes.      Safe, no concerns at this time.     9. Criminal justice history: Gather current/recent history and any significant history related to substance use--Arrests? Convictions? Circumstances? Alcohol or drug involvement? Sentences? Still on probation or parole? Expectations of the court? Current court order? Any sex offenses - lifetime? What level? (DSM)    Probation.   Possession x2, check forgery, 1 DUI    10. What obstacles exist to participating in treatment? (Time off work, childcare, funding, transportation, pending senior living time, living situation)    Transportation, funding    Dimension VI Ratings   Recovery environment - The placing authority must use the criteria in Dimension VI to determine a client s recovery environment.   RISK DESCRIPTIONS - Severity rating: 3  Client is not engaged in structured, meaningful activity and the client's peers, family , significant other, and living environment are unsupportive, or there is significant criminal justice system involvement.    REASONS SEVERITY WAS ASSIGNED - (What support does the person have for making changes? What structure/stability does the person have in his or her daily life that willincrease the likelihood that changes can be sustained? What problems exist in the person s environment that will jeopardize getting/staying clean and sober?)     Client reports that she is currently retired, and has been for about the past 5 years. This may indicate that the client lacks structured activity. Client was able to identify some hobbies, and so appears to have some meaningful activity for her life, but it is unclear how often she engages in those activities while sober. Client reports that she does have some friends and family that are supportive of her recovery, but that she does have friends who use, and she states that her significant other is currently using as well, and  so he does not appear to be supportive of her recovery. Client states that she is currently staying with her significant other, and that it is not the most supportive of her recovery. Client is currently on probation [place] 2 counties, and reports other past legal involvement as well.                Client Choice/Exceptions     Would you like services specific to language, age, gender, culture, Jehovah's witness preference, race, ethnicity, sexual orientation or disability?  no    If yes, specify:      What particular treatment choices and options would you like to have?  Outpatient, days or evenings    Do you have a preference for a particular treatment program?  Unsure.         DSM-V Criteria for Substance Abuse  Instructions  Determine whether the client currently meets the criteria for a Substance Use Disorder using the diagnostic criteria in the DSM-V, pp. 481-589. Current means during the most recent 12 months outside a facility that controls access to substances.    Category of substance Severity ICD-10 Code/DSM V Code   []  Alcohol Use Disorder [] Mild  [] Moderate  [] Severe [] (F10.10) (305.00)  [] (F10.20) (303.90)  [] (F10.20) (303.90)   []  Cannabis Use Disorder [] Mild  [] Moderate  [] Severe [] (F12.10) (305.20)  [] (F12.20) (304.30)  [] (F12.20) (304.30)   [] Hallucinogen Use Disorder [] Mild  [] Moderate  [] Severe [] (F16.10) (305.30)  [] (F16.20) (304.50)  [] (F16.20) (304.50)   []  Inhalant Use Disorder [] Mild  [] Moderate  [] Severe [] (F18.10) (305.90)  [] (F18.20) (304.60)  [] (F18.20) (304.60)   []  Opioid Use Disorder [] Mild  [] Moderate  [] Severe [] (F11.10) (305.50)  [] (F11.20) (304.00)  [] (F11.20) (304.00)   []  Sedative, Hypnotic, or Anxiolytic Use Disorder [] Mild  [] Moderate  [] Severe [] (F13.10) (305.40)   [] (F13.20) (304.10)  [] (F13.20) (304.10)   [x]  Stimulant Related Disorders [] Mild  [] Moderate  [x] Severe [] (F15.10) (305.70) Amphetamine type substance  [] (F14.10) (305.60)  Cocaine  [] (F15.10) (305.70) Other or unspecified stimulant  [] (F15.20) (304.40) Amphetamine type substance  [] (F14.20) (304.20) Cocaine  [] (F15.20) (304.40) Other or unspecified stimulant  [x] (F15.20) (304.40) Amphetamine type substance  [] (F14.20) (304.20) Cocaine  [] (F15.20) (304.40) Other or unspecified stimulant   []  Tobacco use Disorder [] Mild  [] Moderate  [] Severe [] (Z72.0) (305.1)  [] (F17.200) (305.1)  [] (F17.200) (305.1)   []  Other (or unknown) Substance Use Disorder [] Mild  [] Moderate  [] Severe [] (F19.10) (305.90)  [] (F19.20) (304.90)  [] (F19.20) (304.90)       Suggested Level of Care Necessary for Recovery  []  Inpatient  []  Extended Care []  Residential [x]  Outpatient  []  None            Collateral Contact Summary   Number of contacts made:  2  Contact with referring person:  yes     If court related records were reviewed, summarize here:  NA     [x]   Information from collateral contacts supported/largely agreed with information from the client and associated risk ratings.   []   Information from collateral contacts was significantly different from information from the client and lead to different risk ratings.      Summarize new information here:      Rule 25 Assessment Summary and Plan   's Recommendation    Based on the information gathered in this assessment and from collateral information, the client meets DSM IV criteria for Stimulant Use Disorder, Amphetamine Type, Severe (F15.20).   Client appears appropriate for outpatient treatment at this time, and may benefit from an MICD program to help focus on her mental health as well.   Client should abstain from use of all mood altering substances.   Client should comply with conditions of probation.   This assessment can be updated with additional information within a 6 month period.      Collateral Contacts     Please duplicate this page for each contact.  If this includes information which is sensitive and not public,  separate this page from the rest of the assessment before sharing.  Retain the page in the assessment file.   Name    Sharyn Bacon Relationship    [facility]  Phone Number    [phone] Releases    yes       Information Provided:      Sharyn reported the following:    Client has really struggled to stay clean.   She was living in [place] with her significant other, and could not stay clean there.   When she last spoke to the client, her significant other was trying to stay clean with her, but Sharyn does not believe that he is actually doing so.   Client is now living with her mother, which she believes is better for her. Client has been living there for less than 1 month now.   Client had signed a lease in [facility], but it fell through.   Since that fell through, the client told her that she went to her mother's house.   Client last relapsed in early may 2017, and admitted to use of meth on 5/1/17.   Client does see her when she is supposed to.   Client reported in the past that she as using 2x per week at that time.   She has had the client on supervision for only about 6 months.   Client has been good about seeing her, and following through for the past 2 months, since she went to nursing home and had a warrant resolved.       Collateral Contacts     Name    Bella Adame   Relationship    [facility]  Phone Number    [phone] Releases    yes       Information Provided:      Bella called to verify that the client had had this assessment, and to provide collateral.   Attempted to call Bella 6/21/17 @ 3:10pm. No answer, left message to return call with any information.     Bella reported the following:    She is concerned as the client is currently bordering on a probation violation for being noncompliant.   She has a history of not showing up for UAs, or is positive when she does show up.   Client seems to have trouble with transportation, and so she expressed concern  about the client's attendance if recommended to treatment.   Getting places has been a real issue for the client in the past due to unreliable transportation.   Client has been in denial about her use.   When client was picked up for possession of meth, she denies any use prior to that.   Client tested positive for her first UA on 3/21/17. Client was supposed to be submitting UAs since December 2016, but did not report until March 2017.   Client has been reporting since then that she can't get places, but that she is clean.   Client continues to test positive despite her claims of being clean.   Client had a dirty UA on 6/2/17.   Client has been to treatment in the past in 2005 and the most recent was in 2009.       Staff Name and Title:  MELVI Driver    Date:  6/15/2017  Time:  2:03 PM
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
## 2021-06-12 NOTE — PROGRESS NOTES
Jaylin Easley attended 3 hours of group on 11/09/2020.     The group topic was Relapse Prevention, patient was responsive to topic.     Patient's engagement in the group session: medium     Total number of patients present 9.     Counselor(s) present: JESSICA Mcintosh, MELVI and MELVI Berman    Supervising MD: Dr. Vanessa Garcia, JESSICA, MELVI  11/9/2020, 1:57 PM

## 2021-06-12 NOTE — PROGRESS NOTES
Telemedicine Visit: The patient's condition can be safely assessed and treated via synchronous audio and visual telemedicine encounter.      Reason for Telemedicine Visit: In-person services limited due to Covid-19.     Originating Site (Patient Location): Patient's home    Distant Site (Provider Location): Provider Remote Setting- Home Office    Consent:  The patient/guardian has verbally consented to: the potential risks and benefits of telemedicine (video visit) versus in person care; bill my insurance or make self-payment for services provided; and responsibility for payment of non-covered services.     Mode of Communication:  Video Conference via Zoom    Call Started at: 9:00 AM  Call Ended at: 12:00 PM    As the provider I attest to compliance with applicable laws and regulations related to telemedicine.

## 2021-06-12 NOTE — PROGRESS NOTES
Addiction Services - Initial Services Plan      Name:  Jaylin Easley       :  1970        MRN:  [id number]    Goal Methods   1.  Acceptance of chemical dependency and mental illness as a disease. A.  Comply with all med/psych recommendations  B.  Complete preliminary interviews  C.  Attend all program functions  D.  Attend all individual counseling sessions  E.  Read all assigned literature  F.  Complete psychological testing as assigned  G.  Particpate in any necessary consultations     2.  Acceptance of my need and ability to change A.  Complete written workbook assignments on [facility]  B.  Participate in conferences (P.O., , family)  C.  Participate in 12 Step/support groups if desired  D.  Actively participate in treatment planning and reviews  E.  Complete all assignments given or recommended on Treatment Plan  F.  Present Drug History/Peer Assessment with peer group  G.  Complete 10th Step Inventory daily   3.  Acceptance of staff recommendations as a means to my recovery A.  Participate in all interviews for Continuum of Care Plans  B.  Familiarize self with 12 Step Recovery Program and how this applies to your day-to-day behavior  C.  Demonstrate a working knowledge of AA steps of recovery  D.  Obtain a sponsor if desired     Patient describes their immediate need:  Housing, supplemental insurance  Are there any immediate Safety Needs such as (physical, stability, mobility):  None    Immediate Health Needs and Plan:    None  Vulnerable Adult:  No    [x] Continue Current Medications for: bipolar disorder   [] Request Consult for:  [] Notify Attending Physician about:  [] Other:      Issues to be addressed in the first sessions:    Become acquainted with group norms and other group members.   Set up time to meet 1:1 with primary counselor.     Patient strengths and needs:  Strengths: sociable, caring, endearing, hard working, compassionate.     Needs: being a really good friend, bettering  self, social skills- conversational skills.     Plan for patient for time between intake and completion of the treatment plan:  Remain abstinent from any illicit substance/alcohol use, and start group on 7/25/17.   Participate in all treatment activities and assignments.     Staff Members' Titles authorized to Initiate Services are:    Director of Behavioral Service    Clinical Director of Chemical Dependency    Primary Counselor    MI/CHASITY Mcbride. Counselor        Nursing Staff    Vulnerable Adult Review  [x] Review of the facility Abuse Prevention plan was reviewed with the patient  [x] No individual abuse plan is necessary  [] In addition to the facility Abuse Prevention plan, an Individual Abuse Plan will be put in place    I understand these goals to be the Treatment Goals of the Program, and I agree to the stated Methods in attempting to accomplish these goals.    Patient Signature:  _________________________Date:  7/24/2017      Staff Name/Title:  MELVI Driver    Date:  7/24/2017  Time: 10:26 AM
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
## 2021-06-12 NOTE — PROGRESS NOTES
D) Jaylin is a 47 y.o. y.o. White or  female who is referred to [facility] OP via Probation with funding from Medicare.  Patient orientated x3.  Currently meets criteria for Stimulant Use Disorder, Amphetamine Type, Severe (F15.20).  Patient appears appropriate for [facility] OP at this time.    A) Completed updated intake assessment; preliminary paperwork; presented DAMARTY, Mike, scoutievance procedure, Vulnerable Adult (VA) policy, TB & HIV/AIDS info and resources, confidentiality & HIPAA policies, Facility Abuse Prevention Plan, group rules/expectations, Patient Bill of Rights, counselor & supervisor license number and contact info, and PANSI.  Patient given statement of patient rights & responsibilities.  Conducted VA Assessment.    R) No special VA plan needed at this time.  PANSI score:  High Risk.  Patient denied suicidal ideation/intent/plan/means at this time.  Patient signed and agreed to ROIs, VA Policy, confidentiality & HIPAA polices, group rules/expectations, and PANSI.    Dimension #1 - Withdrawal Potential - Risk 0, no concern.  Client reports regular use of methamphetamine, using about 3x per week. Client reports that her last use of meth was 7/21/17. Client endorses some minor withdrawal symptoms currently, and reports additional symptoms within the past week. Client does not appear to be at risk of severe withdrawal at this time. Client shows no physical signs or symptoms of intoxication or withdrawal at this time.    Dimension #2 - Biomedical Conditions - Risk 1, mild concern. Client reports health concerns of eczema, COPD, and emphysema. Client reports that she has a PCP whom she sees for her medical concerns.Client appears able to get he medical needs addressed as necessary. Client appears to be in good physical health and functioning at this time.Client does have active insurance at this time, but states that she does not have a supplement for her medicare, and so has been unable to get her  medications filled due to out of pocket cost.    Dimension #3 - Emotional/Behavioral/Cognitive - Risk 2, moderate concern.  Client reports mental health diagnoses of bipolar disorder, manic depressive disorder, anxiety, and PTSD. Client reports that she does not currently have any mental health services, and has been unable to get her medications for the past month due to cost and no longer having supplemental insurance. Client reports passive suicidal thoughts with no plan or intent, and does agree to reach out for help if thoughts progress. Client was provided with crisis intervention options. Client denies any homicidal thoughts or plans. Client is oriented x4.   Dimension #4 - Treatment Acceptance/Resistance - Risk 1, mild concern. Client reports that she was recommended for treatment by her . Client verbalizes a desire to discontinue her use at this time, and to get help to change. It is unclear at this time if client's motivation for treatment is solely due to probation, or if she is internally motivated as well. Client was calm and cooperative throughout this intake.   Dimension #5 - Relapse Potential - Risk 3, serious concern.  Client reports that she has attempted to stop using multiple times, with no success. Client reports 2 past treatments in her life, both of which were over 3 years ago. This may indicate that the client may lack some coping skills to prevent relapse. Client was able to identify some of her triggers, and most of which were related to her mental health. This may indicate that she may be at an increased risk of relapse due to current lack of mental health services. Client may also be at an increased risk of relapse currently due to her current relationship as well as her unhealthy living environment.     Dimension #6 - Recovery Environment - Risk 3, serious concern.  Client reports that she is currently retired, and has been for about the past 5 years. This may indicate  that the client lacks structured activity. Client was able to identify some hobbies, and so appears to have some meaningful activity for her life, but it is unclear how often she engages in those activities while sober. Client reports that she does have some friends and family that are supportive of her recovery, but that she does have friends who use, and she states that her significant other is currently using as well, and so he does not appear to be supportive of her recovery. Client states that she is currently staying between her significant other's and her mother's, and that being with her significant other is not the most supportive of her recovery. Client is currently on probation in 2 counties, and reports other past legal involvement as well.      T) Explained Mike YANES, tony procedure, VA policy, TB & HIV/AIDS info and resources, confidentiality & HIPAA policies, Facility Abuse Prevention Plan, group rules/expectations, Patient Bill of Rights, counselor & supervisor license number and contact info, PANSI.    Patient expected to start group on 7/25/17.      MELVI Driver  7/24/2017, 10:26 AM

## 2021-06-12 NOTE — PROGRESS NOTES
Jaylin Easley attended 3 hours of group therapy today. Today was patient's first day of group. Patient introduced herself and was welcomed by group.     7/25/2017 2:37 PM Angelique Garcia

## 2021-06-12 NOTE — PROGRESS NOTES
Telemedicine Visit: The patient's condition can be safely assessed and treated via synchronous audio and visual telemedicine encounter.      Reason for Telemedicine Visit: In-person services limited due to Covid-19.     Originating Site (Patient Location): Patient's home    Distant Site (Provider Location): Provider Remote Setting- Home Office    Consent:  The patient/guardian has verbally consented to: the potential risks and benefits of telemedicine (video visit) versus in person care; bill my insurance or make self-payment for services provided; and responsibility for payment of non-covered services.     Mode of Communication:  Video Conference via Zoom    Call Started at: 9:00 AM  Call Ended at: 11:50 AM    As the provider I attest to compliance with applicable laws and regulations related to telemedicine.

## 2021-06-12 NOTE — PROGRESS NOTES
Weekly Progress Note  Jaylin Easley  1970  [id number]      D) Patient attended 2 group this week with excused and unexcused absences.  A) Staff facilitated groups and reviewed treatment progress. Assessed for VA.   R) No VAP needed at this time. Pt working on the following dimensions:  Dimension #1 - Withdrawal Potential - Risk 1, mild concerns. Patient reported at intake that her most recent pattern of meth use had been 3x per week and that her last use was on 7/21/17, but then on 7/25/17, she reported using the night before. Patient also reported individually to counselor using on 8/5/17. Patient did not report any use in the past week. Staff to continue to monitor.  Dimension #2 - Biomedical - Risk 1, mild concerns. Patient reports health concerns of eczema, COPD, and emphysema, has a PCP whom she sees for her medical concerns, but is not currently taking medications due to out of pocked costs. Patient reported that she would meeting with someone on 8/3/17 to apply for a supplement, and also wanted to re-establish with a PCP (Method 3).   Dimension #3 - Emotional/Behavioral/Cognitive - Risk 2, moderate concerns. Patient reports history of mental health diagnoses of bipolar, anxiety and PTSD, with no current mental health services. Patient attended appointment for DA on 8/10/17 and it is indicated in patient's chart that she has been referred to psychiatry and psychotherapy (Method 1).   Dimension #4 - Treatment Acceptance/Resistance - Risk 1, mild concerns. Patient reports external motivation from probation, but also reports a desire to work towards recovery. Patient communicated about 1 absence, but another day reported plan to arrive late but did not attend (Method 1). Counselor to follow up with patient about treatment engagement.   Dimension #5 - Relapse Potential - Risk 3, serious concerns. Patient reports date of last use day prior to starting use, and intake indicates patient has attempted to stop  on her own multiple times without success. Patient has a two use since beginning outpatient treatment, staff to continue to monitor, patient may need a higher level of care. Patient reported no use in the past week.   Dimension #6 - Recovery Environment - Risk 3, serious concerns. Patient reports that she is retired, and will be moving in with her mother. Patient reports this week that her significant other has agreed to not use substances. Patient reports probation in 2 counties.  T) Patient educated on Wellness. Patient has completed 15 of 90 hours of program at this time. Projected discharge date is 10/30/17. Current discharge plan is TBD.     JUSTUS Mcintosh, [facility]  8/13/2017, 1:41 PM       Weekly Educational Topics Date Education   1. Understanding Dual Diagnoses, week 1         Understanding Dual Diagnoses, week 2     2. Coping with Stress     3. Managing Difficult Feelings     4. Building Recovery Support     5. Managing Thinking Problems     6. Developing Assertive Communication Skills     7. The Art of Change     8. Relapse Prevention     9. Relationships/Boundaries     10. Grief and Loss 7/25, 7/28    11. Strengths 8/2    12. Wellness 8/7, 8/8    Seeking Safety Unit every Tuesday     Mindfulness every Friday 7/28

## 2021-06-12 NOTE — PROGRESS NOTES
Jaylin Easley attended 3 hours of group on 11/04/2020. Today was patient's first day of group. Patient introduced herself and was welcomed by group.     The group topic was Addiction 101, patient was responsive to topic.     Patient's engagement in the group session: low     Total number of patients present 8.     Counselor(s) present: JESSICA Mcintosh, MELVI and MELVI Green    Supervising MD: JESSICA Lindquist, MELVI  11/4/2020, 4:49 PM

## 2021-06-12 NOTE — PROGRESS NOTES
Jaylin Easley attended 3 hours of group therapy today.    Alice Banegas MA, LMFT, [facility]  8/18/2017, 2:05 PM

## 2021-06-12 NOTE — PROGRESS NOTES
"Mental Health Psychotherapy Telephone Note    Patient: Jaylin Easley    : 1970 MRN: [id number]    Date: 2020 start time: 1:30 PM   stop Time: 1:55 PM   session #3    The patient has been notified of the following:   \"We have found that certain health care needs can be provided without the need for a face to face visit.  This service lets us provide the care you need with a phone conversation.  I will have full access to your [place] medical record during this entire phone call.   I will be taking notes for your medical record. Since this is like an office visit, we will bill your insurance company for this service.  There are potential benefits and risks of telephone visits (e.g. limits to patient confidentiality) that differ from in-person visits.?  Confidentiality still applies for telephone services, and nobody will record the visit.  It is important to be in a quiet, private space that is free of distractions (including cell phone or other devices) during the visit.?? If during the course of the call I believe a telephone visit is not appropriate, you will not be charged for this service\"  Consent has been obtained for this service by care team member: Yes, per verbal agreement   Session Type: Patient is participating in a telemedicine phone visit through [employer]    Chief Complaint   Patient presents with     MH Follow Up     Exploring life stressors       New symptoms or complaints: None reported    Functional Impairment:   Personal: 3  Family: 2  Work: Patient not currently working  Social:1          ASSESSMENT: Current Emotional / Mental Status (status of significant symptoms):              Risk status (Self / Other harm or suicidal ideation)              Patient did not endorse any personal safety concerns              Patient denies current or recent suicidal ideation or behaviors.              Patient denies current or recent homicidal ideation or behaviors.              Patient " denies current or recent self injurious behavior or ideation.              Patient denies other safety concerns.              Patient denied any changes to risk factors              Patient denied any changes to her protective factors              Recommended that patient call 911 or go to the local ED should there be a change in any of these risk factors.                Attitude:                                   Cooperative               Orientation:                             To person, place, and time              Speech                          Rate / Production:       Normal/ Responsive                          Volume:                       Normal               Mood:                                      Normal              Thought Content:                    Clear               Thought Form:                        Coherent  Logical               Insight:                                     Fair       Patient's impression of their current status: Patient shared that she is feeling accomplished by some of the home tasks that she has completed over the past week.  She shared that she is also stressed about court that she has upcoming later this week and that she will be starting an intensive outpatient program that focuses on her chemical use later this week.    Therapist impression of patients current state: Patient presented as being engaged throughout session.  She was able to explore what it means for her to be starting this outpatient program as well as what stresses her about her upcoming court date.  Patient did acknowledge that she is part of herself for being sober for 1 month on Wednesday of this week.    Type of psychotherapeutic technique provided: Client centered    Progress toward short term goals: Treatment plan goals not yet established    Progress as expected as patient was engaged throughout session, Pt discussing concerns and coping strategies during tele-sessions. Pt working on homework  assignments and skills learned in therapy between sessions    Review of long term goals:   Not done at today's visit  Treatment plan to be created at a future session       Diagnosis:  1. Bipolar affective disorder in remission (H)    No change      Plan and Follow up: Patient will begin to build a positive sense of self by acknowledging a positive step that she takes each day.  Patient will continue to take all medications as prescribed and will consult her doctor with any questions or concerns that she may have.  Patient will refrain from all substance use.  Patient and therapist will meet again in 2 weeks.      Discharge Criteria/Planning: Patient will continue with follow-up until therapy can be discontinued without return of signs and symptoms.              I have reviewed the note as documented above.  This accurately captures the substance of my conversation with the patient.  As the provider I attest to compliance with applicable laws and regulations related to telemedicine.  Merly Yates, LICSW

## 2021-06-12 NOTE — PROGRESS NOTES
Patient here today to initiate psychiatric care. States she was on Abilify but can not afford it until her insurance starts I September. States has been on Abilify since 2010. States depression 4/5 states thought of self harm without plan. States anxiety is high. States a lot of stress. States 4-8 hours of sleep a night with trouble staying asleep.

## 2021-06-12 NOTE — PROGRESS NOTES
Weekly Progress Note  SimonaJaylin omalley  1970  [id number]      D) Patient attended 1 group this week with excused and unexcused absences, also met individually with counselor.   A) Staff facilitated groups and reviewed treatment progress. Assessed for [facility].   R) No VAP needed at this time. Pt working on the following dimensions:  Dimension #1 - Withdrawal Potential - Risk 1, mild concerns. Patient reported at intake that her most recent pattern of meth use had been 3x per week and that her last use was on 7/21/17, but then on 7/25/17, she reported using the night before. Patient also reported individually to counselor using on 8/5/17. Patient did not display any significant symptoms of withdrawal or intoxication, and did not indicate use later in the week. Staff to continue to monitor.  Dimension #2 - Biomedical - Risk 1, mild concerns. Patient reports health concerns of eczema, COPD, and emphysema, has a PCP whom she sees for her medical concerns, but is not currently taking medications due to out of pocked costs. Patient reported that she would meeting with someone on 8/3/17 to apply for a supplement, and also wanted to re-establish with a PCP (Method 3).   Dimension #3 - Emotional/Behavioral/Cognitive - Risk 2, moderate concerns. Patient reports history of mental health diagnoses of bipolar, anixeyt and PTSD, with no current mental health services. Patient is scheduled for a DA with LINDA Desai on 8/10/17 (Method 1).   Dimension #4 - Treatment Acceptance/Resistance - Risk 1, mild concerns. Patient reports external motivation from probation, but also reports a desire to work towards recovery. Patient contacted staff regarding absences on 7/31 and 8/1, but not on 8/4 (Method 1).   Dimension #5 - Relapse Potential - Risk 3, serious concerns. Patient reports date of last use day prior to starting use, and intake indicates patient has attempted to stop on her own multiple times without success. Patient has a  two use since beginning outpatient treatment, staff to continue to monitor, patient may need a higher level of care.    Dimension #6 - Recovery Environment - Risk 3, serious concerns. Patient reports that she is retired, and will be moving in with her mother. Patient reports that she is in the middle of ending a relationship where her significant other uses.Patient reports probation in 2 counties.  T) Patient educated on Strengths. Patient has completed 9 of 90 hours of program at this time. Projected discharge date is 10/30/17. Current discharge plan is TBD.     JUSTUS Mcintosh, [facility]  8/6/2017, 4:03 PM       Weekly Educational Topics Date Education   1. Understanding Dual Diagnoses, week 1         Understanding Dual Diagnoses, week 2     2. Coping with Stress     3. Managing Difficult Feelings     4. Building Recovery Support     5. Managing Thinking Problems     6. Developing Assertive Communication Skills     7. The Art of Change     8. Relapse Prevention     9. Relationships/Boundaries     10. Grief and Loss 7/25, 7/28    11. Strengths 8/2    12. Dual Recovery Planning     Seeking Safety Unit every Tuesday     Mindfulness every Friday 7/28

## 2021-06-12 NOTE — PROGRESS NOTES
Telemedicine Visit: The patient's condition can be safely assessed and treated via synchronous audio and visual telemedicine encounter.      Reason for Telemedicine Visit: Services only offered telehealth    Originating Site (Patient Location): Patient's home    Distant Site (Provider Location): Provider Remote Setting- Home Office    Consent:  The patient/guardian has verbally consented to: the potential risks and benefits of telemedicine (video visit) versus in person care; bill my insurance or make self-payment for services provided; and responsibility for payment of non-covered services.     Mode of Communication:  Video Conference via Zoom    Call Started at: 10:36 AM  Call Ended at: 11:30 AM    As the provider I attest to compliance with applicable laws and regulations related to telemedicine.
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
## 2021-06-12 NOTE — PROGRESS NOTES
"Mental Health tele Visit Note    Patient: Jaylin Easley    : 1970 MRN: [id number]    Date: 2020 start time: 2:31 PM   stop Time: 3:22 PM   session #1    The patient has been notified of the following:   \"We have found that certain health care needs can be provided without the need for a face to face visit.  This service lets us provide the care you need with a phone conversation.  I will have full access to your [facility] medical record during this entire phone call.   I will be taking notes for your medical record. Since this is like an office visit, we will bill your insurance company for this service.  There are potential benefits and risks of telephone visits (e.g. limits to patient confidentiality) that differ from in-person visits.?  Confidentiality still applies for telephone services, and nobody will record the visit.  It is important to be in a quiet, private space that is free of distractions (including cell phone or other devices) during the visit.?? If during the course of the call I believe a telephone visit is not appropriate, you will not be charged for this service\"  Consent has been obtained for this service by care team member: Yes, per verbal agreement   Session Type: Patient is participating in a telemedicine phone visit through [employer]    Chief Complaint   Patient presents with     Manic Behavior     Gaining background history as well as symptomology       New symptoms or complaints: None reported, this is patient's initial session, focused on gaining history and symptomology    Functional Impairment:   Personal: 3  Family: 2  Work: 2  Social:2          ASSESSMENT: Current Emotional / Mental Status (status of significant symptoms):              Risk status (Self / Other harm or suicidal ideation)              Patient denies being a personal safety risk              Patient reports the following current or recent suicidal ideation or behaviors: Patient reports in the past " couple of weeks she has had thoughts and reports that due to being on probation she would not follow through on these thoughts.              Patient denies current or recent homicidal ideation or behaviors.              Patient denies current or recent self injurious behavior or ideation.              Patient denies other safety concerns.              Patient reports a risk factor for her is when she sees extremes within her bipolar diagnoses, either manic or severely depressed              Patient reports that she has 1 close friends and her youngest son as her support systems              Recommended that patient call 911 or go to the local ED should there be a change in any of these risk factors.                Attitude:                                   Cooperative               Orientation:                             To person, place, and time              Speech                          Rate / Production:       Normal                           Volume:                       Normal               Mood:                                      Normal Apathetic              Thought Content:                    Clear               Thought Form:                        Coherent               Insight:                                     Fair       Patient's impression of their current status: Patient shared regarding her personal was chemical use, probation, which included looking at her family of origin, focusing on relationships, exploring her chemical use history, probation and upcoming court, and starting to look at symptomology she experiences.  Patient identified that she was in the hospital in July 2020 due to a manic episode and she also received several commitments with that hospitalization.  She reports that completing this assessment as part of the requirements for her probation.    Therapist impression of patients current state: Patient presented as actively engaged throughout session.  All she said that she  needed to complete a diagnostic assessment for her probation she was open and shared questions asked by this writer throughout session.  Patient began to share about her childhood, her family, and the struggles that she has experienced throughout her life.  Patient also completed the NURIS-7 and the PHQ-9 during session.    Type of psychotherapeutic technique provided: Client centered    Progress toward short term goals: Initial session, treatment plan not yet established    Progress as expected as patient was open and engaged throughout the initial session, Pt discussing concerns and coping strategies during tele-sessions. Pt working on homework assignments and skills learned in therapy between sessions    Review of long term goals:   Not done at today's visit  Treatment plan to be completed at a future session       Diagnosis:  1. Bipolar affective disorder in remission (H)    No change      Plan and Follow up: Patient will focus on stability during the next week by getting regular sleep, focusing on eating as healthy as she can, and engaging in movement/activity on a daily basis.  Patient will continue to take all medications as prescribed and will consult her doctor with any questions or concerns that she may have.  Patient will refrain from chemical use beyond medications that she has prescribed.  Patient and therapist will meet again in 1 week.      Discharge Criteria/Planning: Patient will continue with follow-up until therapy can be discontinued without return of signs and symptoms.              I have reviewed the note as documented above.  This accurately captures the substance of my conversation with the patient.  As the provider I attest to compliance with applicable laws and regulations related to telemedicine.  Merly Yates, LICSW

## 2021-06-12 NOTE — PROGRESS NOTES
"Individual Phone Session    Jaylin Easley is a 50 y.o. female who is being evaluated via telephone visit.      The patient has been notified of the following:      \"We have found that certain health care needs can be provided without the need for a face to face visit.  This service lets us provide the care you need with a phone conversation. I will have full access to your [facility] medical record during this entire phone call. I will be taking notes for your medical record. Since this is like an office visit, we will bill your insurance company for this service. There are potential benefits and risks of telephone visits (e.g. limits to patient confidentiality) that differ from in-person visits.?  Confidentiality still applies for telephone services, and nobody will record the visit.  It is important to be in a quiet, private space that is free of distractions (including cell phone or other devices) during the visit.?If during the course of the call I believe a telephone visit is not appropriate, you will not be charged for this service\"    Counselor and patient spoke via phone for 47 minutes for chemical health evaluation.    Call Notes: Counselor contacted patient for chemical health evaluation during suspension of in person services.     Provider location: [facility]   Patient location: Home  Mode of Transmission: Telephone    Call Started at: 1:30 PM  Call Ended at: 2:17 PM    Patient's engagement in the telephone visit: high     Supervising MD: Dr. Vanessa Johnson, [facility]  10/15/2020, 1:33 PM      "

## 2021-06-12 NOTE — PROGRESS NOTES
Patient completed Diagnostic Assessment for Mental Health on 10/26/20 with  LINDA Kelly. Diagnoses at that time were: Bipolar Disorder I, most recent episode manic, recurrent;      Assessment was reviewed by Co-occurring [facility] staff.     Angelique Garcia, [facility], [facility]  11/5/2020, 1:46 PM

## 2021-06-12 NOTE — PROGRESS NOTES
[facility] CHEMICAL DEPENDENCY   DISCHARGE SUMMARY       NAME:  Jaylin Gambinodeborahshahram   Physician:  Dr. Mendez      MRN:  [id number] :  JUSTUS Mcintosh,  [facility]   SS#:  [id number] Funding Source:  Medicare A & B   Admit Date: 17 Discharge Date: 2017     :  1970 Hours Completed: 22   Initial Diagnosis: Methamphetamine Use Disorder, Severe (F14.20)   Final Diagnosis: Methamphetamine Use Disorder, Severe (F14.20)   Discharge Address:    [address] [place] [id number]        Discharge Type:  Patient left without staff approval.     Reasons for and circumstances of service termination:   [facility] Patient had been attending inconsistently at [facility], citing absences were due to issues with transportation. Patient contacted primary counselor on 17, indicating that she would like to be tranferred to [facility] program due to proximity. The counselor for [facility] reached to the patient who was to begin treatment at [facility] on 17, but did not attend group on that date or 17, and did not contact staff regarding absences. Per patient's medical record, patient then contacted counselor at [facility] program indicating that she was moving to [place] and would be unable to attend treatment at [facility]. Patient was provided information on how to obtain an updated Rule 25 to begin treatment close to [place]. Due to these circumstances, patient discharged on this date, 2017 .        Dimension/Course of Treatment/Individualized Care:   1. Withdrawal Potential - Unable to assess at discharge due to lack of contact with staff. Last known risk ratings as of 17 as follows: Risk 0, no concerns. Patient reported at intake that her most recent pattern of meth use had been 3x per week and that her last use was on 17, but then on 17, she reported using the night before.  Patient also reported individually to counselor using on 8/5/17. Patient shared in group on 8/23/17 that she had used the night prior. Counselor and patient discussed the potential referral to a higher level of care. Staff to continue to monitor.       2. Biomedical Conditions and Complications - Unable to assess at discharge due to lack of contact with staff. Last known risk ratings as of 8/25/17 as follows: Risk 1, mild concerns. Patient reports health concerns of eczema, COPD, and emphysema, has a PCP whom she sees for her medical concerns, but is not currently taking medications due to out of pocked costs. Patient reported that she would meeting with someone on 8/3/17 to apply for a supplement and reported that the supplement will begin on 09/01/17. Patient has not re-established with a PCP at this time.       3. Emotional/Behavioral/Cognitive Conditions and Complications - Unable to assess at discharge due to lack of contact with staff. Last known risk ratings as of 8/25/17 as follows: Risk 2, moderate concerns. Patient reports history of mental health diagnoses of bipolar, anxiety and PTSD, with no current mental health services. Patient attended appointment for DA on 8/10/17 and met with psychiatrist on 8/23/17, but reports that she will be unable to afford her medication until getting her supplement. Patient was encouraged by staff to look into emergency assistance in her county. Patient presented for group on 8/25/17, but reported to primary counselor that she didn't feel that she could handle being in group, and left shortly after the beginning of group. Primary counselor to follow up with patient.        4. Treatment Acceptance/Resistance - Unable to assess at discharge due to lack of contact with staff. Last known risk ratings as of 8/25/17 as follows: Risk 1, mild concerns. Patient reports external motivation from probation, but also reports a desire to work towards recovery. Patient communicated about  absences this past week, but has attended very few sessions of group so far. Patient indicated this week that she has been having difficulty affording paying for gas and parking. Counselor to follow up with patient about treatment engagement.        5. Relapse/Continued Use/Continued Problem Potential Risk level - Unable to assess at discharge due to lack of contact with staff. Last known risk ratings as of 8/25/17 as follows: Risk 3, serious concerns. Patient reports date of last use day prior to starting use, and intake indicates patient has attempted to stop on her own multiple times without success. Patient has a three known uses since beginning outpatient treatment, with the last reported use on 8/22/17. Should patient have any additional uses, a higher level of care will be recommended.       6. Recovery Environment  - Unable to assess at discharge due to lack of contact with staff. Last known risk ratings as of 8/25/17 as follows: Risk 3, serious concerns. Patient reports that she is retired, and will be moving in with her mother. Patient reported difficulty making changes with the people who are around her. Patient reports probation in 2 [facility].       Services Provided: Intake, assessment, treatment planning, education, group discussion, film, lectures, and recommendation at discharge.     Strengths: honesty, sociable, caring, endearing, hard working, compassionate.  Needs: increase in structure and accountability to maintain abstinence.        Program Involvement: Poor  Attendance: Poor  Ability to relate in group/   Other program activities: Fair  Assignment Completion: Poor  Overall Behavior: Fair  Reported Family/Significant   Other Involvement: Poor    Prognosis: Poor      Recommendations       Patient is recommended to complete an updated Chemical Health Assessment (Rule 25) to determine appropriate level of care.     Mental Health Referral  Individual Therapy and [facility]      Physical Health  Referral:  Personal Physician                Counselor Name and Title:  JUSTUS Mcintosh, [facility]    Date:  9/14/2017  Time: 8:40 AM

## 2021-06-12 NOTE — PROGRESS NOTES
Jaylin Easley attended 1 hours of group on 11/06/2020.     The group topic was Addiction 101, patient was responsive to topic.     Patient's engagement in the group session: medium     Total number of patients present 10.     Counselor(s) present: JESSICA Mcintosh LADC and MELVI Berman    Supervising MD: JESSICA Lindquist LADC and MELVI Berman  11/6/2020, 1:47 PM

## 2021-06-12 NOTE — PROGRESS NOTES
Weekly Progress Note  Jaylin Easley  1970  [id number]      D) Patient attended 2 groups  this week with 1 excused absences.   A) Staff facilitated groups and reviewed treatment progress. Assessed for [facility].   R) No VAP needed at this time. Pt working on the following dimensions:  Dimension #1 - Withdrawal Potential - Risk 1, mild concerns. Patient reported at intake that her most recent pattern of meth use had bee 3x per week and that her last use was on 7/21/17, but then on 7/25/17, she reported using the night before. Patient did not display any significant symptoms of withdrawal or intoxication, and did not indicate use later in the week. Staff to continue to monitor.  Dimension #2 - Biomedical - Risk 1, mild concerns. Patient reports health concerns of eczema, COPD, and emphysema, has a PCP whom she sees for her medical concerns, but is not currently taking medications due to out of pocked costs. Patient reports no changes to her physical health this past week.   Dimension #3 - Emotional/Behavioral/Cognitive - Risk 2, moderate concerns. Patient reports history of mental health diagnoses of bipolar, anixeyt and PTSD, with no current mental health services.  Dimension #4 - Treatment Acceptance/Resistance - Risk 1, mild concerns. Patient reports external motivation from probation, but also reports a desire to work towards recovery. Patient missed 1:1 meeting with counselor due to transportation, but did not communicate with counselor ahead of time, rescheduled for 7/31/17.   Dimension #5 - Relapse Potential - Risk 3, serious concerns. Patient reports date of last use day prior to starting use, and intake indicates patient has attempted to stop on her own multiple times without success.   Dimension #6 - Recovery Environment - Risk 3, serious concerns. Patient reports that she is retired, and stays between her mother and her significant other. Patient reports some support, but also reports many around her are  using, including her significant other. Patient reports probation in 2 counties.  T) Patient educated on Grief and Loss. Patient has completed 5 of 90 hours of program at this time. Projected discharge date is 10/30/17. Current discharge plan is TBD.     JUSTUS Mcintosh, [facility]  7/30/2017, 2:40 PM       Weekly Educational Topics Date Education   1. Understanding Dual Diagnoses, week 1         Understanding Dual Diagnoses, week 2     2. Coping with Stress     3. Managing Difficult Feelings     4. Building Recovery Support     5. Managing Thinking Problems     6. Developing Assertive Communication Skills     7. The Art of Change     8. Relapse Prevention     9. Relationships/Boundaries     10. Grief and Loss 7/25, 7/28    11. Strengths     12. Dual Recovery Planning     Seeking Safety Unit every Tuesday     Mindfulness every Friday 7/28

## 2021-06-12 NOTE — PROGRESS NOTES
Weekly Progress Note  Jaylin Easley  1970  [id number]      D) Patient attended 2 group this week with excused and unexcused absences.  A) Staff facilitated groups and reviewed treatment progress. Assessed for [facility].   R) No VAP needed at this time. Pt working on the following dimensions:  Dimension #1 - Withdrawal Potential - Risk 0, no concerns. Patient reported at intake that her most recent pattern of meth use had been 3x per week and that her last use was on 7/21/17, but then on 7/25/17, she reported using the night before. Patient also reported individually to counselor using on 8/5/17. Patient did not report any use in the past week. Staff to continue to monitor.  Dimension #2 - Biomedical - Risk 1, mild concerns. Patient reports health concerns of eczema, COPD, and emphysema, has a PCP whom she sees for her medical concerns, but is not currently taking medications due to out of pocked costs. Patient reported that she would meeting with someone on 8/3/17 to apply for a supplement, did not report in group if this occurred. Patient also reported wanting to re-establish with a PCP (Method 3).   Dimension #3 - Emotional/Behavioral/Cognitive - Risk 2, moderate concerns. Patient reports history of mental health diagnoses of bipolar, anxiety and PTSD, with no current mental health services. Patient attended appointment for DA on 8/10/17 and it is indicated in patient's chart that she has been referred to psychiatry and psychotherapy (Method 1).   Dimension #4 - Treatment Acceptance/Resistance - Risk 1, mild concerns. Patient reports external motivation from probation, but also reports a desire to work towards recovery. Patient communicated about absences this past week, but has attended very few sessions of group so far (Method 1). Counselor to follow up with patient about treatment engagement.   Dimension #5 - Relapse Potential - Risk 3, serious concerns. Patient reports date of last use day prior to starting  use, and intake indicates patient has attempted to stop on her own multiple times without success. Patient has a two uses since beginning outpatient treatment, staff to continue to monitor, patient may need a higher level of care. Patient reported no use in the past week.   Dimension #6 - Recovery Environment - Risk 3, serious concerns. Patient reports that she is retired, and will be moving in with her mother. Patient reports this week that her significant other has agreed to not use substances. Patient reports probation in 2 counties.  T) Patient educated on Dual Diagnosis I. Patient has completed 21 of 90 hours of program at this time. Projected discharge date is 10/30/17. Current discharge plan is TBD.     JUSTUS Mcintosh, [facility]  8/20/2017, 5:57 PM       Weekly Educational Topics Date Education   1. Understanding Dual Diagnoses, week 1 8/14, 8/18        Understanding Dual Diagnoses, week 2     2. Coping with Stress     3. Managing Difficult Feelings     4. Building Recovery Support     5. Managing Thinking Problems     6. Developing Assertive Communication Skills     7. The Art of Change     8. Relapse Prevention     9. Relationships/Boundaries     10. Grief and Loss 7/25, 7/28    11. Strengths 8/2    12. Wellness 8/7, 8/8    Seeking Safety Unit every Tuesday     Mindfulness every Friday 7/28, 8/18

## 2021-06-12 NOTE — PROGRESS NOTES
Correct pharmacy verified with patient and confirmed in snapshot? [x] yes []no    Medications Phoned  to Pharmacy [] yes [x]no  Name of Pharmacist:  List Medications, including dose, quantity and instructions      Medication Prescriptions given to patient   [] yes  [x] no   List the name of the drug the prescription was written for.       Medications ordered this visit were e-scribed.  Verified by order class [x] yes  [] no  Abilify 5 mg  Wellbutrin 150 mg    Medication changes or discontinuations were communicated to patient's pharmacy: [] yes  [x] no    UA collected [] yes    [x] no    [place] Prescription Monitoring Program Reviewed? [x] yes  [] no    Referrals were made to: none    Future appointment was made: [x] yes  [] no    Dictation completed at time of chart check: [x] yes  [] no    I have checked the documentation for today s encounters and the above information has been reviewed and completed.

## 2021-06-12 NOTE — PROGRESS NOTES
Mental Health Visit Note    8/14/2017    Start time: 1300    Stop Time: 1330   Session # 2    Jaylin Easley is a 47 y.o. female is being seen today for    Chief Complaint   Patient presents with     MH Follow Up     Anxiety     Depression     New symptoms or complaints: paranoid, recent use    Functional Impairment:   Personal: 3  Family: 3  Work: 3  Social:4    Clinical assessment of mental status: The patient was on time for their scheduled session.  They were appropriately dressed with good grooming and hygiene.  The patient was oriented X3.  Patient was pleasant and cooperative.  Mood and affect were anxious and congruent with the content of her speech.   The patient made appropriate eye contact.  Recent and remote memories were intact.  Thought process was paranoid.  Speech/language (tone and rate) were normal.  Insight and judgment were adequate.    Suicidal/Homicidal Ideation present: None Reported This Session    Patient's impression of their current status:  Patient comes to this session requesting for it to be shortened because she has another appointment she needs to attend to get her insurance information in place.  She expresses anxiety and paranoia.  She does report meth use recently and is wondering if that is to blame.  She is considering going to the ER.      Therapist impression of patients current state:  Patient's thoughts/feelings were explored and validated.  We did explore safety, patient denies homicidal/suicidal ideations.  While patient's paranoia does appear to be elevated, she doesn't appear to be impaired in judgement around safety.  Her substance use continues to be her greatest barrier to therapeutic progress.  Patient may be more appropriate for inpatient treatment, which will be explored more with her in future sessions.  She was encouraged to keep her psychiatric appointment as she is also not on any medications at this point.  We were unable to get to treatment planning  today given the shortened session and the patient's paranoia which was explored and addressed during this session.      Type of psychotherapeutic technique provided: Client centered and MI    Progress toward short term goals:Poor progress, while patient did show for her follow up session and was engaged  and apparently authentic, her substance use will interfere with progress.     Review of long term goals: Not done at today's visit    Diagnosis:   1. Bipolar disorder, unspecified    2. Methamphetamine use disorder, severe        Plan and Follow up: 1.  Patient to schedule for continued follow up psychotherapy appointments.    2.  Patient will use their crisis plan and/or access crisis services should symptoms       become worse.      3.  Patient to remain medication compliant.  4.  Patient to abstain from drugs and alcohol.  5.  Keep psychiatry appointment.   6.  Should patient feel unsafe she was advised to present to the ER.     Discharge Criteria/Planning: Patient will continue with follow-up until therapy can be discontinued without return of signs and symptoms.    Suman Jenkins 8/14/2017      This note was created with help of Dragon dictation software. Grammatical / typing errors are not intentional and inherent to the software.

## 2021-06-12 NOTE — PROGRESS NOTES
[facility]   Mental Health and Addiction Care   [facility], [facility], and [facility]    [phone] or [phone]   Treatment Plan    Met for 1:1 on 8/2/17    Patient  Name: Jaylin Easley  MRN: [id number]   Counselor: JUSTUS Mcintosh, MELVI    Title: Dimension 1 Withdrawal Potential, Risk level 0, no concerns  Plan Date: 8/2/2017  Problem: Patient reports date of last use of meth on 7/29/17, reports most recent pattern of use has been 3x per week. Patient does not display any withdrawal concerns at this time.     Goal: Begin Date: 8/2/2017    Target Date: 11/02/17  Maintain abstinence throughout treatment in order to avoid experiencing withdrawal symptoms and further your recovery    Method 1: Begin Date:8/2/2017   Target Date: 11/02/17   Date Completed:   Patient to refrain from any illicit substance or alcohol use, and report any substance/alcohol use to primary counselor to determine if any changes need to be made to the treatment plan to address withdrawal.       Title: Dimension 2 Biomedical condition, Risk level 1, mild concerns  Plan Date: 8/2/2017  Problem: Patient reports multiple health concerns, and that she needs to re-establishing with a primary care physician.     Goal: Begin Date: 8/2/2017    Target Date: 11/02/17  To maintain stable physical health to participate in treatment services, while also making healthy changes to support ongoing recovery.     Method 1: Begin Date: 8/2/2017   Target Date: 11/02/17   Date Completed:   Patient to report any changes in physical health that would impact treatment participation.     Is Nicotine use indicated on the assessment?yes  Method 2: Begin Date: 8/2/2017  Target Date: 11/02/17  Date Completed:   Staff to provide patient with nicotine cessation information and help on how to quit use. Patient to report progress on stopping nicotine use to staff.     Method 3: Begin Date:8/2/2017   Target Date: 09/02/17   Date Completed:  Patient to  establish care with a primary care physician, report back to primary counselor if experiencing any difficulties.         Title: Dimension 3, Emotional, behavioral, cognitive condition, Risk level 2, moderate concerns  Plan Date: 8/2/2017  Problem: Patient reports history of mental health diagnoses of bipolar, anxiety and PTSD, with no current mental health services.     Goal: Begin Date: 8/2/2017    Target Date: 11/02/17   Patient to report better understanding of the relationship between her mental health and substance use and be better able to cope with mental health symptoms.     Method 1: Begin Date:8/2/2017   Target Date: 09/02/17  Date Completed:   Patient to meet with primary counselor or other  staff for the purpose of completed a diagnostic assessment.    Method 2: Begin Date: 8/2/2017   Target Date: 09/02/17  Date Completed:   Patient to complete Healthy Relationships workbook.         Title: Dimension 4, Treatment Acceptance/Resistance, Risk level 1, mild concerns  Plan Date: 8/2/2017  Problem: Patient reports external motivation from probation, but also reports a desire to work towards recovery.     Goal: Begin Date: 8/2/2017    Target Date: 11/02/17  Patient to participate in MICD outpatient has a way to increase her motivation towards recovery    Method 1: Begin Date: 8/2/2017   Target Date: 11/02/17   Date Completed:   Patient to attend all schedule MICD groups or 1:1s, and contact staff if unable to attend. (Angelique: [phone])    Method 2: Begin Date: 8/2/2017   Target Date: 09/02/17    Date Completed:   Patient to identify 5 goals that you would like to complete in the next 6 months, and how ongoing recovery will help you to achieve these things. Share with counselor.       Title: Dimension 5, Relapse potential, Risk level 3, serious concerns  Plan Date: 8/2/2017    Problem: Patient reports date of last use over the past weekend, and has had multiple attempts to discontinue on her own. Patient  "reports history of treatment in the past. Patient reports feeling \"lost\" due to her substance use.     Goal: Begin Date: 8/2/2017    Target Date: 11/02/17  Patient to gain understanding of relapse prevention skills and use these tools to maintain abstinence throughout outpatient treatment.     Method 1: Begin Date: 8/2/2017   Target Date: 11/02/17   Date Completed:   Patient to share in daily check-in any urges and addictive thinking to better understand her pattern of use and to prevent relapse in the future.     Method 2: Begin Date: 8/2/2017   Target Date: 09/02/17   Date Completed:   Patient to identify 5 ways that substance use has affected the way that you feel about yourself, and ways to challenge these thoughts. Share with counselor.          Title: Dimension 6, Recovery Environment, Risk level 3, serious concerns  Plan Date:  8/2/2017  Problem: Patient reports that she is staying with her mother and getting out of relationship with someone who is using. Patient reports her main supports are her mother and her sons. Patient reports her spirituality is source of support. Patient is currently on probation in [facility].     Goal: Begin Date: 8/2/2017    Target Date: 11/02/17  Patient to make changes in her environment to support ongoing recovery.     Method 1: Begin Date: 8/2/2017   Target Date: 09/02/17   Date Completed:   Patient to complete Spirituality Inventory, share any insights with counselor.     Method 2: Begin Date: 8/2/2017   Target Date: 09/15/17   Date Completed:   Patient to try out at least 3 recovery meetings, share with counselor/group any benefits or challenges.         By signing this document, I am acknowledging that I was actively and directly involved in the development of my treatment plan.             Patient  Signature_________________________________________         Date__________________        Staff Signature  JUSTUS Mcintosh,  [facility]     Date: 8/6/2017., 4:12 PM             "
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
## 2021-06-12 NOTE — PROGRESS NOTES
Patient arrived for group, but reported to primary counselor that she didn't feel that she could stay and felt too uncomfortable. Counselor requested patient attempt to stay in group, but patient left within 15 minutes of group starting. Counselor to follow up with patient.     JUSTUS Mcintosh, [facility]  8/25/2017, 2:23 PM

## 2021-06-12 NOTE — PROGRESS NOTES
Jaylin Easley attended 3 hours of group therapy today. Patient received treatment plan on this date, and signed with no changes at this time.     8/7/2017 1:59 PM Angelique Garcia

## 2021-06-12 NOTE — PROGRESS NOTES
Weekly Progress Note/Transfer Note   KaushalJaylin  1970  [id number]      D) Pt attended ZERO groups  this week with excused absences. Patient contacted staff about transferring to [facility], see documentation A) Staff facilitated groups and reviewed tx progress. Assessed for [facility]. R) Unable to assess along six dimensions or for [facility] due to lack of attendance.   T) Patient has completed 22 hours of [facility] treatment at [facility], [facility], counselor to contact patient about start date.      Angelique Garcia, [facility], [facility]  9/1/2017, 4:30 PM           Weekly Educational Topics Date Education   1. Understanding Dual Diagnoses, week 1 8/14, 8/18        Understanding Dual Diagnoses, week 2 8/23    2. Coping with Stress     3. Managing Difficult Feelings     4. Building Recovery Support     5. Managing Thinking Problems     6. Developing Assertive Communication Skills     7. The Art of Change     8. Relapse Prevention     9. Relationships/Boundaries     10. Grief and Loss 7/25, 7/28    11. Strengths 8/2    12. Wellness 8/7, 8/8    Seeking Safety Unit every Tuesday     Mindfulness every Friday 7/28, 8/18

## 2021-06-12 NOTE — PROGRESS NOTES
Weekly Progress Note  Jaylin Easley  1970  [id number]      D) Patient attended 1 group this week with excused and unexcused absences.  A) Staff facilitated groups and reviewed treatment progress. Assessed for [facility].   R) No VAP needed at this time. Pt working on the following dimensions:  Dimension #1 - Withdrawal Potential - Risk 0, no concerns. Patient reported at intake that her most recent pattern of meth use had been 3x per week and that her last use was on 7/21/17, but then on 7/25/17, she reported using the night before. Patient also reported individually to counselor using on 8/5/17. Patient shared in group on 8/23/17 that she had used the night prior. Counselor and patient discussed the potential referral to a higher level of care. Staff to continue to monitor.  Dimension #2 - Biomedical - Risk 1, mild concerns. Patient reports health concerns of eczema, COPD, and emphysema, has a PCP whom she sees for her medical concerns, but is not currently taking medications due to out of pocked costs. Patient reported that she would meeting with someone on 8/3/17 to apply for a supplement and reported that the supplement will begin on 09/01/17. Patient has not re-established with a PCP at this time (Method 3).   Dimension #3 - Emotional/Behavioral/Cognitive - Risk 2, moderate concerns. Patient reports history of mental health diagnoses of bipolar, anxiety and PTSD, with no current mental health services. Patient attended appointment for DA on 8/10/17 and met with psychiatrist on 8/23/17, but reports that she will be unable to afford her medication until getting her supplement (Method 1). Patient was encouraged by staff to look into emergency assistance in her county. Patient presented for group on 8/25/17, but reported to primary counselor that she didn't feel that she could handle being in group, and left shortly after the beginning of group. Primary counselor to follow up with patient.   Dimension #4 -  Treatment Acceptance/Resistance - Risk 1, mild concerns. Patient reports external motivation from probation, but also reports a desire to work towards recovery. Patient communicated about absences this past week, but has attended very few sessions of group so far (Method 1). Patient indicated this week that she has been having difficulty affording paying for gas and parking. Counselor to follow up with patient about treatment engagement.   Dimension #5 - Relapse Potential - Risk 3, serious concerns. Patient reports date of last use day prior to starting use, and intake indicates patient has attempted to stop on her own multiple times without success. Patient has a three known uses since beginning outpatient treatment, with the last reported use on 8/22/17. Should patient have any additional uses, a higher level of care will be recommend.   Dimension #6 - Recovery Environment - Risk 3, serious concerns. Patient reports that she is retired, and will be moving in with her mother. Patient reported difficulty making changes with the people who are around her. Patient reports probation in [place] counties.  T) Patient educated on Dual Diagnosis II. Patient has completed 22 of 90 hours of program at this time. Projected discharge date is 10/30/17. Current discharge plan is TBD.     JUSTUS Mcintosh, [facility]  8/25/2017, 4:05 PM       Weekly Educational Topics Date Education   1. Understanding Dual Diagnoses, week 1 8/14, 8/18        Understanding Dual Diagnoses, week 2 8/23    2. Coping with Stress     3. Managing Difficult Feelings     4. Building Recovery Support     5. Managing Thinking Problems     6. Developing Assertive Communication Skills     7. The Art of Change     8. Relapse Prevention     9. Relationships/Boundaries     10. Grief and Loss 7/25, 7/28    11. Strengths 8/2    12. Wellness 8/7, 8/8    Seeking Safety Unit every Tuesday     Mindfulness every Friday 7/28, 8/18
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
## 2021-06-12 NOTE — PROGRESS NOTES
Telemedicine Visit: The patient's condition can be safely assessed and treated via synchronous audio and visual telemedicine encounter.      Reason for Telemedicine Visit: Services only offered telehealth    Originating Site (Patient Location): Patient's home    Distant Site (Provider Location): [facility]: [place]    Consent:  The patient/guardian has verbally consented to: the potential risks and benefits of telemedicine (video visit) versus in person care; bill my insurance or make self-payment for services provided; and responsibility for payment of non-covered services.     Mode of Communication:  Video Conference via Zoom    Call Started at: 09:00 AM  Call Ended at: 12:00 PM    As the provider I attest to compliance with applicable laws and regulations related to telemedicine.

## 2021-06-12 NOTE — PROGRESS NOTES
"Outpatient Substance Use Disorder Treatment - Initial Services Plan     Patient  Name: Jaylin Easley  MRN: [id number]   : 1970  Admit Date: 2020        Patient describes their immediate need: To learn recovery skills to prevent relapse.     Are there any immediate Safety Needs such as (physical, stability, mobility):  Pt is able to get medical care as needed. Pt denies immediate concerns.     Immediate Health Needs and Plan:   None    Vulnerable Adult: No     Issues to be addressed in the first sessions:   Orientation to program.  Introduction to peers.    Patient strengths and needs:   Strengths identified as \" I'm a good friend and I can be honest.\"  Needs identified as \"Probably saying no and communication.\"    Plan for patient for time between intake and completion of the treatment plan:   Attend all group therapy sessions as directed, complete all written and oral assignments as directed, and remain clean and sober. A relapse, if any, must be reported to staff immediately in order to ensure you are receiving the proper level of care. If you cannot attend a group therapy session you must call contact information provided in intake folder and leave a message before or during group hours.       Vulnerable Adult Review   [X] Review of the Facility Abuse Prevention plan was reviewed with the patient   [X] No Individual Abuse Plan is necessary   [ ] In addition to the Facility Abuse Prevention plan, an Individual Abuse Plan will be put in place       Staff Name/Title: MELVI Scott  Date: 2020  Time: 11:01 AM          "

## 2021-06-12 NOTE — PROGRESS NOTES
PATTI. Counselor and patient met 1:1 to discuss treatment planning. A. Counselor inquired about patient's history, particularly related to patient's mental health and substance use. Counselor inquired as to patient's goals for treatment. Counselor discussed referral to [facility] clinic, as well as establishing care with a primary care physician, and building additional support in the community. Counselor also informed patient that her [facility]  had requested a treatment verification. Counselor also encouraged patient to be honest about her use, as it enables staff to help her more. R. Patient discussed that she worked as a  and that she had to resign around 10 years ago. Patient discussed that she began experimenting with substances around age 12, and primarily has used stimulants throughout her life. Patient reported having depression during adolescents, but started to experience manic episodes in her 20s. Patient reported having a cycle of 4 months of amina, 2 months stable mental health, 4 months of depression and then another 2 months stable mental health for several years, and that they did not appear to follow the seasons. Patient reported it had been a few years since she had any manic episodes. Patient reported that she has been off of medication for 6 weeks and would like to establish care at [facility]. Patient reported that she feels that she has lost herself in addiction, and reported relationships and money have bee significant triggers. Patient also reported her main supports are her adult sons and her mother, and is in the process of getting out of relationship with someone who is using. Patient reported using meth over the past weekend, which made her realize that she needs to get out of her environment. JASMYNE. Counselor to follow up with clinic regarding referral, and present treatment plan to patient.     JUSTUS Mcintosh, [facility]  8/2/2017, 3:56 PM

## 2021-06-12 NOTE — PROGRESS NOTES
Pt absent from first scheduled MICD IOP group at this location. No VM or other contact w counselor.

## 2021-06-13 NOTE — PROGRESS NOTES
Telemedicine Visit: The patient's condition can be safely assessed and treated via synchronous audio and visual telemedicine encounter.      Reason for Telemedicine Visit: Services only offered telehealth    Originating Site (Patient Location): Patient's home    Distant Site (Provider Location): [facility]: [place]'s    Consent:  The patient/guardian has verbally consented to: the potential risks and benefits of telemedicine (video visit) versus in person care; bill my insurance or make self-payment for services provided; and responsibility for payment of non-covered services.     Mode of Communication:  Video Conference via Zoom    Call Started at: 09:30 AM  Call Ended at: 12:05 PM    As the provider I attest to compliance with applicable laws and regulations related to telemedicine.

## 2021-06-13 NOTE — PROGRESS NOTES
Jaylin Easley attended 1 hours of group today.     The group topic was Relapse, patient was responsive to topic.     Patient's engagement in the group session: medium     Total group size: 6      JESSICA Mcintosh, MINNA and MELVI Berman  11/11/2020, 12:31 PM

## 2021-06-13 NOTE — PROGRESS NOTES
Outpatient Mental Health Psychotherapy Treatment Plan    Name:  Jaylin Easley  :  1970  MRN:  [id number]    Treatment Plan:  Initial Treatment Plan  Date Treatment Plan Initiated: 2020  Treatment Plan: Updated Treatment Plan R  Intake/initial treatment plan date:   Benefit and risks and alternatives have been discussed: Yes    Plan: Bipolar affective disorder  ? Other:   Goal: Patient will find a balance between manage and depressive symptoms   Strategies:  Take medication as prescribed on a daily basis     Increase involvement in meaningful activities     Develop assertive communication skills and strategies      Engage in daily self-care      Improve relationships with family , specifically her oldest son     In the winter months be more mindful of depressive symptomology while on the spring summer months be more mindful of manic      symptomology          ?    Degree to which this is a problem (1-4): 3  Degree to which goal is met (1-4) initial treatment plan, new goal  Date of Review: 90 days      Functional Impairment: 1=Not at all/Rarely  2=Some days  3=Most Days  4=Every Day Personal: 2  Family: 2  Social: 2  Work: 2    Diagnosis: Bipolar affective disorder    Strengths: Patient is focused on managing the symptoms of her diagnosis, complying with probation expectations, and living a chemically free life  Limitations: Patient is limited in her support systems  Cultural Considerations:  female  Family involvement Yes/No, If no reason at this time patient has chose to do individual sessions    Anticipated intensity of services:  Every 2 weeks    Estimated duration of treatment: 90 days  Necessity for frequency: This frequency is needed to establish therapeutic goals and for continuity of care in order to monitor progress.   Necessity for treatment: To address cognitive, behavioral, and/or emotional barriers in order to work toward goals and to improve quality of life.   Is this  treatment appropriate with minimal intrusion/restrictions:     Persons responsible for this plan:  ? [x] Patient ? [x] Provider ? [] Other: __________________      Provider: Merly Yates  Date:  11/19/2020

## 2021-06-13 NOTE — PROGRESS NOTES
Telemedicine Visit: The patient's condition can be safely assessed and treated via synchronous audio and visual telemedicine encounter.      Reason for Telemedicine Visit: Services only offered telehealth    Originating Site (Patient Location): Patient's home    Distant Site (Provider Location): [facility]: [place]'s    Consent:  The patient/guardian has verbally consented to: the potential risks and benefits of telemedicine (video visit) versus in person care; bill my insurance or make self-payment for services provided; and responsibility for payment of non-covered services.     Mode of Communication:  Video Conference via Zoom    Call Started at: 09:00 AM  Call Ended at: 11:00 AM    As the provider I attest to compliance with applicable laws and regulations related to telemedicine.

## 2021-06-13 NOTE — PROGRESS NOTES
Jaylin Easley attended 2 hours of group today.     The group topic was Relationships/Boundaries, patient was responsive to topic.     Patient's engagement in the group session: medium     Total group size: 4      LINDA Cervantes, [facility]  12/4/2020, 11:54 AM

## 2021-06-13 NOTE — TELEPHONE ENCOUNTER
Staff faxed patient's treatment plan and progress notes to [facility], per their request.                      MELVI Byrne

## 2021-06-13 NOTE — PROGRESS NOTES
Telemedicine Visit: The patient's condition can be safely assessed and treated via synchronous audio and visual telemedicine encounter.      Reason for Telemedicine Visit: Services only offered telehealth    Originating Site (Patient Location): Patient's home    Distant Site (Provider Location): [facility]: [facility]    Consent:  The patient/guardian has verbally consented to: the potential risks and benefits of telemedicine (video visit) versus in person care; bill my insurance or make self-payment for services provided; and responsibility for payment of non-covered services.     Mode of Communication:  Video Conference via Zoom    Call Started at: 09:30 AM  Call Ended at: 12:06 PM    As the provider I attest to compliance with applicable laws and regulations related to telemedicine.

## 2021-06-13 NOTE — PROGRESS NOTES
Jaylin Easley attended 3 hours of group on 11/13/2020.     The group topic was Relapse, patient was responsive to topic.     Patient's engagement in the group session: medium     Total number of patients present 8.     Counselor(s) present: JESSICA Mcintosh, MELVI and MELVI Berman    Supervising MD: JESSICA Saucedo, MELVI and MELVI Berman  11/13/2020, 12:55 PM

## 2021-06-13 NOTE — PROGRESS NOTES
Telemedicine Visit: The patient's condition can be safely assessed and treated via synchronous audio and visual telemedicine encounter.      Reason for Telemedicine Visit: Services only offered telehealth    Originating Site (Patient Location): Patient's home    Distant Site (Provider Location): [facility]: [place]    Consent:  The patient/guardian has verbally consented to: the potential risks and benefits of telemedicine (video visit) versus in person care; bill my insurance or make self-payment for services provided; and responsibility for payment of non-covered services.     Mode of Communication:  Video Conference via Zoom    Call Started at: 09:00 AM  Call Ended at: 12:00 PM    As the provider I attest to compliance with applicable laws and regulations related to telemedicine.
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
## 2021-06-13 NOTE — PROGRESS NOTES
Telemedicine Visit: The patient's condition can be safely assessed and treated via synchronous audio and visual telemedicine encounter.      Reason for Telemedicine Visit: Services only offered telehealth    Originating Site (Patient Location): Patient's home    Distant Site (Provider Location): [facility]: [place]    Consent:  The patient/guardian has verbally consented to: the potential risks and benefits of telemedicine (video visit) versus in person care; bill my insurance or make self-payment for services provided; and responsibility for payment of non-covered services.     Mode of Communication:  Video Conference via Zoom    Call Started at: 09:00 AM  Call Ended at: 12:00 PM    As the provider I attest to compliance with applicable laws and regulations related to telemedicine.

## 2021-06-13 NOTE — PROGRESS NOTES
Jaylin Easley attended 3 hours of group on 11/23/2020.     The group topic was Acceptance, patient was responsive to topic.     Patient's engagement in the group session: medium     Total number of patients present 6.     Counselor(s) present: MELVI Green and MELVI Berman    Supervising MD: MELVI Boland and MELVI Berman  11/23/2020, 12:27 PM

## 2021-06-13 NOTE — PROGRESS NOTES
Telemedicine Visit: The patient's condition can be safely assessed and treated via synchronous audio and visual telemedicine encounter.      Reason for Telemedicine Visit: Services only offered telehealth    Originating Site (Patient Location): Patient's home    Distant Site (Provider Location): Provider Remote Setting- Home Office    Consent:  The patient/guardian has verbally consented to: the potential risks and benefits of telemedicine (video visit) versus in person care; bill my insurance or make self-payment for services provided; and responsibility for payment of non-covered services.     Mode of Communication:  Video Conference via Zoom    Call Started at: 9:00 AM  Call Ended at: 10:00 AM    As the provider I attest to compliance with applicable laws and regulations related to telemedicine.

## 2021-06-13 NOTE — PROGRESS NOTES
Telemedicine Visit: The patient's condition can be safely assessed and treated via synchronous audio and visual telemedicine encounter.      Reason for Telemedicine Visit: Services only offered telehealth    Originating Site (Patient Location): Patient's home    Distant Site (Provider Location): [facility]: [place]    Consent:  The patient/guardian has verbally consented to: the potential risks and benefits of telemedicine (video visit) versus in person care; bill my insurance or make self-payment for services provided; and responsibility for payment of non-covered services.     Mode of Communication:  Video Conference via Zoom    Call Started at: 09:30 AM  Call Ended at: 12:10 PM    As the provider I attest to compliance with applicable laws and regulations related to telemedicine.

## 2021-06-13 NOTE — PROGRESS NOTES
Telemedicine Visit: The patient's condition can be safely assessed and treated via synchronous audio and visual telemedicine encounter.      Reason for Telemedicine Visit: Services only offered telehealth    Originating Site (Patient Location): Patient's home    Distant Site (Provider Location): [facility]: [place]    Consent:  The patient/guardian has verbally consented to: the potential risks and benefits of telemedicine (video visit) versus in person care; bill my insurance or make self-payment for services provided; and responsibility for payment of non-covered services.     Mode of Communication:  Video Conference via Zoom    Call Started at: 09:00 AM  Call Ended at: 12:00 PM    As the provider I attest to compliance with applicable laws and regulations related to telemedicine.

## 2021-06-13 NOTE — PROGRESS NOTES
Addiction Outpatient Weekly Clinical Staffing      Name: Jaylin Easley  MRN: [id number]    Diagnosis: Stimulant Use Disorder, moderate, amphetamine type substance (F15.20)    Admit Date: 11/2/2020    Program:[facility]-occurring [facility]   Date: 12/17/2020        Present:  MELVI Berman, Deb Gagnon, [facility], Jignesh Clark, [facility], [facility] [place] [place], Aldo Bell, [facility] [place] [place], Daria Molina[facility] [place], Bill Welander, [facility] [place], Lyric España, [facility] [place]    Dimension One:  0   Dimension Two:  0   Dimension Three:  2     Dimension Four:  2    Dimension Five: 3   Dimension Six:  3     Specific Treatment Plan Methods and Goals:  Patient to maintain abstinence, maintain healthy physical and mental health, attend follow-up appointments with Dr. Etsevez due to Medicare      Specific Identified Strengths and/or Barriers:  Patient reports desire to complete treatment, be drugs free, and live a happier life. Patient reports legal involvement with [facility]      Developed Plan:  Improive attendance and participation in recovery activities, keep up-to-date with your mental health resources. Increase health coping skills.    Date: 12/17/2020 Time: 11:32 AM    Staff Signature: MELVI Berman

## 2021-06-13 NOTE — PROGRESS NOTES
"Mental Health Psychotherapy Telephone Note    Patient: Jaylin Easley    : 1970 MRN: [id number]    Date: 2020 start time: 3:30 PM   stop Time: 4:15 PM   session #4    The patient has been notified of the following:   \"We have found that certain health care needs can be provided without the need for a face to face visit.  This service lets us provide the care you need with a phone conversation.  I will have full access to your [place] medical record during this entire phone call.   I will be taking notes for your medical record. Since this is like an office visit, we will bill your insurance company for this service.  There are potential benefits and risks of telephone visits (e.g. limits to patient confidentiality) that differ from in-person visits.?  Confidentiality still applies for telephone services, and nobody will record the visit.  It is important to be in a quiet, private space that is free of distractions (including cell phone or other devices) during the visit.?? If during the course of the call I believe a telephone visit is not appropriate, you will not be charged for this service\"  Consent has been obtained for this service by care team member: Yes, per verbal agreement   Session Type: Patient is participating in a [facility]    Chief Complaint   Patient presents with     MH Treatment Plan     Managing symptoms of bipolar       New symptoms or complaints: None reported    Functional Impairment:   Personal: 2  Family: 2  Work: 2  Social:2          ASSESSMENT: Current Emotional / Mental Status (status of significant symptoms):              Risk status (Self / Other harm or suicidal ideation)              Patient denied personal safety concerns              Patient denies current or recent suicidal ideation or behaviors.              Patient denies current or recent homicidal ideation or behaviors.              Patient denies current or recent self " injurious behavior or ideation.              Patient denies other safety concerns.              Patient denied any additional risk factors              Patient denied any changes to her protective factors              Recommended that patient call 911 or go to the local ED should there be a change in any of these risk factors.                Attitude:                                   Cooperative               Orientation:                             To person, place, and time              Speech                          Rate / Production:       Talkative                          Volume:                       Normal               Mood:                                      Normal              Thought Content:                    Clear               Thought Form:                        Coherent  Logical               Insight:                                     Fair       Patient's impression of their current status: Patient shared that she feels as well her depression has lifted and that she is experiencing minimal manic symptoms.  She described her manic symptoms of having extra energy which at this point has allowed her to feel productive.  Patient shared what it has been like since starting her CD group as well as having a new .    Therapist impression of patients current state: Patient presents as more talkative than usual however she was also engaged throughout session.  Patient and therapist were able to check in regarding life dynamics, challenges she is facing, and develop treatment plan goals.  For the foreseeable future patient would like to focus on keeping her depressive symptomology managed as she reports winter months, especially January, can be difficult for her.    Type of psychotherapeutic technique provided: Client centered    Progress toward short term goals: Treatment plan goals were developed alongside patient during the session    Progress as expected as patient was engaged  throughout, Pt discussing concerns and coping strategies during tele-sessions. Pt working on homework assignments and skills learned in therapy between sessions    Review of long term goals:   Not done at today's visit  Treatment plan updated on November 19, 2020       Diagnosis:  1. Bipolar affective disorder in remission (H)    Improving as patient has noticed a reduction in depressive symptomology      Plan and Follow up: Patient will focus on establishing sharing a relationship with her new .  She will continue to take all medications as prescribed and will consult her doctor with any questions or concerns she may have.  Patient and therapist will meet again in 3 weeks.      Discharge Criteria/Planning: Patient will continue with follow-up until therapy can be discontinued without return of signs and symptoms.              I have reviewed the note as documented above.  This accurately captures the substance of my conversation with the patient.  As the provider I attest to compliance with applicable laws and regulations related to telemedicine.  Merly Yates, LICSW
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
## 2021-06-13 NOTE — PROGRESS NOTES
Telemedicine Visit: The patient's condition can be safely assessed and treated via synchronous audio and visual telemedicine encounter.      Reason for Telemedicine Visit: Services only offered telehealth    Originating Site (Patient Location): Patient's home    Distant Site (Provider Location): [facility]: [place]'s    Consent:  The patient/guardian has verbally consented to: the potential risks and benefits of telemedicine (video visit) versus in person care; bill my insurance or make self-payment for services provided; and responsibility for payment of non-covered services.     Mode of Communication:  Video Conference via Zoom    Call Started at: 10:00 AM  Call Ended at: 12:00 PM    As the provider I attest to compliance with applicable laws and regulations related to telemedicine.
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
## 2021-06-13 NOTE — TELEPHONE ENCOUNTER
Called and spoke to Pt who confirmed she was able to procure all of her medications and has resumed taking as prescribed. She also indicated she has a psychiatric appointment next Tuesday, 12/15.

## 2021-06-13 NOTE — PROGRESS NOTES
Jaylin Easley attended 3 hours of group today.     The group topic was Grief and Loss, patient was responsive to topic.     Patient's engagement in the group session: high     Total group size: 5    Pt reports continuing manic symptoms, rating amina 8/10 over the past week. She reportedly met with her prescriber twice and is in-process of adjusting medications. She was very jumpy and distracted during group.     Jignesh Clark [facility], [facility]  12/18/2020, 12:04 PM

## 2021-06-13 NOTE — PROGRESS NOTES
Jaylin Easley attended 3 hours of group on 12/07/2020.     The group topic was Mental Health, patient was responsive to topic.     Patient's engagement in the group session: medium     Total number of patients present 6.     Counselor(s) present: MELVI Green and MELVI Berman    Supervising MD: MELVI Boland and MELVI Berman  12/7/2020, 12:45 PM
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
## 2021-06-13 NOTE — PROGRESS NOTES
Jaylin Easley attended 3 hours of group on 12/09/2020.     The group topic was Mental Health, patient was responsive to topic.     Patient's engagement in the group session: medium     Total number of patients present 8.     Counselor(s) present: MELVI Green and MELVI Berman    Supervising MD: MELVI Boland and MELVI Berman  12/9/2020, 12:29 PM

## 2021-06-13 NOTE — PROGRESS NOTES
Latoya Patrick attended 2 hours of group on 12/21/2020.  Pt left after group break and did not come back.      The group topic was Strengh-Based Recovery, patient was responsive to topic.     Patient's engagement in the group session: high     Total number of patients present 5.     Counselor(s) present: MELVI Green and MELVI Berman    Supervising MD: MELVI Boland  12/21/2020, 1:18 PM

## 2021-06-13 NOTE — PROGRESS NOTES
Jaylin Easley attended 3 hours of group on 11/30/2020.     The group topic was Relationships, patient was responsive to topic.     Patient's engagement in the group session: medium     Total number of patients present 6.     Counselor(s) present: MELVI Green and MELVI Berman    Supervising MD: MELVI Boland and MELVI Berman  11/30/2020, 12:38 PM

## 2021-06-13 NOTE — PROGRESS NOTES
Telemedicine Visit: The patient's condition can be safely assessed and treated via synchronous audio and visual telemedicine encounter.      Reason for Telemedicine Visit: Services only offered telehealth    Originating Site (Patient Location): Patient's home    Distant Site (Provider Location): [facility]: [place]'s    Consent:  The patient/guardian has verbally consented to: the potential risks and benefits of telemedicine (video visit) versus in person care; bill my insurance or make self-payment for services provided; and responsibility for payment of non-covered services.     Mode of Communication:  Video Conference via Zoom    Call Started at: 09:00 AM  Call Ended at: 12:00 PM    As the provider I attest to compliance with applicable laws and regulations related to telemedicine.

## 2021-06-13 NOTE — PROGRESS NOTES
Jaylin Easley attended 2 hours of group on 11/18/2020.     The group topic was Sober Structure, patient was responsive to topic.     Patient's engagement in the group session: medium     Total number of patients present 8.     Counselor(s) present: MELVI Green and MELVI Berman    Supervising MD: MELVI Boland and MELVI Berman  11/18/2020, 12:40 PM

## 2021-06-13 NOTE — PROGRESS NOTES
Jaylin Easley attended 2 hours of group on 12/02/2020.     The group topic was Relationships, patient was responsive to topic.     Patient's engagement in the group session: medium     Total number of patients present 7.     Counselor(s) present: MELVI Green and MELVI Berman    Supervising MD: MELVI Boland and MELVI Berman  12/2/2020, 3:31 PM
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
## 2021-06-13 NOTE — PROGRESS NOTES
Jaylin Easley attended 3 hours of group on 11/25/2020.     The group topic was Acceptance, patient was responsive to topic.     Patient's engagement in the group session: medium     Total number of patients present 6.     Counselor(s) present: MELVI Green and MELVI Berman    Supervising MD: MELVI Boland and MELVI Berman  11/25/2020, 12:52 PM

## 2021-06-13 NOTE — PROGRESS NOTES
Telemedicine Visit: The patient's condition can be safely assessed and treated via synchronous audio and visual telemedicine encounter.      Reason for Telemedicine Visit: Services only offered telehealth    Originating Site (Patient Location): Patient's home    Distant Site (Provider Location): [facility]: [place]'s    Consent:  The patient/guardian has verbally consented to: the potential risks and benefits of telemedicine (video visit) versus in person care; bill my insurance or make self-payment for services provided; and responsibility for payment of non-covered services.     Mode of Communication:  Video Conference via Zoom    Call Started at: 10:00 AM  Call Ended at: 11:00 AM    As the provider I attest to compliance with applicable laws and regulations related to telemedicine.

## 2021-06-13 NOTE — PROGRESS NOTES
Jaylin Easley attended 1 hour of group today.     The group topic was Grief and Loss, patient was responsive to topic.     Patient's engagement in the group session: high     Total group size: 7    Pt was exhibiting manic symptoms and acknowledged being off meds for several days due to cost though indicated she was prepared to purchase them today. She was excused from group and directed to procure her medications immediately and to re-join group after. She did not re-join.       LINDA Cervantes, [facility][note 3]  12/11/2020, 12:49 PM

## 2021-06-13 NOTE — PROGRESS NOTES
Jaylin Easley attended 3 hours of group on 12/16/2020.     The group topic was Grief and Loss, patient was responsive to topic.     Patient's engagement in the group session: medium     Total number of patients present 7.     Counselor(s) present: MELVI Green and MELVI Berman    Supervising MD: MELVI Boland and MELVI Berman  12/16/2020, 1:27 PM
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
## 2021-06-13 NOTE — PROGRESS NOTES
Telemedicine Visit: The patient's condition can be safely assessed and treated via synchronous audio and visual telemedicine encounter.      Reason for Telemedicine Visit: Services only offered telehealth    Originating Site (Patient Location): Patient's home    Distant Site (Provider Location): Provider Remote Setting- Home Office    Consent:  The patient/guardian has verbally consented to: the potential risks and benefits of telemedicine (video visit) versus in person care; bill my insurance or make self-payment for services provided; and responsibility for payment of non-covered services.     Mode of Communication:  Video Conference via Zoom    Call Started at: 9:30 AM   Call Ended at: 10:30 AM    As the provider I attest to compliance with applicable laws and regulations related to telemedicine.

## 2021-06-13 NOTE — TELEPHONE ENCOUNTER
Patient called to confirmed the the report by [place] counselor [place] fully participate in group on 12/14/2020. Counselor had called patient 3 times earlier today but there was no answer. Counselor contacted the patient's MICD Commitment  at [facility] to discuss concern about not being able to reach her. Patient eventually called back and reported that [place], and her internet reception is not good. Patient reports that she will travel back to the [facility] today, to be able to participate in group on 12/16/2020.      MINNA Byrne
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
## 2021-06-13 NOTE — PROGRESS NOTES
"Mental Health Psychotherapy Telephone Note    Patient: Jaylin Easley    : 1970 MRN: [id number]    Completed a 2 point identification verification: patient verbalized full name and date of birth.     Date: 2020 start time: 2:30 PM   stop Time: 3:12 PM   session #5    The patient has been notified of the following:   \"We have found that certain health care needs can be provided without the need for a face to face visit.  This service lets us provide the care you need with a phone conversation.  I will have full access to your Protection medical record during this entire phone call.   I will be taking notes for your medical record. Since this is like an office visit, we will bill your insurance company for this service.  There are potential benefits and risks of telephone visits (e.g. limits to patient confidentiality) that differ from in-person visits.?  Confidentiality still applies for telephone services, and nobody will record the visit.  It is important to be in a quiet, private space that is free of distractions (including cell phone or other devices) during the visit.?? If during the course of the call I believe a telephone visit is not appropriate, you will not be charged for this service\"  Consent has been obtained for this service by care team member: Yes, per verbal agreement   Session Type: Patient is participating in a telemedicine phone visit through [facility]      Those present for this session: Therapist and patient      Chief Complaint   Patient presents with     MH Follow Up     Taking care of herself       New symptoms or complaints: None reported    Functional Impairment:   Personal: 2  Family: 2  Work: Patient currently not working  Social:2          ASSESSMENT: Current Emotional / Mental Status (status of significant symptoms):              Risk status (Self / Other harm or suicidal ideation)              Patient denied any personal safety concerns              Patient denies " current or recent suicidal ideation or behaviors.              Patient denies current or recent homicidal ideation or behaviors.              Patient denies current or recent self injurious behavior or ideation.              Patient denies other safety concerns.              Patient denied any changes to risk factors              Patient denied any changes to protective factors              Recommended that patient call 911 or go to the local ED should there be a change in any of these risk factors.                Attitude:                                   Cooperative               Orientation:                             To person, place, and time              Speech                          Rate / Production:       Hyperverbal                           Volume:                       Normal               Mood:                                      Elevated               Thought Content:                    Magical Ideations               Thought Form:                        Flight of Ideas               Insight:                                     Impaired      Patient's impression of their current status: Patient shared that she was in a very good mood.  She reported that she has been off of her meds for a few days but that she has continued to participate in her CD group.  Patient shared that she feels like she has been getting gift from God and that she is like up butterfly going through a transformation.    Therapist impression of patients current state: Patient presented as being engaged but also she appeared to be manic during session.  Patient reported that she has had limited sleep, that she has not been eating a lot, and that she has been without her meds for couple of days.  She reported that she has a prescription ready and agreed to pick it up later today.  Patient spoke quickly and had a flight of ideas and what she shared.  With redirection patient was able to go back to topics to focus on her health  and making that a priority.    Type of psychotherapeutic technique provided: Client centered    Progress toward short term goals: Patient continues to attend all CD group sessions however she is currently without medications which has allowed manic behaviors to rise to the surface    Progress as expected, Pt discussing concerns and coping strategies during tele-sessions. Pt working on homework assignments and skills learned in therapy between sessions    Review of long term goals:   Not done at today's visit  Treatment plan updated on November 19, 2020       Diagnosis:  1. Bipolar affective disorder in remission (H)    Slightly worse as patient is displaying manic behaviors      Plan and Follow up: Patient well  her medications today and will begin to take them as prescribed consulting her doctor with any questions or concerns that she may have.  She will focus on self-care starting with nutrition which she agreed to eat following the session.  She will also focus on getting her sleep schedule back on track.  Patient will take all medications as prescribed by her doctor.  Patient and therapist will meet again in 3 weeks.      Discharge Criteria/Planning: Patient will continue with follow-up until therapy can be discontinued without return of signs and symptoms.              I have reviewed the note as documented above.  This accurately captures the substance of my conversation with the patient.  As the provider I attest to compliance with applicable laws and regulations related to telemedicine.  Merly Yates, LICSW

## 2021-06-13 NOTE — PROGRESS NOTES
Telemedicine Visit: The patient's condition can be safely assessed and treated via synchronous audio and visual telemedicine encounter.      Reason for Telemedicine Visit: Services only offered telehealth    Originating Site (Patient Location): Patient's home    Distant Site (Provider Location): Provider Remote Setting- Home Office    Consent:  The patient/guardian has verbally consented to: the potential risks and benefits of telemedicine (video visit) versus in person care; bill my insurance or make self-payment for services provided; and responsibility for payment of non-covered services.     Mode of Communication:  Video Conference via Zoom    Call Started at: 9:00 AM  Call Ended at: 11:20 AM    As the provider I attest to compliance with applicable laws and regulations related to telemedicine.

## 2021-06-13 NOTE — PROGRESS NOTES
Telemedicine Visit: The patient's condition can be safely assessed and treated via synchronous audio and visual telemedicine encounter.      Reason for Telemedicine Visit: Services only offered telehealth    Originating Site (Patient Location): Patient's home    Distant Site (Provider Location): [facility]: [facility]    Consent:  The patient/guardian has verbally consented to: the potential risks and benefits of telemedicine (video visit) versus in person care; bill my insurance or make self-payment for services provided; and responsibility for payment of non-covered services.     Mode of Communication:  Video Conference via Zoom    Call Started at: 09:00 AM  Call Ended at: 11:32 AM    As the provider I attest to compliance with applicable laws and regulations related to telemedicine.

## 2021-06-13 NOTE — TELEPHONE ENCOUNTER
"Staff faxed patient's treatment verification to her  at [facility], per her request.  Patient sent the following email:   \"Kt Kim,  My PO needs proof that I'm in treatment when I started...  When I'm ending they say I'm supposed to have it complete by February 20th 2021... I think I'm in here till May?  You may fax that information to:  Misty Prasad  Probation/  Phone number is [phone]  Fax number is [phone]  Or by mail:  [address] [place] [address]   Thank you Julio I'll see you Monday\"      MELVI Byrne  "

## 2021-06-13 NOTE — PROGRESS NOTES
Jaylin Easley attended 3 hours of group on 11/16/2020.     The group topic was Sober Structure, patient was responsive to topic.     Patient's engagement in the group session: medium     Total number of patients present 8.     Counselor(s) present: MELVI Berman    Supervising MD: MELVI Aguirre  11/16/2020, 12:16 PM

## 2021-06-14 NOTE — TELEPHONE ENCOUNTER
Writer faxed copies of patient's discharge summary to [facility] to report that the patient has been discharged from [facility] MI/CD Treatment.      MELVI Byrne

## 2021-06-14 NOTE — TELEPHONE ENCOUNTER
Writer faxed copies of patient's current progress note to [facility], as requested by her MI/CD Commitment .        MELVI Byrne
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
## 2021-06-14 NOTE — TELEPHONE ENCOUNTER
"Writer received the following e-mail message from the patient's Mental Health  at [facility]: \"Reyes Kim, I just wanted to check on Jaylin Easley s participation lately. I was notified that she was brought to [facility] yesterday due to mental health symptoms and possible drug use. Has she been in group lately?\"  Writer e-mailed the following response: \"She was in group on Monday, 1/4/2021, and disclosed that she had used heroine on 12/28/2020. She explained the circumstances and received feedback from me and group members. I tried to contact her today because she did not show up for group, but I did not get any response. I did not know she was having such crisis.  It s good that you connected with me because I had no idea what was going on with her.   So, apart from missing group today, she has been regular to group; sometimes joins a little late, but never usually misses a whole group.  Please let me know if there is anything else I can do to help the situation.\"  This situation will continue to be monitored.    MELVI Byrne              "

## 2021-06-14 NOTE — PROGRESS NOTES
Telemedicine Visit: The patient's condition can be safely assessed and treated via synchronous audio and visual telemedicine encounter.      Reason for Telemedicine Visit: Services only offered telehealth    Originating Site (Patient Location): Patient's home    Distant Site (Provider Location): [facility]: [place]'s    Consent:  The patient/guardian has verbally consented to: the potential risks and benefits of telemedicine (video visit) versus in person care; bill my insurance or make self-payment for services provided; and responsibility for payment of non-covered services.     Mode of Communication:  Video Conference via Zoom    Call Started at: 09:30 AM  Call Ended at: 12:10 PM    As the provider I attest to compliance with applicable laws and regulations related to telemedicine.

## 2021-06-14 NOTE — PROGRESS NOTES
Jaylin Easley attended 3 hours of group on 12/23/2020.     The group topic was Strengths, patient was responsive to topic.     Patient's engagement in the group session: medium     Total number of patients present 4.     Counselor(s) present: MELVI Green, MELVI Byrne    Supervising MD: MELVI Boland and MELVI Berman  12/23/2020, 12:21 PM
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
## 2021-06-14 NOTE — PROGRESS NOTES
Jaylin Easley attended 1 hours of group on 12/28/2020.     The group topic was Strengths, patient was responsive to topic.     Patient's engagement in the group session: medium     Total number of patients present 4.     Counselor(s) present: MELVI Berman    Supervising MD: MELVI Aguirre  12/28/2020, 12:16 PM

## 2021-06-14 NOTE — PROGRESS NOTES
I have reviewed the notes, assessments, and/or procedures performed by the service provider, I concur with her/his documentation of Jaylin Easley.

## 2021-06-14 NOTE — PROGRESS NOTES
Telemedicine Visit: The patient's condition can be safely assessed and treated via synchronous audio and visual telemedicine encounter.      Reason for Telemedicine Visit: Services only offered telehealth    Originating Site (Patient Location): Patient's home    Distant Site (Provider Location): [facility]: [place]'s    Consent:  The patient/guardian has verbally consented to: the potential risks and benefits of telemedicine (video visit) versus in person care; bill my insurance or make self-payment for services provided; and responsibility for payment of non-covered services.     Mode of Communication:  Video Conference via Zoom    Call Started at: 09:30 AM  Call Ended at: 12:15 PM    As the provider I attest to compliance with applicable laws and regulations related to telemedicine.

## 2021-06-14 NOTE — TELEPHONE ENCOUNTER
"Patient initiated contact with counselor today, from her hospital room at [facility] to report that she felt very betrayed by her counselor for disclosing information about her illicit use to her [facility] . Patient is currently being held at [facility] for mental health crisis. She expressed anger that she is being ordered to remain held for 6 months and says that her counselor is responsible for her commitment because she was betrayed.  Patient requested for, and was given the telephone number to the [facility] treatment supervisor at [facility]; saying she wants to file a complaint against her counselor.  The patient insisted that she did not use heroine as reported by her, in group, on Monday, 01/04/2021. She stated that she was just trying to test the counselor because it has been said in group that whatever is shared in group remains confidential. Patient states, \"I feel very betrayed. You brook the code man. And because of you, I am going to remain here for 6 f...ing months. You don't understand what you have done to me.\"    Julio Inman, MELVI  "

## 2021-06-14 NOTE — TELEPHONE ENCOUNTER
Writer contacted the patient's MI/CD  at [facility] to report that copies of current progress notes have been faxed. The  reported that the patient is still on admission in the mental health unit at [facility], with a severe mental health crisis. Writer will continue to monitor the situation.      MELVI Byrne

## 2021-06-14 NOTE — PROGRESS NOTES
Jaylin Easley attended 3 hours of group on 12/30/2020.     The group topic was Stress Management, patient was responsive to topic.     Patient's engagement in the group session: medium     Total number of patients present 5.     Counselor(s) present: MELVI Green and MELVI Berman    Supervising MD: MELVI Boland and MELVI Berman  12/30/2020, 12:39 PM

## 2021-06-14 NOTE — PROGRESS NOTES
Telemedicine Visit: The patient's condition can be safely assessed and treated via synchronous audio and visual telemedicine encounter.      Reason for Telemedicine Visit: Services only offered telehealth    Originating Site (Patient Location): Patient's home    Distant Site (Provider Location): [facility]: [place]'s    Consent:  The patient/guardian has verbally consented to: the potential risks and benefits of telemedicine (video visit) versus in person care; bill my insurance or make self-payment for services provided; and responsibility for payment of non-covered services.     Mode of Communication:  Video Conference via Zoom    Call Started at: 09:30 AM  Call Ended at: 12:05 PM    As the provider I attest to compliance with applicable laws and regulations related to telemedicine.
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
## 2021-06-14 NOTE — PROGRESS NOTES
Telemedicine Visit: The patient's condition can be safely assessed and treated via synchronous audio and visual telemedicine encounter.      Reason for Telemedicine Visit: Services only offered telehealth    Originating Site (Patient Location): Patient's home    Distant Site (Provider Location): [facility]    Consent:  The patient/guardian has verbally consented to: the potential risks and benefits of telemedicine (video visit) versus in person care; bill my insurance or make self-payment for services provided; and responsibility for payment of non-covered services.     Mode of Communication:  Video Conference via Zoom    Call Started at: 09:30 AM  Call Ended at: 12:05 PM    As the provider I attest to compliance with applicable laws and regulations related to telemedicine.

## 2021-06-14 NOTE — PROGRESS NOTES
Patient had an appointment scheduled for today at 2:30 PM by phone.  This writer reached out to patient on 2 separate occasions without an answer.  This writer was unable to leave a voice message as her mailbox was full.  Patient does not have active my chart to send a message by that means.
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
## 2021-06-14 NOTE — PROGRESS NOTES
Patient had an appointment scheduled today at 2:30 PM by phone.  This writer reached out to patient on 2 separate occasions without answer.  A message was left reminding patient of her future appointment as well as the number to call and schedule or cancel an appointment should she need to.

## 2021-06-14 NOTE — PROGRESS NOTES
Jaylin Easley attended 3 hours of group on 01/04/2021.     The group topic was Wellness, patient was responsive to topic.     Patient's engagement in the group session: medium     Total number of patients present 5.     Counselor(s) present: MELVI Berman    Supervising MD: MELVI Aguirre  1/4/2021, 1:08 PM
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
## 2021-07-04 NOTE — LETTER
Letter by Merly Yates LICSW at      Author: Merly Yates LICSW Service: -- Author Type: --    Filed:  Encounter Date: 6/10/2021 Status: (Other)         Berta 10, 2021        Jaylin BONILLA Kaushal  [address] [place] [id number]             Dear Bobbi,      I hope this letter finds you well.  I have not heard from you since  December 9, 2020.      State [facility] and agency policies indicate the need to close files that have shown no activity for 3 months.  If you would like to reschedule please call [phone] to set up an appointment.  If we do not hear from you by July 1, 2021, we will close your file.  If you prefer not to reschedule at this time please know you can reopen your file at a later date or seek counseling with another mental health professional.  If you would like a referral or assistance in locating another mental health agency or professional please let us know.        Sincerely,    LINDA Kelly  __________________________________  Psychotherapist Printed Signature Sincerely,

## 2021-07-15 ENCOUNTER — APPOINTMENT (OUTPATIENT)
Dept: URBAN - METROPOLITAN AREA CLINIC 254 | Age: 51
Setting detail: DERMATOLOGY
End: 2021-07-18

## 2021-07-15 VITALS — HEIGHT: 59.5 IN | WEIGHT: 210 LBS

## 2021-07-15 DIAGNOSIS — D485 NEOPLASM OF UNCERTAIN BEHAVIOR OF SKIN: ICD-10-CM

## 2021-07-15 DIAGNOSIS — L259 CONTACT DERMATITIS AND OTHER ECZEMA, UNSPECIFIED CAUSE: ICD-10-CM

## 2021-07-15 DIAGNOSIS — I87.2 VENOUS INSUFFICIENCY (CHRONIC) (PERIPHERAL): ICD-10-CM

## 2021-07-15 PROBLEM — D48.5 NEOPLASM OF UNCERTAIN BEHAVIOR OF SKIN: Status: ACTIVE | Noted: 2021-07-15

## 2021-07-15 PROBLEM — L23.9 ALLERGIC CONTACT DERMATITIS, UNSPECIFIED CAUSE: Status: ACTIVE | Noted: 2021-07-15

## 2021-07-15 PROCEDURE — 11102 TANGNTL BX SKIN SINGLE LES: CPT

## 2021-07-15 PROCEDURE — OTHER MIPS QUALITY: OTHER

## 2021-07-15 PROCEDURE — OTHER PRESCRIPTION MEDICATION MANAGEMENT: OTHER

## 2021-07-15 PROCEDURE — OTHER MEDICATION COUNSELING: OTHER

## 2021-07-15 PROCEDURE — OTHER PRESCRIPTION: OTHER

## 2021-07-15 PROCEDURE — 99204 OFFICE O/P NEW MOD 45 MIN: CPT | Mod: 25

## 2021-07-15 PROCEDURE — OTHER BIOPSY BY SHAVE METHOD: OTHER

## 2021-07-15 PROCEDURE — 11103 TANGNTL BX SKIN EA SEP/ADDL: CPT

## 2021-07-15 PROCEDURE — OTHER COUNSELING: OTHER

## 2021-07-15 RX ORDER — TRIAMCINOLONE ACETONIDE 1 MG/G
0.1% CREAM TOPICAL BID
Qty: 1 | Refills: 2 | Status: ERX | COMMUNITY
Start: 2021-07-15

## 2021-07-15 ASSESSMENT — LOCATION ZONE DERM
LOCATION ZONE: NOSE
LOCATION ZONE: LEG
LOCATION ZONE: TRUNK
LOCATION ZONE: ARM

## 2021-07-15 ASSESSMENT — LOCATION SIMPLE DESCRIPTION DERM
LOCATION SIMPLE: CHEST
LOCATION SIMPLE: LEFT PRETIBIAL REGION
LOCATION SIMPLE: LEFT FOREARM
LOCATION SIMPLE: NOSE
LOCATION SIMPLE: RIGHT PRETIBIAL REGION
LOCATION SIMPLE: RIGHT FOREARM

## 2021-07-15 ASSESSMENT — SEVERITY ASSESSMENT: SEVERITY: MILD TO MODERATE

## 2021-07-15 ASSESSMENT — LOCATION DETAILED DESCRIPTION DERM
LOCATION DETAILED: RIGHT PROXIMAL DORSAL FOREARM
LOCATION DETAILED: NASAL TIP
LOCATION DETAILED: LEFT DISTAL PRETIBIAL REGION
LOCATION DETAILED: MIDDLE STERNUM
LOCATION DETAILED: LEFT PROXIMAL DORSAL FOREARM
LOCATION DETAILED: RIGHT DISTAL PRETIBIAL REGION

## 2021-07-15 ASSESSMENT — BSA RASH: BSA RASH: 3

## 2021-07-15 NOTE — PROCEDURE: BIOPSY BY SHAVE METHOD
Validate Lesion Size: No
Electrodesiccation And Curettage Text: The wound bed was treated with electrodesiccation and curettage after the biopsy was performed.
Curettage Text: The wound bed was treated with curettage after the biopsy was performed.
Biopsy Method: Dermablade
Wound Care: Petrolatum
Additional Anesthesia Volume In Cc (Will Not Render If 0): 0
Notification Instructions: Patient will be notified of biopsy results. However, patient instructed to call the office if not contacted within 2 weeks.
Billing Type: Third-Party Bill
Anesthesia Volume In Cc (Will Not Render If 0): 0.1
Consent: Written consent was obtained and risks were reviewed including but not limited to scarring, infection, bleeding, scabbing, incomplete removal, nerve damage and allergy to anesthesia.
Post-Care Instructions: I reviewed with the patient in detail post-care instructions. Patient is to keep the biopsy site dry overnight, and then apply bacitracin twice daily until healed. Patient may apply hydrogen peroxide soaks to remove any crusting.
Silver Nitrate Text: The wound bed was treated with silver nitrate after the biopsy was performed.
Electrodesiccation Text: The wound bed was treated with electrodesiccation after the biopsy was performed.
Dressing: bandage
Depth Of Biopsy: dermis
Detail Level: Detailed
Cryotherapy Text: The wound bed was treated with cryotherapy after the biopsy was performed.
Biopsy Type: H and E
Information: Selecting Yes will display possible errors in your note based on the variables you have selected. This validation is only offered as a suggestion for you. PLEASE NOTE THAT THE VALIDATION TEXT WILL BE REMOVED WHEN YOU FINALIZE YOUR NOTE. IF YOU WANT TO FAX A PRELIMINARY NOTE YOU WILL NEED TO TOGGLE THIS TO 'NO' IF YOU DO NOT WANT IT IN YOUR FAXED NOTE.
Hemostasis: Electrocautery
Was A Bandage Applied: Yes
Anesthesia Type: 2% lidocaine with epinephrine
Type Of Destruction Used: Curettage
Anesthesia Type: 1% lidocaine with epinephrine
Anesthesia Volume In Cc (Will Not Render If 0): 0.5

## 2021-07-15 NOTE — PROCEDURE: PRESCRIPTION MEDICATION MANAGEMENT
Plan: May apply TMC to affected areas on legs twice daily for no longer than 14 days per month. Recommend use of compression stockings; Rx faxed to [facility] in [place]. Patient referred to [facility] in [place]; fax sent. Plan: May apply TMC to affected areas on legs twice daily for no longer than 14 days per month. Recommend use of compression stockings; Rx faxed to [facility] in [place]. Patient referred to [facility] in [place]; fax sent.

## 2021-07-27 ENCOUNTER — APPOINTMENT (OUTPATIENT)
Dept: URBAN - METROPOLITAN AREA CLINIC 254 | Age: 51
Setting detail: DERMATOLOGY
End: 2021-07-27

## 2021-07-27 VITALS — WEIGHT: 206 LBS | HEIGHT: 69 IN

## 2021-07-27 DIAGNOSIS — L81.4 OTHER MELANIN HYPERPIGMENTATION: ICD-10-CM

## 2021-07-27 DIAGNOSIS — Z71.89 OTHER SPECIFIED COUNSELING: ICD-10-CM

## 2021-07-27 DIAGNOSIS — L82.1 OTHER SEBORRHEIC KERATOSIS: ICD-10-CM

## 2021-07-27 DIAGNOSIS — D18.0 HEMANGIOMA: ICD-10-CM

## 2021-07-27 DIAGNOSIS — L90.5 SCAR CONDITIONS AND FIBROSIS OF SKIN: ICD-10-CM

## 2021-07-27 PROBLEM — D18.01 HEMANGIOMA OF SKIN AND SUBCUTANEOUS TISSUE: Status: ACTIVE | Noted: 2021-07-27

## 2021-07-27 PROCEDURE — OTHER COUNSELING: OTHER

## 2021-07-27 PROCEDURE — OTHER MIPS QUALITY: OTHER

## 2021-07-27 PROCEDURE — 99213 OFFICE O/P EST LOW 20 MIN: CPT

## 2021-07-27 ASSESSMENT — LOCATION DETAILED DESCRIPTION DERM
LOCATION DETAILED: RIGHT MID-UPPER BACK
LOCATION DETAILED: RIGHT DISTAL PRETIBIAL REGION
LOCATION DETAILED: LEFT ANTERIOR DISTAL THIGH
LOCATION DETAILED: LEFT DISTAL PRETIBIAL REGION
LOCATION DETAILED: INFERIOR THORACIC SPINE
LOCATION DETAILED: RIGHT ANTERIOR DISTAL THIGH
LOCATION DETAILED: LEFT SUPERIOR FOREHEAD
LOCATION DETAILED: LEFT PROXIMAL PRETIBIAL REGION

## 2021-07-27 ASSESSMENT — LOCATION SIMPLE DESCRIPTION DERM
LOCATION SIMPLE: LEFT THIGH
LOCATION SIMPLE: LEFT PRETIBIAL REGION
LOCATION SIMPLE: RIGHT PRETIBIAL REGION
LOCATION SIMPLE: RIGHT UPPER BACK
LOCATION SIMPLE: UPPER BACK
LOCATION SIMPLE: RIGHT THIGH
LOCATION SIMPLE: LEFT FOREHEAD
LOCATION SIMPLE: LEFT PRETIBIAL REGION

## 2021-07-27 ASSESSMENT — LOCATION ZONE DERM
LOCATION ZONE: LEG
LOCATION ZONE: LEG
LOCATION ZONE: FACE
LOCATION ZONE: TRUNK

## 2021-07-27 NOTE — PROCEDURE: COUNSELING
Detail Level: Detailed
Detail Level: Simple
Patient Specific Counseling (Will Not Stick From Patient To Patient): Patient was advised to keep an eye on scars and call if any changes.
Detail Level: Zone

## 2021-08-23 ENCOUNTER — DOCUMENTATION ONLY (OUTPATIENT)
Dept: BEHAVIORAL HEALTH | Facility: CLINIC | Age: 51
End: 2021-08-23

## 2021-08-23 NOTE — PROGRESS NOTES
Discharge Summary  Multiple Sessions    Client Name: Jaylin Easley MRN#: [id number] YOB: 1970      Intake / Discharge Date: Initial appointment-October 19, 2020      Last appointment-December 9, 2020      DSM5 Diagnoses: (Sustained by DSM5 Criteria Listed Above)  Diagnoses: 296.42 Bipolar I Disorder Current or Most Recent Episode Manic, Moderate   Psychosocial & Contextual Factors:  female who struggled with home and family stability  WHODAS 2.0 (12 item) Score: 18          Presenting Concern:  Patient presented for services following a manic episode in the summer 2020 that led to her being hospitalized in July of that year for 3 weeks and also receiving a 6-month commitment at that time.  Patient shared that she had struggled with bipolar since her early 20s and had attempted to manage her symptoms through therapy as well as medication.  Patient was also involved with addiction medicine while receiving therapeutic supports.      Reason for Discharge:  Client did not return      Disposition at Time of Last Encounter:   Comments:   During her time in therapy patient and therapist worked on developing coping skills and strategies to manage both manic and depressive symptoms.  During patient's last appointment she noted that she had been out of her medication and had not picked up her refills and was not certain when she would be able to do so.  Patient missed her following 2 appointments after this and did not reschedule.     Risk Management:   Client has had a history of suicidal ideation: Patient shared that her suicidal ideation was mostly passive having thoughts of being tired of being tired.  In her past her thoughts were a bit more specific such as I should just kill myself. and suicide attempts: Patient reported having a suicide attempt in the past but was not specific on the details  Recommended that patient call 911 or go to the local ED should there be a change  in any of these risk factors.      Referred To:  Patient should continue to work with her doctors to manage symptoms related to her bipolar as well as work with addiction medicine to remain chemically free.  Patient could resume individual therapeutic supports at any time with this provider or a provider of her choosing.        Merly Yates, [facility]   8/23/2021

## 2024-03-22 ENCOUNTER — HOSPITAL ENCOUNTER (EMERGENCY)
Facility: CLINIC | Age: 54
Discharge: HOME | End: 2024-03-22
Attending: EMERGENCY MEDICINE | Admitting: EMERGENCY MEDICINE

## 2024-03-22 VITALS
RESPIRATION RATE: 16 BRPM | HEART RATE: 95 BPM | HEIGHT: 69 IN | DIASTOLIC BLOOD PRESSURE: 79 MMHG | WEIGHT: 150 LBS | OXYGEN SATURATION: 99 % | SYSTOLIC BLOOD PRESSURE: 158 MMHG | TEMPERATURE: 98.4 F | BODY MASS INDEX: 22.22 KG/M2

## 2024-03-22 DIAGNOSIS — F41.9 ANXIETY: ICD-10-CM

## 2024-03-22 PROCEDURE — 99282 EMERGENCY DEPT VISIT SF MDM: CPT

## 2024-03-22 ASSESSMENT — COLUMBIA-SUICIDE SEVERITY RATING SCALE - C-SSRS
6. HAVE YOU EVER DONE ANYTHING, STARTED TO DO ANYTHING, OR PREPARED TO DO ANYTHING TO END YOUR LIFE?: NO
2. HAVE YOU ACTUALLY HAD ANY THOUGHTS OF KILLING YOURSELF IN THE PAST MONTH?: NO
1. IN THE PAST MONTH, HAVE YOU WISHED YOU WERE DEAD OR WISHED YOU COULD GO TO SLEEP AND NOT WAKE UP?: NO

## 2024-03-22 ASSESSMENT — ACTIVITIES OF DAILY LIVING (ADL)
ADLS_ACUITY_SCORE: 35
ADLS_ACUITY_SCORE: 35

## 2024-03-22 NOTE — ED NOTES
[facility]  ED to EMPATH Checklist:      Goal for EMPATH: Depression management and Anxiety management    Current Behavior: Calm and Cooperative    Safety Concerns: None    Legal Hold Status: Voluntary    Medically Cleared by ED provider: Yes    Patient Therapeutically Searched: Not searched - Currently in triage    Belongings: Remain with patient    Independent Ambulation at Baseline: Yes/No: Yes    Participates in Care/Conversation: Yes/No: Yes    Patient Informed about EMPATH: Yes/No: Yes    DEC: Not ordered    Patient Ready to be Transferred to EMPATH? Yes/No: Yes

## 2024-03-22 NOTE — ED PROVIDER NOTES
"  History     Chief Complaint:  Mental Health Problem       HPI   Jaylin Easley is a 54 year old female who presents with concerns asking for medication refill.  She states that she stopped taking her medications, which are mostly psychiatric medications, and now she would like to restart them.  She states that she sometimes \"go still on the land\" when she is off of her meds and would like to restart and for this reason.  Denies any desire to hurt herself or hurt other people, states that she has no medical complaints at this time.      Independent Historian:   No    Review of External Notes: Yes I reviewed the patient's last ER note from an outside hospital on  where the patient was seen for methamphetamine abuse.      Allergies:  Sulfa Antibiotics     Medications:    albuterol (PROAIR HFA, PROVENTIL HFA, VENTOLIN HFA) 108 (90 BASE) MCG/ACT inhaler  ARIPiprazole (ABILIFY) 10 MG tablet  clonazePAM (KLONOPIN) 1 MG tablet  Cyanocobalamin 1000 MCG/ML LIQD  multivitamin w/minerals (MULTI-VITAMIN) tablet  Oxycodone-Acetaminophen (PERCOCET PO)  tiotropium (SPIRIVA HANDIHALER) 18 MCG inhaled capsule        Past Medical History:    Past Medical History:   Diagnosis Date    Anxiety     Bipolar 1 disorder (H)     COPD (chronic obstructive pulmonary disease) (H)     COPD (chronic obstructive pulmonary disease) (H)     Psychotic disorder (H)        Past Surgical History:    Past Surgical History:   Procedure Laterality Date    ARTHROSCOPY KNEE  10/27/2011    Procedure:ARTHROSCOPY KNEE; Left knee arthroscopy with partial medial and lateral meniscectomies; Surgeon:SUREKHA WALDEN; Location:[facility] OR    CERVIX SURGERY      DILATION AND CURETTAGE SUCTION, TREAT INCOMPLETE       x3    ENT SURGERY      tonsilectomy.     TONSILLECTOMY          Family History:    family history includes Respiratory in her brother and father.    Social History:   reports that she has been smoking cigarettes. She has " "been smoking an average of 1 pack per day. She has never used smokeless tobacco. She reports current alcohol use. She reports that she does not use drugs.  PCP: Stanford Gonzalez     Physical Exam   Patient Vitals for the past 24 hrs:   BP Temp Temp src Pulse Resp SpO2 Height Weight   03/22/24 1756 (!) 158/79 98.4  F (36.9  C) Oral 95 16 99 % 1.753 m (5' 9\") 68 kg (150 lb)        Physical Exam  Vitals: reviewed by me  General: Pt seen on [facility], pleasant, cooperative, and alert to conversation  Eyes: Tracking well, clear conjunctiva BL  ENT: MMM, midline trachea.   Lungs: No tachypnea, no accessory muscle use. No respiratory distress.   CV: Rate as above  MSK: no joint effusion.  No evidence of trauma  Skin: No rash  Neuro: Clear speech and no facial droop.  Psych: Not RIS, no e/o AH/VH.  Denies any SI or HI, mostly linear thought pattern.  Pleasant overall and redirectable.      Emergency Department Course       Emergency Department Course & Assessments:        Interventions:  Medications - No data to display       Consultations/Discussion of Management or Tests:  Yes I have requested a formal mental health consultation with our DEC            Social Determinants of Health affecting care:   Stress/Adjustment Disorders      Disposition:  The patient was transferred to [facility].     Impression & Plan        Medical Decision Making:  This is a very pleasant 54-year-old female who presents to the emergency room asking for medication refill and help with some of her psychiatric symptoms like anxiety.  I does not like she has a history of methamphetamine use, though at this time she does not appear to be actively intoxicated, has a linear thought process and certainly seems to be goal oriented and have appropriate expectations here.  She would like to go see a provider in [facility] and restart on her medications.  Denies any medical complaint at this time.  Gross physical exam is unremarkable, labs " reassuring, medically cleared for transfer to [facility] at this time as I do think this is the next step in her management.    Diagnosis:    ICD-10-CM    1. Anxiety  F41.9                   3/22/2024   Shekhar Malone*        Shekhar Malone MD  03/22/24 1832

## 2024-03-22 NOTE — ED TRIAGE NOTES
Patient presents to the ED with requests for assistance with her medications. Patient denied SI and HI, she is calm and cooperative.      Triage Assessment (Adult)       Row Name 03/22/24 4993          Triage Assessment    Airway WDL WDL        Respiratory WDL    Respiratory WDL WDL        Skin Circulation/Temperature WDL    Skin Circulation/Temperature WDL WDL        Cardiac WDL    Cardiac WDL WDL        Peripheral/Neurovascular WDL    Peripheral Neurovascular WDL WDL        Cognitive/Neuro/Behavioral WDL    Cognitive/Neuro/Behavioral WDL WDL

## 2024-03-23 NOTE — ED NOTES
[facility] staff went to triage to bring pt to [facility]. Pt was no where to be found in triage. Staff checked throughout the area and bathroom. Triage staff notified.

## 2024-10-21 ENCOUNTER — HOSPITAL ENCOUNTER (EMERGENCY)
Facility: CLINIC | Age: 54
Discharge: HOME | End: 2024-10-22
Attending: EMERGENCY MEDICINE | Admitting: EMERGENCY MEDICINE
Payer: MEDICARE

## 2024-10-21 DIAGNOSIS — M25.511 CHRONIC RIGHT SHOULDER PAIN: ICD-10-CM

## 2024-10-21 DIAGNOSIS — R20.2 PARESTHESIA: ICD-10-CM

## 2024-10-21 DIAGNOSIS — G89.29 CHRONIC RIGHT SHOULDER PAIN: ICD-10-CM

## 2024-10-21 PROCEDURE — 99283 EMERGENCY DEPT VISIT LOW MDM: CPT

## 2024-10-21 ASSESSMENT — COLUMBIA-SUICIDE SEVERITY RATING SCALE - C-SSRS
1. IN THE PAST MONTH, HAVE YOU WISHED YOU WERE DEAD OR WISHED YOU COULD GO TO SLEEP AND NOT WAKE UP?: NO
6. HAVE YOU EVER DONE ANYTHING, STARTED TO DO ANYTHING, OR PREPARED TO DO ANYTHING TO END YOUR LIFE?: NO
2. HAVE YOU ACTUALLY HAD ANY THOUGHTS OF KILLING YOURSELF IN THE PAST MONTH?: NO

## 2024-10-22 ENCOUNTER — APPOINTMENT (OUTPATIENT)
Dept: GENERAL RADIOLOGY | Facility: CLINIC | Age: 54
End: 2024-10-22
Attending: EMERGENCY MEDICINE
Payer: MEDICARE

## 2024-10-22 VITALS
HEART RATE: 78 BPM | RESPIRATION RATE: 14 BRPM | OXYGEN SATURATION: 99 % | TEMPERATURE: 98.1 F | SYSTOLIC BLOOD PRESSURE: 125 MMHG | DIASTOLIC BLOOD PRESSURE: 112 MMHG

## 2024-10-22 PROCEDURE — 73030 X-RAY EXAM OF SHOULDER: CPT | Mod: RT

## 2024-10-22 RX ORDER — METHYLPREDNISOLONE 4 MG/1
TABLET ORAL
Qty: 21 TABLET | Refills: 0 | Status: SHIPPED | OUTPATIENT
Start: 2024-10-22

## 2024-10-22 ASSESSMENT — ACTIVITIES OF DAILY LIVING (ADL)
ADLS_ACUITY_SCORE: 35

## 2024-10-22 NOTE — ED TRIAGE NOTES
Reports injuring right shoulder 6 months ago while scrubbing the floor - c/o intermittent pain since - worse since yesterday with spasms and tingling down to fingers.     Triage Assessment (Adult)       Row Name 10/21/24 8610          Respiratory WDL    Respiratory WDL WDL        Cardiac WDL    Cardiac WDL WDL        Cognitive/Neuro/Behavioral WDL    Cognitive/Neuro/Behavioral WDL WDL

## 2024-10-22 NOTE — ED PROVIDER NOTES
Emergency Department Note      History of Present Illness     Chief Complaint   Shoulder Pain      HPI   Jaylin Easley is a 54 year old female who presents to the ED for right shoulder pain. 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 She explains that her pain is located within the right scapula and she experiences tingling and spasms through her right arm into her hand. Episodes of her pain and tingling occur intermittently. The patient adds that the pain takes her breath away and it feels as if her scapula is sticking out when she moves her arm. She takes Tylenol and ibuprofen when the pain gets very bad. She hasn't seen her primary for her symptoms because she has found it difficult to get in for an appointment. The patient reports to the ED today because her shoulder pain isn't improving. Denies alcohol use or taking prescription medication.     Independent Historian   None    Review of External Notes   [facility] ED note 4/18/24 discussing pain to the right shoulder.    Past Medical History     Medical History and Problem List   Bipolar I disorder   COPD  Schizoaffective disorder  Arthritis   Cocaine abuse  Depression  Alcohol abuse  Vitamin D deficiency   Methamphetamine abuse  Tobacco abuse  PTSD  Psoriasis   Heroin abuse  Cancer     Medications   Imodium   Clonazepam   Flexeril   Medrol dosepak  Albuterol   Abilify   Tiotropium   Percocet  Cyanocobalamin     Surgical History   Tonsillectomy   Knee arthroscopy   LEEP procedure   Adenoidectomy   Dilation and curettage    Physical Exam     Patient Vitals for the past 24 hrs:   BP Resp SpO2   10/22/24 0418 (!) 125/112 14 99 %     Physical Exam  General: Appears well-developed and well-nourished.   Head: No signs of trauma.   Neck: Normal range of motion. No nuchal rigidity. No cervical adenopathy  CV: Normal rate and regular rhythm.    Resp: Effort normal and breath sounds normal.  No respiratory distress.   MSK: No specific pain with palpation to the right shoulder.  No swelling, erythema, ecchymosis, or warmth.  Normal range of motion. Distal pulses intact.  Normal strength, cap refill, and sensation distally.   Skin: Skin is warm and dry. No rash noted.       Diagnostics     Lab Results   Labs Ordered and Resulted from Time of ED Arrival to Time of ED Departure - No data to display    Imaging   XR Shoulder Right G/E 3 Views   Final Result   IMPRESSION: No radiographic evidence of acute fracture or malalignment. Mild to moderate osteoarthritis of acromioclavicular and glenohumeral joints.          Independent Interpretation   On my independent interpretation of shoulder XR without displaced fracture  ED Course      Medications Administered   Medications - No data to display    Procedures   Procedures     Discussion of Management   None    ED Course   ED Course as of 10/23/24 0114   Tue Oct 22, 2024   0403 I obtained history and performed physical exam as noted above.          Medical Decision Making / Diagnosis       MARIOLA   Jaylin Easley is a 54 year old female presents with right shoulder pain and occasionally a pain shooting down her arm.  She states that she has had this over the last many months and believes that it initially became aggravated when she was hand cleaning a floor.  She states that the pain will wax and wane.  On my evaluation there is no clinical signs of trauma and an x-ray had been obtained that did not show any signs of acute injury.  There is no clinical signs for infection.  I considered blood work but did not feel that it was indicated.  Her symptoms are consistent with a shoulder strain with peripheral nerve irritation.  I discussed supportive care measures with the patient and did offer a prescription for steroid taper to see if this helps.  I did stress the importance of following up with her primary care doctor along with orthopedics for further evaluation  and management of her chronic symptoms.    Disposition   The patient was discharged.     Diagnosis     ICD-10-CM    1. Chronic right shoulder pain  M25.511     G89.29       2. Paresthesia  R20.2            Discharge Medications   Discharge Medication List as of 10/22/2024  4:13 AM        START taking these medications    Details   methylPREDNISolone (MEDROL DOSEPAK) 4 MG tablet therapy pack Follow Package Directions, Disp-21 tablet, R-0, E-Prescribe               Scribe Disclosure:  Omid [place], am serving as a scribe at 4:35 AM on 10/22/2024 to document services personally performed by Ricardo Corbin MD based on my observations and the provider's statements to me.        Ricardo Corbin MD  10/23/24 0117

## 2024-10-22 NOTE — DISCHARGE INSTRUCTIONS
Please try using Tylenol and ibuprofen to help with your discomfort.  Please follow-up with the orthopedic doctors for further evaluation and management of your chronic symptoms.

## 2025-03-21 NOTE — PROCEDURE: MEDICATION COUNSELING
81.6 Minocycline Counseling: Patient advised regarding possible photosensitivity and discoloration of the teeth, skin, lips, tongue and gums.  Patient instructed to avoid sunlight, if possible.  When exposed to sunlight, patients should wear protective clothing, sunglasses, and sunscreen.  The patient was instructed to call the office immediately if the following severe adverse effects occur:  hearing changes, easy bruising/bleeding, severe headache, or vision changes.  The patient verbalized understanding of the proper use and possible adverse effects of minocycline.  All of the patient's questions and concerns were addressed.

## 2025-03-26 NOTE — PROGRESS NOTES
Jaylin Easley attended 1 hours of group today.     The group topic was Recovery Support, patient was responsive to topic.     Patient's engagement in the group session: medium     Total group size: 7      LINDA Cervantes, [facility]  11/20/2020, 1:03 PM  
Telemedicine Visit: The patient's condition can be safely assessed and treated via synchronous audio and visual telemedicine encounter.      Reason for Telemedicine Visit: Services only offered telehealth    Originating Site (Patient Location): Patient's home    Distant Site (Provider Location): Provider Remote Setting- Home Office    Consent:  The patient/guardian has verbally consented to: the potential risks and benefits of telemedicine (video visit) versus in person care; bill my insurance or make self-payment for services provided; and responsibility for payment of non-covered services.     Mode of Communication:  Video Conference via Zoom    Call Started at: 10:45 AM  Call Ended at: 11:45 AM    As the provider I attest to compliance with applicable laws and regulations related to telemedicine.  
negative